# Patient Record
Sex: MALE | Race: WHITE | NOT HISPANIC OR LATINO | URBAN - METROPOLITAN AREA
[De-identification: names, ages, dates, MRNs, and addresses within clinical notes are randomized per-mention and may not be internally consistent; named-entity substitution may affect disease eponyms.]

---

## 2017-10-11 ENCOUNTER — OFFICE (OUTPATIENT)
Dept: URBAN - METROPOLITAN AREA CLINIC 75 | Facility: CLINIC | Age: 72
End: 2017-10-11

## 2017-10-11 VITALS
WEIGHT: 218 LBS | SYSTOLIC BLOOD PRESSURE: 125 MMHG | HEIGHT: 70 IN | DIASTOLIC BLOOD PRESSURE: 80 MMHG | HEART RATE: 103 BPM

## 2017-10-11 DIAGNOSIS — R94.5 ABNORMAL RESULTS OF LIVER FUNCTION STUDIES: ICD-10-CM

## 2017-10-11 DIAGNOSIS — R93.8 ABNORMAL FINDINGS ON DIAGNOSTIC IMAGING OF OTHER SPECIFIED B: ICD-10-CM

## 2017-10-11 DIAGNOSIS — K59.1 FUNCTIONAL DIARRHEA: ICD-10-CM

## 2017-10-11 DIAGNOSIS — Z86.010 PERSONAL HISTORY OF COLONIC POLYPS: ICD-10-CM

## 2017-10-11 PROCEDURE — 99215 OFFICE O/P EST HI 40 MIN: CPT | Performed by: INTERNAL MEDICINE

## 2017-10-17 ENCOUNTER — OFFICE (OUTPATIENT)
Dept: URBAN - METROPOLITAN AREA LAB 2 | Facility: LAB | Age: 72
End: 2017-10-17

## 2017-10-17 DIAGNOSIS — K59.1 FUNCTIONAL DIARRHEA: ICD-10-CM

## 2017-10-17 DIAGNOSIS — A08.11 ACUTE GASTROENTEROPATHY DUE TO NORWALK AGENT: ICD-10-CM

## 2017-10-17 PROCEDURE — 87999 UNLISTED MICROBIOLOGY PX: CPT | Performed by: INTERNAL MEDICINE

## 2017-11-10 ENCOUNTER — INPATIENT HOSPITAL (OUTPATIENT)
Dept: URBAN - METROPOLITAN AREA HOSPITAL 107 | Facility: HOSPITAL | Age: 72
End: 2017-11-10

## 2017-11-10 DIAGNOSIS — K76.0 FATTY (CHANGE OF) LIVER, NOT ELSEWHERE CLASSIFIED: ICD-10-CM

## 2017-11-10 DIAGNOSIS — R94.5 ABNORMAL RESULTS OF LIVER FUNCTION STUDIES: ICD-10-CM

## 2017-11-10 DIAGNOSIS — K83.1 OBSTRUCTION OF BILE DUCT: ICD-10-CM

## 2017-11-10 DIAGNOSIS — R93.3 ABNORMAL FINDINGS ON DIAGNOSTIC IMAGING OF OTHER PARTS OF DI: ICD-10-CM

## 2017-11-10 PROCEDURE — 43242 EGD US FINE NEEDLE BX/ASPIR: CPT

## 2017-12-28 ENCOUNTER — OFFICE (OUTPATIENT)
Dept: URBAN - METROPOLITAN AREA CLINIC 75 | Facility: CLINIC | Age: 72
End: 2017-12-28

## 2017-12-28 VITALS
HEART RATE: 96 BPM | DIASTOLIC BLOOD PRESSURE: 76 MMHG | WEIGHT: 210 LBS | SYSTOLIC BLOOD PRESSURE: 120 MMHG | HEIGHT: 70 IN

## 2017-12-28 DIAGNOSIS — R93.8 ABNORMAL FINDINGS ON DIAGNOSTIC IMAGING OF OTHER SPECIFIED B: ICD-10-CM

## 2017-12-28 DIAGNOSIS — K59.1 FUNCTIONAL DIARRHEA: ICD-10-CM

## 2017-12-28 DIAGNOSIS — R94.5 ABNORMAL RESULTS OF LIVER FUNCTION STUDIES: ICD-10-CM

## 2017-12-28 DIAGNOSIS — R68.81 EARLY SATIETY: ICD-10-CM

## 2017-12-28 DIAGNOSIS — Z86.010 PERSONAL HISTORY OF COLONIC POLYPS: ICD-10-CM

## 2017-12-28 PROCEDURE — 99214 OFFICE O/P EST MOD 30 MIN: CPT | Performed by: INTERNAL MEDICINE

## 2018-04-02 ENCOUNTER — OFFICE VISIT (OUTPATIENT)
Dept: PAIN MEDICINE | Facility: CLINIC | Age: 73
End: 2018-04-02

## 2018-04-02 VITALS
DIASTOLIC BLOOD PRESSURE: 86 MMHG | OXYGEN SATURATION: 97 % | SYSTOLIC BLOOD PRESSURE: 142 MMHG | TEMPERATURE: 97.6 F | RESPIRATION RATE: 16 BRPM | HEIGHT: 71 IN | BODY MASS INDEX: 28.56 KG/M2 | WEIGHT: 204 LBS | HEART RATE: 89 BPM

## 2018-04-02 DIAGNOSIS — G89.29 OTHER CHRONIC PAIN: Primary | ICD-10-CM

## 2018-04-02 DIAGNOSIS — M48.061 SPINAL STENOSIS OF LUMBAR REGION, UNSPECIFIED WHETHER NEUROGENIC CLAUDICATION PRESENT: ICD-10-CM

## 2018-04-02 DIAGNOSIS — Z01.818 PREOP TESTING: ICD-10-CM

## 2018-04-02 DIAGNOSIS — M54.16 LUMBAR RADICULOPATHY: ICD-10-CM

## 2018-04-02 PROCEDURE — 99203 OFFICE O/P NEW LOW 30 MIN: CPT | Performed by: ANESTHESIOLOGY

## 2018-04-02 RX ORDER — TAMSULOSIN HYDROCHLORIDE 0.4 MG/1
0.4 CAPSULE ORAL DAILY
COMMUNITY

## 2018-04-02 RX ORDER — MYCOPHENOLATE MOFETIL 250 MG/1
750 CAPSULE ORAL EVERY 12 HOURS SCHEDULED
COMMUNITY
End: 2021-06-22

## 2018-04-02 RX ORDER — VENLAFAXINE HYDROCHLORIDE 150 MG/1
CAPSULE, EXTENDED RELEASE ORAL
COMMUNITY
Start: 2018-03-05 | End: 2018-04-17

## 2018-04-02 RX ORDER — CHLORTHALIDONE 25 MG/1
25 TABLET ORAL EVERY MORNING
COMMUNITY
Start: 2017-06-21

## 2018-04-02 RX ORDER — LOSARTAN POTASSIUM 100 MG/1
100 TABLET ORAL EVERY EVENING
COMMUNITY
End: 2021-02-01

## 2018-04-02 RX ORDER — ASPIRIN 81 MG/1
81 TABLET ORAL
COMMUNITY

## 2018-04-02 RX ORDER — CYANOCOBALAMIN 1000 UG/ML
1000 INJECTION, SOLUTION INTRAMUSCULAR; SUBCUTANEOUS
COMMUNITY

## 2018-04-02 RX ORDER — BUSPIRONE HYDROCHLORIDE 10 MG/1
10 TABLET ORAL 3 TIMES DAILY
COMMUNITY
End: 2021-06-22

## 2018-04-02 RX ORDER — BACLOFEN 10 MG/1
10 TABLET ORAL 3 TIMES DAILY
COMMUNITY
Start: 2017-07-25 | End: 2023-02-20

## 2018-04-02 RX ORDER — TRAMADOL HYDROCHLORIDE 50 MG/1
50 TABLET ORAL EVERY 6 HOURS PRN
COMMUNITY
Start: 2018-02-13 | End: 2018-06-06

## 2018-04-02 RX ORDER — MELOXICAM 15 MG/1
15 TABLET ORAL
COMMUNITY
End: 2021-02-01

## 2018-04-02 RX ORDER — MEMANTINE HYDROCHLORIDE 28 MG/1
CAPSULE, EXTENDED RELEASE ORAL
COMMUNITY
Start: 2017-11-21 | End: 2018-04-17

## 2018-04-02 RX ORDER — LACTULOSE 10 G/15ML
10 SOLUTION ORAL DAILY PRN
COMMUNITY
Start: 2017-06-23 | End: 2021-02-01

## 2018-04-02 RX ORDER — OMEPRAZOLE 40 MG/1
40 CAPSULE, DELAYED RELEASE ORAL EVERY MORNING
COMMUNITY

## 2018-04-02 RX ORDER — AMLODIPINE BESYLATE 5 MG/1
10 TABLET ORAL 2 TIMES DAILY
COMMUNITY
End: 2021-06-22

## 2018-04-02 RX ORDER — TACROLIMUS 1 MG/1
1 CAPSULE ORAL 2 TIMES DAILY
COMMUNITY
End: 2022-03-23

## 2018-04-02 RX ORDER — SUB-Q INSULIN DEVICE, 40 UNIT
1 EACH MISCELLANEOUS
COMMUNITY
Start: 2013-12-30

## 2018-04-02 RX ORDER — AMOXICILLIN 250 MG
1 CAPSULE ORAL
COMMUNITY
End: 2018-04-17

## 2018-04-02 RX ORDER — MULTIVITAMIN
1 TABLET ORAL DAILY
COMMUNITY

## 2018-04-02 RX ORDER — MORPHINE SULFATE 60 MG/1
30 TABLET, FILM COATED, EXTENDED RELEASE ORAL EVERY 12 HOURS SCHEDULED
COMMUNITY
Start: 2017-08-25 | End: 2021-10-27

## 2018-04-02 RX ORDER — SPIRONOLACTONE 25 MG/1
25 TABLET ORAL EVERY MORNING
COMMUNITY
End: 2021-06-22

## 2018-04-02 RX ORDER — RIVASTIGMINE TARTRATE 6 MG/1
CAPSULE ORAL
COMMUNITY
Start: 2017-12-11 | End: 2018-04-17

## 2018-04-02 RX ORDER — OXYCODONE HYDROCHLORIDE AND ACETAMINOPHEN 5; 325 MG/1; MG/1
1 TABLET ORAL EVERY 6 HOURS PRN
COMMUNITY
End: 2018-06-06

## 2018-04-02 RX ORDER — QUETIAPINE FUMARATE 25 MG/1
25 TABLET, FILM COATED ORAL NIGHTLY
COMMUNITY
End: 2018-04-17

## 2018-04-02 NOTE — PROGRESS NOTES
"CHIEF COMPLAINT  Pt is here for a SCS consult for LBP (no back surgery,no known accident))referred here per Dr Zepeda.  Pt has had significant LBP, becoming \"almost debilitating\" over the last 2 years. Pt has had numerous Lumbar injections,2 Lumbar RFs,with minimal relief.  Hx of PT: minimal relief.  Pt also has daily bilateral posterior upper leg pain (no numbness or tingling) for about 2 years.    Tien Cotto Sr. is a 73 y.o. male.   He presents to the office for evaluation of SCS Phase TWO. He was referred here by Dr. Zepeda    He recently completed a quite successful SCS trial, stating that he had much more than 50% pain relief during the trial and he is excited to proceed to implant.      His medical history as below is complex but he states that he heals well and he does not take any blood thinners.  Continues to follow with the [facility]plant team regularly (annually).      LBP is across the lower lumbar area bilaterally as a stabbing pain that radiates down to knees posteriorly bilaterally.  He notes that he had 50-70% relief at all times during the SCS trial.  Pain is constant and severe and affects mood and sleep.      History of Present Illness         Current Outpatient Prescriptions:   •  amLODIPine (NORVASC) 5 MG tablet, Take 5 mg by mouth., Disp: , Rfl:   •  aspirin 81 MG EC tablet, Take 81 mg by mouth., Disp: , Rfl:   •  baclofen (LIORESAL) 10 MG tablet, 10 mg., Disp: , Rfl:   •  busPIRone (BUSPAR) 10 MG tablet, Take 10 mg by mouth 3 (Three) Times a Day., Disp: , Rfl:   •  chlorthalidone (HYGROTON) 25 MG tablet, Take 25 mg by mouth., Disp: , Rfl:   •  cyanocobalamin 1000 MCG/ML injection, Inject 1,000 mcg into the shoulder, thigh, or buttocks., Disp: , Rfl:   •  insulin aspart (NOVOLOG) 100 UNIT/ML injection, , Disp: , Rfl:   •  Insulin Disposable Pump (V-GO 30) kit, Inject 1 Device under the skin., Disp: , Rfl:   •  insulin lispro (HUMALOG) 100 UNIT/ML " injection, INJECT 60  UNITS SUB-Q DAILY, Disp: , Rfl:   •  lactulose (CHRONULAC) 10 GM/15ML solution, Take 10 g by mouth., Disp: , Rfl:   •  losartan (COZAAR) 100 MG tablet, Take 100 mg by mouth., Disp: , Rfl:   •  MAGNESIUM OXIDE PO, Take 250 mg by mouth., Disp: , Rfl:   •  meloxicam (MOBIC) 15 MG tablet, Take 15 mg by mouth., Disp: , Rfl:   •  memantine (NAMENDA XR) 28 MG capsule sustained-release 24 hr extended release capsule, TAKE ONE CAPSULE BY MOUTH DAILY, Disp: , Rfl:   •  Morphine (MS CONTIN) 60 MG 12 hr tablet, Take 30 mg by mouth., Disp: , Rfl:   •  Multiple Vitamin (MULTIVITAMIN) tablet, Take 1 tablet by mouth., Disp: , Rfl:   •  mycophenolate (CELLCEPT) 250 MG capsule, Take 250 mg by mouth., Disp: , Rfl:   •  Naloxegol Oxalate (MOVANTIK) 25 MG tablet, Take 25 mg by mouth., Disp: , Rfl:   •  omeprazole (priLOSEC) 40 MG capsule, Take 40 mg by mouth., Disp: , Rfl:   •  oxyCODONE-acetaminophen (PERCOCET) 5-325 MG per tablet, Take 1 tablet by mouth., Disp: , Rfl:   •  QUEtiapine (SEROquel) 25 MG tablet, Take 25 mg by mouth Every Night., Disp: , Rfl:   •  rivastigmine (EXELON) 6 MG capsule, TAKE ONE CAPSULE BY MOUTH TWICE A DAY, Disp: , Rfl:   •  senna-docusate (PERICOLACE) 8.6-50 MG per tablet, Take 1 tablet by mouth., Disp: , Rfl:   •  spironolactone (ALDACTONE) 25 MG tablet, Take 25 mg by mouth., Disp: , Rfl:   •  tacrolimus (PROGRAF) 1 MG capsule, Take 1 mg by mouth., Disp: , Rfl:   •  tamsulosin (FLOMAX) 0.4 MG capsule 24 hr capsule, Take 0.4 mg by mouth., Disp: , Rfl:   •  traMADol (ULTRAM) 50 MG tablet, , Disp: , Rfl:   •  venlafaxine XR (EFFEXOR-XR) 150 MG 24 hr capsule, TAKE ONE CAPSULE BY MOUTH EVERY MORNING, Disp: , Rfl:     The following portions of the patient's history were reviewed and updated as appropriate: allergies, current medications, past family history, past medical history, past social history, past surgical history and problem list.     -------    The following portions of the  patient's history were reviewed and updated as appropriate: allergies, current medications, past family history, past medical history, past social history, past surgical history and problem list.    Allergies   Allergen Reactions   • Coreg [Carvedilol] Hives   • Singulair [Montelukast Sodium] Other (See Comments)     weakness   • Theophyllines Other (See Comments)     weakness         Current Outpatient Prescriptions:   •  amLODIPine (NORVASC) 5 MG tablet, Take 5 mg by mouth., Disp: , Rfl:   •  aspirin 81 MG EC tablet, Take 81 mg by mouth., Disp: , Rfl:   •  baclofen (LIORESAL) 10 MG tablet, 10 mg., Disp: , Rfl:   •  busPIRone (BUSPAR) 10 MG tablet, Take 10 mg by mouth 3 (Three) Times a Day., Disp: , Rfl:   •  chlorthalidone (HYGROTON) 25 MG tablet, Take 25 mg by mouth., Disp: , Rfl:   •  cyanocobalamin 1000 MCG/ML injection, Inject 1,000 mcg into the shoulder, thigh, or buttocks., Disp: , Rfl:   •  insulin aspart (NOVOLOG) 100 UNIT/ML injection, , Disp: , Rfl:   •  Insulin Disposable Pump (V-GO 30) kit, Inject 1 Device under the skin., Disp: , Rfl:   •  insulin lispro (HUMALOG) 100 UNIT/ML injection, INJECT 60  UNITS SUB-Q DAILY, Disp: , Rfl:   •  lactulose (CHRONULAC) 10 GM/15ML solution, Take 10 g by mouth., Disp: , Rfl:   •  losartan (COZAAR) 100 MG tablet, Take 100 mg by mouth., Disp: , Rfl:   •  MAGNESIUM OXIDE PO, Take 250 mg by mouth., Disp: , Rfl:   •  meloxicam (MOBIC) 15 MG tablet, Take 15 mg by mouth., Disp: , Rfl:   •  memantine (NAMENDA XR) 28 MG capsule sustained-release 24 hr extended release capsule, TAKE ONE CAPSULE BY MOUTH DAILY, Disp: , Rfl:   •  Morphine (MS CONTIN) 60 MG 12 hr tablet, Take 30 mg by mouth., Disp: , Rfl:   •  Multiple Vitamin (MULTIVITAMIN) tablet, Take 1 tablet by mouth., Disp: , Rfl:   •  mycophenolate (CELLCEPT) 250 MG capsule, Take 250 mg by mouth., Disp: , Rfl:   •  Naloxegol Oxalate (MOVANTIK) 25 MG tablet, Take 25 mg by mouth., Disp: , Rfl:   •  omeprazole  (priLOSEC) 40 MG capsule, Take 40 mg by mouth., Disp: , Rfl:   •  oxyCODONE-acetaminophen (PERCOCET) 5-325 MG per tablet, Take 1 tablet by mouth., Disp: , Rfl:   •  QUEtiapine (SEROquel) 25 MG tablet, Take 25 mg by mouth Every Night., Disp: , Rfl:   •  rivastigmine (EXELON) 6 MG capsule, TAKE ONE CAPSULE BY MOUTH TWICE A DAY, Disp: , Rfl:   •  senna-docusate (PERICOLACE) 8.6-50 MG per tablet, Take 1 tablet by mouth., Disp: , Rfl:   •  spironolactone (ALDACTONE) 25 MG tablet, Take 25 mg by mouth., Disp: , Rfl:   •  tacrolimus (PROGRAF) 1 MG capsule, Take 1 mg by mouth., Disp: , Rfl:   •  tamsulosin (FLOMAX) 0.4 MG capsule 24 hr capsule, Take 0.4 mg by mouth., Disp: , Rfl:   •  traMADol (ULTRAM) 50 MG tablet, , Disp: , Rfl:   •  venlafaxine XR (EFFEXOR-XR) 150 MG 24 hr capsule, TAKE ONE CAPSULE BY MOUTH EVERY MORNING, Disp: , Rfl:     No current outpatient prescriptions on file prior to visit.     No current facility-administered medications on file prior to visit.        There is no problem list on file for this patient.      Past Medical History:   Diagnosis Date   • Depression    • Joint pain    • Low back pain    • Myocardial infarct    • Neuropathy in diabetes    • Osteoarthritis    • Peripheral neuropathy        Past Surgical History:   Procedure Laterality Date   • HEART TRANSPLANT     • KNEE SURGERY Bilateral        History reviewed. No pertinent family history.    Social History     Social History   • Marital status:      Spouse name: N/A   • Number of children: N/A   • Years of education: N/A     Occupational History   • Not on file.     Social History Main Topics   • Smoking status: Former Smoker   • Smokeless tobacco: Never Used   • Alcohol use No   • Drug use: No   • Sexual activity: Not on file     Other Topics Concern   • Not on file     Social History Narrative   • No narrative on file       -------        REVIEW OF PERTINENT MEDICAL DATA    Vale = 16.    Reviewed referral packet from   "Katelyn, 11 pages.  Reviewed op note.  L1-2 entries up to mid T7.  Stim focus was mid T8.      Review of Systems   Constitutional: Positive for activity change (decreased).   HENT: Positive for hearing loss and trouble swallowing.    Respiratory: Positive for apnea (cpap). Negative for chest tightness and shortness of breath.    Cardiovascular: Negative for chest pain.   Gastrointestinal: Positive for constipation. Negative for diarrhea and nausea.   Genitourinary: Positive for difficulty urinating. Negative for dysuria.   Musculoskeletal: Positive for back pain and gait problem.   Neurological: Negative for dizziness, seizures, light-headedness and numbness.   Psychiatric/Behavioral: Positive for sleep disturbance. Negative for suicidal ideas.         Vitals:    04/02/18 1510   BP: 142/86   Pulse: 89   Resp: 16   Temp: 97.6 °F (36.4 °C)   SpO2: 97%   Weight: 92.5 kg (204 lb)   Height: 180 cm (70.87\")   PainSc: 6  Comment: LBP bilaterally ranges from 3-8/10   PainLoc: Back         Objective   Physical Exam   Constitutional: He is oriented to person, place, and time. Vital signs are normal. He appears well-developed and well-nourished.  Non-toxic appearance. No distress.   HENT:   Head: Normocephalic and atraumatic.   Right Ear: Hearing and external ear normal.   Left Ear: Hearing and external ear normal.   Nose: Nose normal.   Eyes: Conjunctivae and lids are normal. Pupils are equal, round, and reactive to light.   Pulmonary/Chest: Effort normal. No respiratory distress.   Abdominal: Normal appearance.   Neurological: He is alert and oriented to person, place, and time. No cranial nerve deficit.   Psychiatric: He has a normal mood and affect. His behavior is normal.   Nursing note and vitals reviewed.      Assessment/Plan   Saurav was seen today for back pain.    Diagnoses and all orders for this visit:    Other chronic pain    Lumbar radiculopathy  -     Case Request    Spinal stenosis of lumbar region, " unspecified whether neurogenic claudication present  -     Case Request    Preop testing  -     CBC & Differential; Future  -     Comprehensive Metabolic Panel; Future  -     XR Chest 2 View; Future  -     MRSA Screen Culture - Swab, Nares; Future  -     ECG 12 Lead; Future        -- Follow-up for implant... Seeking auth for SCS Phase 2   -- plan included IMPLANTATION AT THE HOSPITAL which would be preferable from an anesthetic perspective.  Plan for General Anesthesia.    -- I feel that this patient is an appropriate candidate for SCS Phase Two implantation.  We plan to use the [employer] system.    -- This patient does not have any evidence of sepsis nor coagulopathy.  -- Patient is not a clear candidate for neurosurgical intervention, and has no other reasonable options for basic interventions, injection therapy, and physical therapy.  -- We plan to use the SCS trial findings to reproduce that same placement location as we proceed with implantation SCS Phase 2.    -- Goals are to improve functionality and activity as well as decrease and/or eliminate the need for oral medication management.    ----------  Education about Spinal Cord Stimulation Phase 2... Implant of the SCS therapy:      -  This was an extended office visit in which we entered into discussion about advanced pain relieving techniques, and discussed implantable pain therapies.  We discussed advanced neuromodulation in the form of Spinal Cord Stimulation.  This is a reasonable therapy for patients who have exhausted basic nonnarcotic options, basic modalities and physical therapies, and do not have any other reasonable surgical options.  This therapy as an alternative to long term high dose opioid therapy.      -  Risks include but are not limited to bleeding, infection, injury, paralysis, nerve injury, dural puncture, and risk for postprocedural pain.  Implanted equipment risks include but are not limited to lead migration, lead fracture,  risk of loss of pain relieving stimulation, risk of electrical shock, and risk of system failure.      - We discussed the theory and basic science behind SCS therapy including but not limited to energy delivery and relevant anatomy, in terms that are easy to understand and also with use of illustrative devices.  Spinal Cord Stimulation therapies apply an electromagnetic field to a specific area on the spinal cord (Dorsal Column) to attempt to block transmission of painful signals from the peripheral nerves to the brain.      -  We reviewed the education this patient received, and the fact that the patient had a favorable presurgical psychological evaluation.       - We discussed the successful trialing process (aka Phase 1) that this patient experienced.  We discussed the noted improvements in pain control &/or functional status during the trial.   Trial success of at least 50% relief determines whether or not we proceed to implant.  We discussed reasonable expectations, and that I feel that consistent 50% pain relief during the trial deems it to have been medically successful, and is a reasonable expectation to justify moving forward to permanent implant.      - We discussed the percutaneous surgical implant, including postsurgical restrictions, risks, and alternatives.   For spinal cord stimulation implanted device (aka SCS Phase 2) there is usually a midline vertical incision for the spinally implanted leads, and also a horizontal incision in the posterior lumbar flank for implantation of the battery & computer (aka IPG).  The leads are tunneled from the midline incision to the medial aspect of the battery pocket incision.      - We plan to place the permanent electrodes in the same spinal region where the temporary trial produced relief.      -  Postoperative restrictions include limiting the following activity as much as possible for 90 days:  Lifting >10 lbs, bending at the waist, stretching/reaching overhead,  and twisting.  During this time the patient is asked to not drive with the device turned on.    ----------    Education was 20 minutes of this 35 minute office visit.           EMR Dragon/Transcription disclaimer:   Much of this encounter note is an electronic transcription/translation of spoken language to printed text. The electronic translation of spoken language may permit erroneous, or at times, nonsensical words or phrases to be inadvertently transcribed; Although I have reviewed the note for such errors, some may still exist.

## 2018-04-02 NOTE — PATIENT INSTRUCTIONS
-- Follow-up for implant... Seeking auth for SCS Phase 2   -- I feel that this patient is an appropriate candidate for SCS Phase Two implantation.  We plan to use the [employer] system.    -- This patient does not have any evidence of sepsis nor coagulopathy.  -- Patient is not a clear candidate for neurosurgical intervention, and has no other reasonable options for basic interventions, injection therapy, and physical therapy.  -- We plan to use the SCS trial findings to reproduce that same placement location as we proceed with implantation SCS Phase 2.    -- Goals are to improve functionality and activity as well as decrease and/or eliminate the need for oral medication management.    ----------  Education about Spinal Cord Stimulation Phase 2... Implant of the SCS therapy:      -  This was an extended office visit in which we entered into discussion about advanced pain relieving techniques, and discussed implantable pain therapies.  We discussed advanced neuromodulation in the form of Spinal Cord Stimulation.  This is a reasonable therapy for patients who have exhausted basic nonnarcotic options, basic modalities and physical therapies, and do not have any other reasonable surgical options.  This therapy as an alternative to long term high dose opioid therapy.      -  Risks include but are not limited to bleeding, infection, injury, paralysis, nerve injury, dural puncture, and risk for postprocedural pain.  Implanted equipment risks include but are not limited to lead migration, lead fracture, risk of loss of pain relieving stimulation, risk of electrical shock, and risk of system failure.      - We discussed the theory and basic science behind SCS therapy including but not limited to energy delivery and relevant anatomy, in terms that are easy to understand and also with use of illustrative devices.  Spinal Cord Stimulation therapies apply an electromagnetic field to a specific area on the spinal cord  (Dorsal Column) to attempt to block transmission of painful signals from the peripheral nerves to the brain.      -  We reviewed the education this patient received, and the fact that the patient had a favorable presurgical psychological evaluation.       - We discussed the successful trialing process (aka Phase 1) that this patient experienced.  We discussed the noted improvements in pain control &/or functional status during the trial.   Trial success of at least 50% relief determines whether or not we proceed to implant.  We discussed reasonable expectations, and that I feel that consistent 50% pain relief during the trial deems it to have been medically successful, and is a reasonable expectation to justify moving forward to permanent implant.      - We discussed the percutaneous surgical implant, including postsurgical restrictions, risks, and alternatives.   For spinal cord stimulation implanted device (aka SCS Phase 2) there is usually a midline vertical incision for the spinally implanted leads, and also a horizontal incision in the posterior lumbar flank for implantation of the battery & computer (aka IPG).  The leads are tunneled from the midline incision to the medial aspect of the battery pocket incision.      - We plan to place the permanent electrodes in the same spinal region where the temporary trial produced relief.      -  Postoperative restrictions include limiting the following activity as much as possible for 90 days:  Lifting >10 lbs, bending at the waist, stretching/reaching overhead, and twisting.  During this time the patient is asked to not drive with the device turned on.    ----------

## 2018-04-07 ENCOUNTER — RESULTS ENCOUNTER (OUTPATIENT)
Dept: PAIN MEDICINE | Facility: CLINIC | Age: 73
End: 2018-04-07

## 2018-04-07 DIAGNOSIS — Z01.818 PREOP TESTING: ICD-10-CM

## 2018-04-11 DIAGNOSIS — Z01.818 PRE-OP TESTING: Primary | ICD-10-CM

## 2018-04-13 PROBLEM — M54.16 LUMBAR RADICULOPATHY: Status: ACTIVE | Noted: 2018-04-13

## 2018-04-13 PROBLEM — M48.061 SPINAL STENOSIS OF LUMBAR REGION: Status: ACTIVE | Noted: 2018-04-13

## 2018-04-17 ENCOUNTER — APPOINTMENT (OUTPATIENT)
Dept: PREADMISSION TESTING | Facility: HOSPITAL | Age: 73
End: 2018-04-17

## 2018-04-17 ENCOUNTER — HOSPITAL ENCOUNTER (OUTPATIENT)
Dept: GENERAL RADIOLOGY | Facility: HOSPITAL | Age: 73
Discharge: HOME | End: 2018-04-17
Admitting: ANESTHESIOLOGY

## 2018-04-17 VITALS
WEIGHT: 197.13 LBS | SYSTOLIC BLOOD PRESSURE: 115 MMHG | HEIGHT: 60 IN | DIASTOLIC BLOOD PRESSURE: 76 MMHG | TEMPERATURE: 98 F | RESPIRATION RATE: 16 BRPM | HEART RATE: 75 BPM | OXYGEN SATURATION: 97 % | BODY MASS INDEX: 38.7 KG/M2

## 2018-04-17 DIAGNOSIS — Z01.818 PREOP TESTING: ICD-10-CM

## 2018-04-17 LAB
ALBUMIN SERPL-MCNC: 3.8 G/DL (ref 3.5–5.2)
ALBUMIN/GLOB SERPL: 1.5 G/DL
ALP SERPL-CCNC: 128 U/L (ref 39–117)
ALT SERPL W P-5'-P-CCNC: 14 U/L (ref 1–41)
ANION GAP SERPL CALCULATED.3IONS-SCNC: 12.4 MMOL/L
AST SERPL-CCNC: 15 U/L (ref 1–40)
BASOPHILS # BLD AUTO: 0.01 10*3/MM3 (ref 0–0.2)
BASOPHILS NFR BLD AUTO: 0.2 % (ref 0–1.5)
BILIRUB SERPL-MCNC: 0.4 MG/DL (ref 0.1–1.2)
BUN BLD-MCNC: 26 MG/DL (ref 8–23)
BUN/CREAT SERPL: 22.4 (ref 7–25)
CALCIUM SPEC-SCNC: 10.1 MG/DL (ref 8.6–10.5)
CHLORIDE SERPL-SCNC: 98 MMOL/L (ref 98–107)
CO2 SERPL-SCNC: 26.6 MMOL/L (ref 22–29)
CREAT BLD-MCNC: 1.16 MG/DL (ref 0.76–1.27)
DEPRECATED RDW RBC AUTO: 47.6 FL (ref 37–54)
EOSINOPHIL # BLD AUTO: 0.09 10*3/MM3 (ref 0–0.7)
EOSINOPHIL NFR BLD AUTO: 1.6 % (ref 0.3–6.2)
ERYTHROCYTE [DISTWIDTH] IN BLOOD BY AUTOMATED COUNT: 12.7 % (ref 11.5–14.5)
GFR SERPL CREATININE-BSD FRML MDRD: 62 ML/MIN/1.73
GLOBULIN UR ELPH-MCNC: 2.6 GM/DL
GLUCOSE BLD-MCNC: 149 MG/DL (ref 65–99)
HCT VFR BLD AUTO: 41.1 % (ref 40.4–52.2)
HGB BLD-MCNC: 13 G/DL (ref 13.7–17.6)
IMM GRANULOCYTES # BLD: 0.03 10*3/MM3 (ref 0–0.03)
IMM GRANULOCYTES NFR BLD: 0.5 % (ref 0–0.5)
LYMPHOCYTES # BLD AUTO: 0.72 10*3/MM3 (ref 0.9–4.8)
LYMPHOCYTES NFR BLD AUTO: 13 % (ref 19.6–45.3)
MCH RBC QN AUTO: 32.4 PG (ref 27–32.7)
MCHC RBC AUTO-ENTMCNC: 31.6 G/DL (ref 32.6–36.4)
MCV RBC AUTO: 102.5 FL (ref 79.8–96.2)
MONOCYTES # BLD AUTO: 0.83 10*3/MM3 (ref 0.2–1.2)
MONOCYTES NFR BLD AUTO: 15 % (ref 5–12)
NEUTROPHILS # BLD AUTO: 3.84 10*3/MM3 (ref 1.9–8.1)
NEUTROPHILS NFR BLD AUTO: 69.7 % (ref 42.7–76)
PLATELET # BLD AUTO: 240 10*3/MM3 (ref 140–500)
PMV BLD AUTO: 9.7 FL (ref 6–12)
POTASSIUM BLD-SCNC: 4.7 MMOL/L (ref 3.5–5.2)
PROT SERPL-MCNC: 6.4 G/DL (ref 6–8.5)
RBC # BLD AUTO: 4.01 10*6/MM3 (ref 4.6–6)
SODIUM BLD-SCNC: 137 MMOL/L (ref 136–145)
WBC NRBC COR # BLD: 5.52 10*3/MM3 (ref 4.5–10.7)

## 2018-04-17 PROCEDURE — 36415 COLL VENOUS BLD VENIPUNCTURE: CPT

## 2018-04-17 PROCEDURE — 71046 X-RAY EXAM CHEST 2 VIEWS: CPT

## 2018-04-17 PROCEDURE — 87081 CULTURE SCREEN ONLY: CPT | Performed by: ANESTHESIOLOGY

## 2018-04-17 PROCEDURE — 85025 COMPLETE CBC W/AUTO DIFF WBC: CPT | Performed by: ANESTHESIOLOGY

## 2018-04-17 PROCEDURE — 80053 COMPREHEN METABOLIC PANEL: CPT | Performed by: ANESTHESIOLOGY

## 2018-04-17 RX ORDER — VENLAFAXINE 75 MG/1
225 TABLET ORAL DAILY
COMMUNITY
End: 2021-02-01

## 2018-04-17 RX ORDER — CALCIUM CARBONATE/VITAMIN D3 600 MG-10
1 TABLET ORAL DAILY
COMMUNITY
End: 2021-02-01

## 2018-04-17 RX ORDER — RIVASTIGMINE TARTRATE 6 MG/1
6 CAPSULE ORAL 2 TIMES DAILY
COMMUNITY
End: 2021-06-22

## 2018-04-17 RX ORDER — CHOLECALCIFEROL (VITAMIN D3) 125 MCG
1 CAPSULE ORAL EVERY EVENING
COMMUNITY

## 2018-04-17 RX ORDER — MEMANTINE HYDROCHLORIDE 28 MG/1
28 CAPSULE, EXTENDED RELEASE ORAL DAILY
COMMUNITY
End: 2021-06-22

## 2018-04-17 RX ORDER — ALBUTEROL SULFATE 90 UG/1
2 AEROSOL, METERED RESPIRATORY (INHALATION) EVERY 4 HOURS PRN
COMMUNITY

## 2018-04-17 NOTE — DISCHARGE INSTRUCTIONS
Take the following medications the morning of surgery with a small sip of water:    TACROLIMUS (PROGRAF)    MYCOPHENOLATE (CELLCEPT)  AMLODIPINE  OMEPRAZOLE  MORPHINE   ALBUTEROL INHALER    General Instructions:  • Do not eat solid food after midnight the night before surgery.  • You may drink clear liquids day of surgery but must stop at least one hour before your hospital arrival time STOP DRINKING @ 0430 AM.  • It is beneficial for you to have a clear drink that contains carbohydrates the day of surgery.  We suggest  a 12 to 20 ounce bottle of G2 or Powerade Zero for diabetic patients.    Clear liquids are liquids you can see through.  Nothing red in color.     Plain water                               Sports drinks  Sodas                                   Gelatin (Jell-O)  Fruit juices without pulp such as white grape juice and apple juice  Popsicles that contain no fruit or yogurt  Tea or coffee (no cream or milk added)  G2 / Powerade Zero    • Patients who avoid smoking, chewing tobacco and alcohol for 4 weeks prior to surgery have a reduced risk of post-operative complications.  Quit smoking as many days before surgery as you can.  • Do not smoke, use chewing tobacco or drink alcohol the day of surgery.   • Bring your C-PAP machine.  • Bring any papers given to you in the doctor’s office.  • Wear clean comfortable clothes and socks.  • Do not wear contact lenses or make-up.  Bring a case for your glasses.   • Bring crutches or walker if applicable.  • Remove all piercings.  Leave jewelry and any other valuables at home.  • Hair extensions with metal clips must be removed prior to surgery.  • The Pre-Admission Testing nurse will instruct you to bring medications if unable to obtain an accurate list in Pre-Admission Testing.          Preventing a Surgical Site Infection:  • For 2 to 3 days before surgery, avoid shaving with a razor because the razor can irritate skin and make it easier to develop an  infection.  • The night prior to surgery sleep in a clean bed with clean clothing.  Do not allow pets to sleep with you.  • Shower on the morning of surgery using a fresh bar of anti-bacterial soap (such as Dial) and clean washcloth.  Dry with a clean towel and dress in clean clothing.  • Ask your surgeon if you will be receiving antibiotics prior to surgery.  • Make sure you, your family, and all healthcare providers clean their hands with soap and water or an alcohol based hand  before caring for you or your wound.    Day of surgery: 04- ARRIVE @ 0530 AM REPORT TO THE MAIN OR   Upon arrival, a Pre-op nurse and Anesthesiologist will review your health history, obtain vital signs, and answer questions you may have.  The only belongings needed at this time will be your home medications and your C-PAP machine.  If you are staying overnight your family can leave the rest of your belongings in the car and bring them to your room later.  A Pre-op nurse will start an IV and you may receive medication in preparation for surgery, including something to help you relax.  Your family will be able to see you in the Pre-op area.  While you are in surgery your family should notify the waiting room  if they leave the waiting room area and provide a contact phone number.    Please be aware that surgery does come with discomfort.  We want to make every effort to control your discomfort so please discuss any uncontrolled symptoms with your nurse.   Your doctor will most likely have prescribed pain medications.      If you are going home after surgery you will receive individualized written care instructions before being discharged.  A responsible adult must drive you to and from the hospital on the day of your surgery and stay with you for 24 hours.    If you are staying overnight following surgery, you will be transported to your hospital room following the recovery period.  [facility] has  all private rooms.    If you have any questions please call Pre-Admission Testing at [phone].  Deductibles and co-payments are collected on the day of service. Please be prepared to pay the required co-pay, deductible or deposit on the day of service as defined by your plan.

## 2018-04-19 LAB — MRSA SPEC QL CULT: NORMAL

## 2018-04-19 RX ORDER — SODIUM CHLORIDE 0.9 % (FLUSH) 0.9 %
1-10 SYRINGE (ML) INJECTION AS NEEDED
Status: DISCONTINUED | OUTPATIENT
Start: 2018-04-19 | End: 2018-04-20 | Stop reason: HOSPADM

## 2018-04-20 ENCOUNTER — ANESTHESIA EVENT (OUTPATIENT)
Dept: PERIOP | Facility: HOSPITAL | Age: 73
End: 2018-04-20

## 2018-04-20 ENCOUNTER — APPOINTMENT (OUTPATIENT)
Dept: GENERAL RADIOLOGY | Facility: HOSPITAL | Age: 73
End: 2018-04-20

## 2018-04-20 ENCOUNTER — HOSPITAL ENCOUNTER (OUTPATIENT)
Facility: HOSPITAL | Age: 73
Setting detail: HOSPITAL OUTPATIENT SURGERY
Discharge: HOME | End: 2018-04-20
Attending: ANESTHESIOLOGY | Admitting: ANESTHESIOLOGY

## 2018-04-20 ENCOUNTER — ANESTHESIA (OUTPATIENT)
Dept: PERIOP | Facility: HOSPITAL | Age: 73
End: 2018-04-20

## 2018-04-20 VITALS
BODY MASS INDEX: 28.91 KG/M2 | HEART RATE: 85 BPM | TEMPERATURE: 97.8 F | SYSTOLIC BLOOD PRESSURE: 125 MMHG | WEIGHT: 201.94 LBS | OXYGEN SATURATION: 95 % | RESPIRATION RATE: 16 BRPM | DIASTOLIC BLOOD PRESSURE: 63 MMHG | HEIGHT: 70 IN

## 2018-04-20 LAB
GLUCOSE BLDC GLUCOMTR-MCNC: 132 MG/DL (ref 70–130)
GLUCOSE BLDC GLUCOMTR-MCNC: 91 MG/DL (ref 70–130)

## 2018-04-20 PROCEDURE — 25010000002 PROPOFOL 10 MG/ML EMULSION: Performed by: NURSE ANESTHETIST, CERTIFIED REGISTERED

## 2018-04-20 PROCEDURE — 25010000002 MIDAZOLAM PER 1 MG: Performed by: ANESTHESIOLOGY

## 2018-04-20 PROCEDURE — 25010000002 HYDROMORPHONE PER 4 MG: Performed by: NURSE ANESTHETIST, CERTIFIED REGISTERED

## 2018-04-20 PROCEDURE — 63685 INS/RPLC SPI NPG/RCVR POCKET: CPT | Performed by: ANESTHESIOLOGY

## 2018-04-20 PROCEDURE — C1787 PATIENT PROGR, NEUROSTIM: HCPCS | Performed by: ANESTHESIOLOGY

## 2018-04-20 PROCEDURE — 76000 FLUOROSCOPY <1 HR PHYS/QHP: CPT

## 2018-04-20 PROCEDURE — L8689 EXTERNAL RECHARG SYS INTERN: HCPCS | Performed by: ANESTHESIOLOGY

## 2018-04-20 PROCEDURE — 72080 X-RAY EXAM THORACOLMB 2/> VW: CPT

## 2018-04-20 PROCEDURE — 25010000002 FENTANYL CITRATE (PF) 100 MCG/2ML SOLUTION: Performed by: ANESTHESIOLOGY

## 2018-04-20 PROCEDURE — C1820 GENERATOR NEURO RECHG BAT SY: HCPCS | Performed by: ANESTHESIOLOGY

## 2018-04-20 PROCEDURE — 25010000002 VANCOMYCIN 10 G RECONSTITUTED SOLUTION: Performed by: ANESTHESIOLOGY

## 2018-04-20 PROCEDURE — C1778 LEAD, NEUROSTIMULATOR: HCPCS | Performed by: ANESTHESIOLOGY

## 2018-04-20 PROCEDURE — 25010000002 ONDANSETRON PER 1 MG: Performed by: NURSE ANESTHETIST, CERTIFIED REGISTERED

## 2018-04-20 PROCEDURE — 82962 GLUCOSE BLOOD TEST: CPT

## 2018-04-20 PROCEDURE — 63650 IMPLANT NEUROELECTRODES: CPT | Performed by: ANESTHESIOLOGY

## 2018-04-20 DEVICE — IMPLANTABLE PULSE GENERATOR KIT
Type: IMPLANTABLE DEVICE | Site: BACK | Status: FUNCTIONAL
Brand: PRECISION™ MONTAGE™ MRI

## 2018-04-20 DEVICE — 50CM 8 CONTACT LEAD KIT
Type: IMPLANTABLE DEVICE | Site: BACK | Status: FUNCTIONAL
Brand: LINEAR™ 3-4

## 2018-04-20 RX ORDER — EPHEDRINE SULFATE 50 MG/ML
5 INJECTION, SOLUTION INTRAVENOUS ONCE AS NEEDED
Status: DISCONTINUED | OUTPATIENT
Start: 2018-04-20 | End: 2018-04-20 | Stop reason: HOSPADM

## 2018-04-20 RX ORDER — ONDANSETRON 2 MG/ML
INJECTION INTRAMUSCULAR; INTRAVENOUS AS NEEDED
Status: DISCONTINUED | OUTPATIENT
Start: 2018-04-20 | End: 2018-04-20 | Stop reason: SURG

## 2018-04-20 RX ORDER — PROMETHAZINE HYDROCHLORIDE 25 MG/1
25 TABLET ORAL ONCE AS NEEDED
Status: DISCONTINUED | OUTPATIENT
Start: 2018-04-20 | End: 2018-04-20 | Stop reason: HOSPADM

## 2018-04-20 RX ORDER — DIPHENHYDRAMINE HYDROCHLORIDE 50 MG/ML
12.5 INJECTION INTRAMUSCULAR; INTRAVENOUS
Status: DISCONTINUED | OUTPATIENT
Start: 2018-04-20 | End: 2018-04-20 | Stop reason: HOSPADM

## 2018-04-20 RX ORDER — PROMETHAZINE HYDROCHLORIDE 25 MG/1
25 SUPPOSITORY RECTAL ONCE AS NEEDED
Status: DISCONTINUED | OUTPATIENT
Start: 2018-04-20 | End: 2018-04-20 | Stop reason: HOSPADM

## 2018-04-20 RX ORDER — OXYCODONE AND ACETAMINOPHEN 7.5; 325 MG/1; MG/1
1 TABLET ORAL ONCE AS NEEDED
Status: DISCONTINUED | OUTPATIENT
Start: 2018-04-20 | End: 2018-04-20 | Stop reason: HOSPADM

## 2018-04-20 RX ORDER — SODIUM CHLORIDE, SODIUM LACTATE, POTASSIUM CHLORIDE, CALCIUM CHLORIDE 600; 310; 30; 20 MG/100ML; MG/100ML; MG/100ML; MG/100ML
9 INJECTION, SOLUTION INTRAVENOUS CONTINUOUS
Status: DISCONTINUED | OUTPATIENT
Start: 2018-04-20 | End: 2018-04-20 | Stop reason: HOSPADM

## 2018-04-20 RX ORDER — LIDOCAINE HYDROCHLORIDE 20 MG/ML
INJECTION, SOLUTION INFILTRATION; PERINEURAL AS NEEDED
Status: DISCONTINUED | OUTPATIENT
Start: 2018-04-20 | End: 2018-04-20 | Stop reason: SURG

## 2018-04-20 RX ORDER — FAMOTIDINE 10 MG/ML
20 INJECTION, SOLUTION INTRAVENOUS ONCE
Status: COMPLETED | OUTPATIENT
Start: 2018-04-20 | End: 2018-04-20

## 2018-04-20 RX ORDER — PROMETHAZINE HYDROCHLORIDE 25 MG/ML
12.5 INJECTION, SOLUTION INTRAMUSCULAR; INTRAVENOUS ONCE AS NEEDED
Status: DISCONTINUED | OUTPATIENT
Start: 2018-04-20 | End: 2018-04-20 | Stop reason: HOSPADM

## 2018-04-20 RX ORDER — SODIUM CHLORIDE 0.9 % (FLUSH) 0.9 %
1-10 SYRINGE (ML) INJECTION AS NEEDED
Status: DISCONTINUED | OUTPATIENT
Start: 2018-04-20 | End: 2018-04-20 | Stop reason: HOSPADM

## 2018-04-20 RX ORDER — FLUMAZENIL 0.1 MG/ML
0.2 INJECTION INTRAVENOUS AS NEEDED
Status: DISCONTINUED | OUTPATIENT
Start: 2018-04-20 | End: 2018-04-20 | Stop reason: HOSPADM

## 2018-04-20 RX ORDER — PROMETHAZINE HYDROCHLORIDE 25 MG/1
12.5 TABLET ORAL ONCE AS NEEDED
Status: DISCONTINUED | OUTPATIENT
Start: 2018-04-20 | End: 2018-04-20 | Stop reason: HOSPADM

## 2018-04-20 RX ORDER — ROCURONIUM BROMIDE 10 MG/ML
INJECTION, SOLUTION INTRAVENOUS AS NEEDED
Status: DISCONTINUED | OUTPATIENT
Start: 2018-04-20 | End: 2018-04-20 | Stop reason: SURG

## 2018-04-20 RX ORDER — HYDRALAZINE HYDROCHLORIDE 20 MG/ML
5 INJECTION INTRAMUSCULAR; INTRAVENOUS
Status: DISCONTINUED | OUTPATIENT
Start: 2018-04-20 | End: 2018-04-20 | Stop reason: HOSPADM

## 2018-04-20 RX ORDER — FENTANYL CITRATE 50 UG/ML
50 INJECTION, SOLUTION INTRAMUSCULAR; INTRAVENOUS
Status: DISCONTINUED | OUTPATIENT
Start: 2018-04-20 | End: 2018-04-20 | Stop reason: HOSPADM

## 2018-04-20 RX ORDER — HYDROCODONE BITARTRATE AND ACETAMINOPHEN 7.5; 325 MG/1; MG/1
1 TABLET ORAL ONCE AS NEEDED
Status: DISCONTINUED | OUTPATIENT
Start: 2018-04-20 | End: 2018-04-20 | Stop reason: HOSPADM

## 2018-04-20 RX ORDER — HYDROMORPHONE HCL 110MG/55ML
PATIENT CONTROLLED ANALGESIA SYRINGE INTRAVENOUS AS NEEDED
Status: DISCONTINUED | OUTPATIENT
Start: 2018-04-20 | End: 2018-04-20 | Stop reason: SURG

## 2018-04-20 RX ORDER — LIDOCAINE HYDROCHLORIDE 10 MG/ML
0.5 INJECTION, SOLUTION EPIDURAL; INFILTRATION; INTRACAUDAL; PERINEURAL ONCE AS NEEDED
Status: DISCONTINUED | OUTPATIENT
Start: 2018-04-20 | End: 2018-04-20 | Stop reason: HOSPADM

## 2018-04-20 RX ORDER — MIDAZOLAM HYDROCHLORIDE 1 MG/ML
2 INJECTION INTRAMUSCULAR; INTRAVENOUS
Status: DISCONTINUED | OUTPATIENT
Start: 2018-04-20 | End: 2018-04-20 | Stop reason: HOSPADM

## 2018-04-20 RX ORDER — ONDANSETRON 2 MG/ML
4 INJECTION INTRAMUSCULAR; INTRAVENOUS ONCE AS NEEDED
Status: DISCONTINUED | OUTPATIENT
Start: 2018-04-20 | End: 2018-04-20 | Stop reason: HOSPADM

## 2018-04-20 RX ORDER — NALOXONE HCL 0.4 MG/ML
0.2 VIAL (ML) INJECTION AS NEEDED
Status: DISCONTINUED | OUTPATIENT
Start: 2018-04-20 | End: 2018-04-20 | Stop reason: HOSPADM

## 2018-04-20 RX ORDER — LABETALOL HYDROCHLORIDE 5 MG/ML
5 INJECTION, SOLUTION INTRAVENOUS
Status: CANCELLED | OUTPATIENT
Start: 2018-04-20

## 2018-04-20 RX ORDER — PROPOFOL 10 MG/ML
VIAL (ML) INTRAVENOUS AS NEEDED
Status: DISCONTINUED | OUTPATIENT
Start: 2018-04-20 | End: 2018-04-20 | Stop reason: SURG

## 2018-04-20 RX ORDER — MIDAZOLAM HYDROCHLORIDE 1 MG/ML
1 INJECTION INTRAMUSCULAR; INTRAVENOUS
Status: DISCONTINUED | OUTPATIENT
Start: 2018-04-20 | End: 2018-04-20 | Stop reason: HOSPADM

## 2018-04-20 RX ORDER — HYDROMORPHONE HCL 110MG/55ML
0.5 PATIENT CONTROLLED ANALGESIA SYRINGE INTRAVENOUS
Status: DISCONTINUED | OUTPATIENT
Start: 2018-04-20 | End: 2018-04-20 | Stop reason: HOSPADM

## 2018-04-20 RX ADMIN — PROPOFOL 50 MG: 10 INJECTION, EMULSION INTRAVENOUS at 09:03

## 2018-04-20 RX ADMIN — FENTANYL CITRATE 50 MCG: 50 INJECTION INTRAMUSCULAR; INTRAVENOUS at 08:06

## 2018-04-20 RX ADMIN — ROCURONIUM BROMIDE 50 MG: 10 INJECTION INTRAVENOUS at 07:39

## 2018-04-20 RX ADMIN — FENTANYL CITRATE 50 MCG: 50 INJECTION INTRAMUSCULAR; INTRAVENOUS at 09:01

## 2018-04-20 RX ADMIN — LIDOCAINE HYDROCHLORIDE 40 MG: 20 INJECTION, SOLUTION INFILTRATION; PERINEURAL at 07:37

## 2018-04-20 RX ADMIN — SODIUM CHLORIDE, POTASSIUM CHLORIDE, SODIUM LACTATE AND CALCIUM CHLORIDE: 600; 310; 30; 20 INJECTION, SOLUTION INTRAVENOUS at 08:59

## 2018-04-20 RX ADMIN — FENTANYL CITRATE 50 MCG: 50 INJECTION INTRAMUSCULAR; INTRAVENOUS at 07:35

## 2018-04-20 RX ADMIN — VANCOMYCIN HYDROCHLORIDE 1500 MG: 10 INJECTION, POWDER, LYOPHILIZED, FOR SOLUTION INTRAVENOUS at 06:40

## 2018-04-20 RX ADMIN — FENTANYL CITRATE 50 MCG: 50 INJECTION INTRAMUSCULAR; INTRAVENOUS at 09:03

## 2018-04-20 RX ADMIN — MIDAZOLAM HYDROCHLORIDE 1 MG: 1 INJECTION, SOLUTION INTRAMUSCULAR; INTRAVENOUS at 06:56

## 2018-04-20 RX ADMIN — ROCURONIUM BROMIDE 10 MG: 10 INJECTION INTRAVENOUS at 08:33

## 2018-04-20 RX ADMIN — HYDROMORPHONE HYDROCHLORIDE 2 MG: 2 INJECTION INTRAMUSCULAR; INTRAVENOUS; SUBCUTANEOUS at 09:06

## 2018-04-20 RX ADMIN — FAMOTIDINE 20 MG: 10 INJECTION INTRAVENOUS at 06:56

## 2018-04-20 RX ADMIN — ONDANSETRON 4 MG: 2 INJECTION INTRAMUSCULAR; INTRAVENOUS at 07:54

## 2018-04-20 RX ADMIN — PROPOFOL 230 MG: 10 INJECTION, EMULSION INTRAVENOUS at 07:37

## 2018-04-20 RX ADMIN — SUGAMMADEX 300 MG: 100 INJECTION, SOLUTION INTRAVENOUS at 08:59

## 2018-04-20 RX ADMIN — SODIUM CHLORIDE, POTASSIUM CHLORIDE, SODIUM LACTATE AND CALCIUM CHLORIDE 9 ML/HR: 600; 310; 30; 20 INJECTION, SOLUTION INTRAVENOUS at 06:40

## 2018-04-20 NOTE — ANESTHESIA PROCEDURE NOTES
Airway  Urgency: elective    Airway not difficult    General Information and Staff    Patient location during procedure: OR  Anesthesiologist: MOOKIE BERMUDEZ  CRNA: YAKELIN SANTANA    Indications and Patient Condition  Indications for airway management: airway protection    Preoxygenated: yes  Mask difficulty assessment: 1 - vent by mask    Final Airway Details  Final airway type: endotracheal airway      Successful airway: ETT  Cuffed: yes   Successful intubation technique: direct laryngoscopy  Endotracheal tube insertion site: oral  Blade: Hellen  Blade size: #4  ETT size: 8.0 mm  Cormack-Lehane Classification: grade I - full view of glottis  Placement verified by: chest auscultation and capnometry   Measured from: lips  ETT to lips (cm): 23  Number of attempts at approach: 1    Additional Comments  Smooth IV/mask induction/intubation. Easy bag-mask ventilation. Orally intubated, easy, atraumatic, lips/teeth/mouth left intact, as preop. Direct visual of vocal cords. +ETCO2, bilateral breath sounds and equal.

## 2018-04-20 NOTE — ANESTHESIA POSTPROCEDURE EVALUATION
"Patient: Saurav Cotto Sr.    Procedure Summary     Date:  04/20/18 Room / Location:  [facility] OR [address] [place] [address] MAIN OR    Anesthesia Start:  0730 Anesthesia Stop:  0928    Procedure:  SPINAL CORD STIMULATOR INSERTION PHASE 2 (N/A Back) Diagnosis:       Lumbar radiculopathy      Spinal stenosis of lumbar region, unspecified whether neurogenic claudication present      (Lumbar radiculopathy [M54.16])      (Spinal stenosis of lumbar region, unspecified whether neurogenic claudication present [M48.061])    Surgeon:  Stevo Brian MD Provider:  Edis Altamirano MD    Anesthesia Type:  general ASA Status:  3          Anesthesia Type: general  Last vitals  BP   127/76 (04/20/18 1030)   Temp   36.6 °C (97.8 °F) (04/20/18 0926)   Pulse   86 (04/20/18 1030)   Resp   14 (04/20/18 1030)     SpO2   93 % (04/20/18 1030)     Post Anesthesia Care and Evaluation    Patient location during evaluation: PACU  Patient participation: complete - patient participated  Level of consciousness: awake and alert  Pain management: adequate  Airway patency: patent  Anesthetic complications: No anesthetic complications    Cardiovascular status: acceptable  Respiratory status: acceptable  Hydration status: acceptable    Comments: /76   Pulse 86   Temp 36.6 °C (97.8 °F) (Oral)   Resp 14   Ht 177.8 cm (70\")   Wt 91.6 kg (201 lb 15.1 oz)   SpO2 93%   BMI 28.98 kg/m²       "

## 2018-05-01 ENCOUNTER — OFFICE VISIT (OUTPATIENT)
Dept: PAIN MEDICINE | Facility: CLINIC | Age: 73
End: 2018-05-01

## 2018-05-01 VITALS
BODY MASS INDEX: 27.92 KG/M2 | DIASTOLIC BLOOD PRESSURE: 78 MMHG | HEART RATE: 98 BPM | TEMPERATURE: 98.4 F | SYSTOLIC BLOOD PRESSURE: 121 MMHG | HEIGHT: 70 IN | OXYGEN SATURATION: 98 % | RESPIRATION RATE: 16 BRPM | WEIGHT: 195 LBS

## 2018-05-01 DIAGNOSIS — T81.89XA INCISIONAL IRRITATION, INITIAL ENCOUNTER: ICD-10-CM

## 2018-05-01 DIAGNOSIS — M48.061 SPINAL STENOSIS OF LUMBAR REGION, UNSPECIFIED WHETHER NEUROGENIC CLAUDICATION PRESENT: ICD-10-CM

## 2018-05-01 DIAGNOSIS — M54.16 LUMBAR RADICULOPATHY: ICD-10-CM

## 2018-05-01 DIAGNOSIS — G89.29 OTHER CHRONIC PAIN: Primary | ICD-10-CM

## 2018-05-01 DIAGNOSIS — Z96.89 SPINAL CORD STIMULATOR STATUS: ICD-10-CM

## 2018-05-01 PROCEDURE — 99024 POSTOP FOLLOW-UP VISIT: CPT | Performed by: NURSE PRACTITIONER

## 2018-05-01 RX ORDER — SULFAMETHOXAZOLE AND TRIMETHOPRIM 800; 160 MG/1; MG/1
2 TABLET ORAL 2 TIMES DAILY
Qty: 40 TABLET | Refills: 0 | Status: CANCELLED | OUTPATIENT
Start: 2018-05-01

## 2018-05-01 RX ORDER — SULFAMETHOXAZOLE AND TRIMETHOPRIM 800; 160 MG/1; MG/1
2 TABLET ORAL 2 TIMES DAILY
Qty: 40 TABLET | Refills: 0 | Status: SHIPPED | OUTPATIENT
Start: 2018-05-01 | End: 2021-02-01

## 2018-05-01 NOTE — PROGRESS NOTES
"CHIEF COMPLAINT  Pt reports having 85-90% reduction of pain following the SCS implant on 4/20/18.    Subjective   Saurav Cotto Sr. is a 73 y.o. male  who presents to the office for follow-up of procedure.  He completed a SCS implant   on  4/20/18 performed by Dr. Brian for management of low back and leg pain. Patient reports 85-90% relief from the procedure.     History of Present Illness     PEG Assessment   What number best describes your pain on average in the past week?3  What number best describes how, during the past week, pain has interfered with your enjoyment of life?3  What number best describes how, during the past week, pain has interfered with your general activity?  3    The following portions of the patient's history were reviewed and updated as appropriate: allergies, current medications, past family history, past medical history, past social history, past surgical history and problem list.    Review of Systems   Constitutional: Negative for activity change (decreased).   HENT: Positive for hearing loss and trouble swallowing.    Respiratory: Positive for apnea (cpap). Negative for chest tightness and shortness of breath.    Cardiovascular: Negative for chest pain (Heart Transplant  in 2008).   Gastrointestinal: Positive for constipation. Negative for diarrhea and nausea.   Genitourinary: Positive for difficulty urinating. Negative for dysuria.   Musculoskeletal: Positive for back pain and gait problem.   Neurological: Positive for weakness (legs bilaterally). Negative for dizziness, seizures, light-headedness and numbness.   Psychiatric/Behavioral: Positive for sleep disturbance (occasional). Negative for suicidal ideas.       Vitals:    05/01/18 1121   BP: 121/78   Pulse: 98   Resp: 16   Temp: 98.4 °F (36.9 °C)   SpO2: 98%   Weight: 88.5 kg (195 lb)   Height: 177.8 cm (70\")   PainSc: 0-No pain  Comment: LBP ranges from 0(sitting) to 5/10   PainLoc: Back     Objective   Physical Exam "   Constitutional: He is oriented to person, place, and time. He appears well-developed and well-nourished.   Cardiovascular: Normal rate.    Pulmonary/Chest: Effort normal. No respiratory distress.   Neurological: He is alert and oriented to person, place, and time.   Skin: Skin is warm and dry.        Psychiatric: He has a normal mood and affect. His behavior is normal.   Nursing note and vitals reviewed.      Assessment/Plan   Saurav was seen today for back pain.    Diagnoses and all orders for this visit:    Other chronic pain    Spinal stenosis of lumbar region, unspecified whether neurogenic claudication present    Lumbar radiculopathy    Incisional irritation, initial encounter    Spinal cord stimulator status    Other orders  -     sulfamethoxazole-trimethoprim (BACTRIM DS,SEPTRA DS) 800-160 MG per tablet; Take 2 tablets by mouth 2 (Two) Times a Day.      --- Bactrim DS out of an abundance of caution for incisional irritation  --- Follow-up 10 days         WILDER REPORT    WILDER report has been reviewed and scanned into the patient's chart.    Date of last WILDER : 4/30/18     EMR Dragon/Transcription disclaimer:   Much of this encounter note is an electronic transcription/translation of spoken language to printed text. The electronic translation of spoken language may permit erroneous, or at times, nonsensical words or phrases to be inadvertently transcribed; Although I have reviewed the note for such errors, some may still exist.

## 2018-05-11 ENCOUNTER — OFFICE (OUTPATIENT)
Dept: URBAN - METROPOLITAN AREA CLINIC 70 | Facility: CLINIC | Age: 73
End: 2018-05-11

## 2018-05-11 VITALS
HEART RATE: 90 BPM | DIASTOLIC BLOOD PRESSURE: 90 MMHG | SYSTOLIC BLOOD PRESSURE: 128 MMHG | WEIGHT: 184 LBS | HEIGHT: 70 IN

## 2018-05-11 DIAGNOSIS — Z86.010 PERSONAL HISTORY OF COLONIC POLYPS: ICD-10-CM

## 2018-05-11 DIAGNOSIS — R94.5 ABNORMAL RESULTS OF LIVER FUNCTION STUDIES: ICD-10-CM

## 2018-05-11 DIAGNOSIS — R13.10 DYSPHAGIA, UNSPECIFIED: ICD-10-CM

## 2018-05-11 DIAGNOSIS — R11.2 NAUSEA WITH VOMITING, UNSPECIFIED: ICD-10-CM

## 2018-05-11 DIAGNOSIS — R93.8 ABNORMAL FINDINGS ON DIAGNOSTIC IMAGING OF OTHER SPECIFIED B: ICD-10-CM

## 2018-05-11 PROCEDURE — 99214 OFFICE O/P EST MOD 30 MIN: CPT | Performed by: INTERNAL MEDICINE

## 2018-05-11 RX ORDER — METOCLOPRAMIDE HYDROCHLORIDE 5 MG/1
TABLET ORAL
Qty: 180 | Refills: 7 | Status: COMPLETED
End: 2022-12-05

## 2018-05-11 RX ORDER — ONDANSETRON HYDROCHLORIDE 4 MG/1
TABLET, ORALLY DISINTEGRATING ORAL
Qty: 45 | Refills: 1 | Status: COMPLETED
Start: 2018-05-11 | End: 2019-12-20

## 2018-05-15 ENCOUNTER — OFFICE VISIT (OUTPATIENT)
Dept: PAIN MEDICINE | Facility: CLINIC | Age: 73
End: 2018-05-15

## 2018-05-15 VITALS
DIASTOLIC BLOOD PRESSURE: 78 MMHG | TEMPERATURE: 97.8 F | OXYGEN SATURATION: 98 % | BODY MASS INDEX: 27.77 KG/M2 | RESPIRATION RATE: 18 BRPM | SYSTOLIC BLOOD PRESSURE: 118 MMHG | HEIGHT: 70 IN | WEIGHT: 194 LBS | HEART RATE: 96 BPM

## 2018-05-15 DIAGNOSIS — Z96.89 SPINAL CORD STIMULATOR STATUS: ICD-10-CM

## 2018-05-15 DIAGNOSIS — L76.34 POSTOPERATIVE SEROMA OF SUBCUTANEOUS TISSUE AFTER NON-DERMATOLOGIC PROCEDURE: ICD-10-CM

## 2018-05-15 DIAGNOSIS — G89.29 OTHER CHRONIC PAIN: Primary | ICD-10-CM

## 2018-05-15 DIAGNOSIS — M54.16 LUMBAR RADICULOPATHY: ICD-10-CM

## 2018-05-15 DIAGNOSIS — M48.061 SPINAL STENOSIS OF LUMBAR REGION, UNSPECIFIED WHETHER NEUROGENIC CLAUDICATION PRESENT: ICD-10-CM

## 2018-05-15 PROCEDURE — 99214 OFFICE O/P EST MOD 30 MIN: CPT | Performed by: NURSE PRACTITIONER

## 2018-05-15 NOTE — PROGRESS NOTES
CHIEF COMPLAINT  Patient states his back pain has decreased since last visit and that he is receiving about 95% of pain relief since his [employer] SCS Implant. He states the incision seems to swell and inflame at the incision site if he sleeps on either of his sides. NO pain associated and it is not warm to the touch. His implant was on 4/20/2018  Initial Evaluation by Penelope PANCHAL.  Tien   Saurav Cotto . is a 73 y.o. male  who presents to the office for follow-up.He has a history of chronic back pain and radiculopathy.  Was last evaluated by Shruthi PANCHAL 5-1-18. Was prescribed Bactrim DS 2 po BID.  He did not take this due to GI issues recently.    Has been having increasing swelling of his midline incision area. THe area is not red or warm. It is not exquisitely tender to touch. Patient and wife notice that it keeps getting bigger over the last few weeks. Denies any fever or chills. Requests draining of area, since is keeps getting bigger.  AS soon as he lays down, the area increases in size. It goes down substantially when he stands and walks. Denies any headache.     Complains of pain in his low back and leg. Today his pain is 2/10VAS. Continues with SCS. Is happy with his programming.   Presents with wife who helps with history.   Back Pain   This is a chronic problem. The current episode started more than 1 year ago. Episode frequency: improved with BS SCS. The pain is at a severity of 2/10. The pain is mild. Associated symptoms include weakness. Pertinent negatives include no bladder incontinence, bowel incontinence, chest pain, dysuria, fever, headaches or numbness.      PEG Assessment   What number best describes your pain on average in the past week?2  What number best describes how, during the past week, pain has interfered with your enjoyment of life?2  What number best describes how, during the past week, pain has interfered with your general activity?  2    The  "following portions of the patient's history were reviewed and updated as appropriate: allergies, current medications, past family history, past medical history, past social history, past surgical history and problem list.    Review of Systems   Constitutional: Negative for chills and fever.   Respiratory: Negative for shortness of breath.    Cardiovascular: Negative for chest pain.   Gastrointestinal: Positive for constipation, diarrhea, nausea and vomiting. Negative for bowel incontinence.   Genitourinary: Negative for bladder incontinence, difficulty urinating, dysuria and enuresis.   Musculoskeletal: Positive for back pain.   Neurological: Positive for weakness. Negative for dizziness, light-headedness, numbness and headaches.   Psychiatric/Behavioral: Positive for sleep disturbance. Negative for confusion, hallucinations, self-injury and suicidal ideas. The patient is not nervous/anxious.        Vitals:    05/15/18 1500   BP: 118/78   Pulse: 96   Resp: 18   Temp: 97.8 °F (36.6 °C)   SpO2: 98%   Weight: 88 kg (194 lb)   Height: 177.8 cm (70\")   PainSc:   2   PainLoc: Back     Objective   Physical Exam   Constitutional: He is oriented to person, place, and time. He appears well-developed and well-nourished.   Cardiovascular: Normal rate.    Pulmonary/Chest: Effort normal.   Neurological: He is alert and oriented to person, place, and time.   Skin: Skin is warm and dry.        Psychiatric: He has a normal mood and affect. His behavior is normal.   Nursing note and vitals reviewed.      Informed consent was obtained from patient. He wants to proceed with drainage of post-operative seroma. Reviewed risks/beenfits/expectations. Patient verbalized understanding and wishes to proceed. Area was identified. Cleaned X2 with Hibiclens. 2 ml of Lidocaine was injected into area in 4 different positions. With insertion of needle for Lidocaine, moderate amounts of clear fluid was released from seroma prior to injection of " numbing agent. The area was localized with Lidocaine with a 25 gauge needle. This needle was removed and discarded. The seroma continued to drain clear fluid. A 18 inch needle was inserted to aspirate for seroma fluid. Approximately 3 ml was aspirated of clear fluid. This will be sent for testing for beta transferrin. After the procedure, the area was cleaned and 4 small band-aids were applied.  Patient noted no pain following procedure. The area had decreased significantly, both noted by myself and his wife.    Assessment/Plan   Saurav was seen today for back pain.    Diagnoses and all orders for this visit:    Other chronic pain    Spinal cord stimulator status    Spinal stenosis of lumbar region, unspecified whether neurogenic claudication present    Lumbar radiculopathy    Postoperative seroma of subcutaneous tissue after non-dermatologic procedure  -     Beta 2 Transferrin - Body Fluid,; Future      --- drainage of seroma. Will test for spinal fluid. Beta Transferrin ordered.   --- ABD binder. Patient has one at home. Will start to wear until next office visit.   --- Hold antibiotic at this time.  --- Reprogramming for SCS in future PRN.  --- Extensive discussion regarding potential causes and reasons for seroma. Also reviewed expectations of if there were to be a CSF leak, including but no limited to fever, chills, stiff neck, headache, flu-like symptoms, increased tenderness and redness to incisions, as well as drainage or swelling. If patient notices this, he is to call office immediately. Patient and wife verbalized understanding.  --- Follow-up 7-10 days or sooner if needed.       WILDER REPORT  WILDER report has been reviewed and scanned into the patient's chart.    As the clinician, I personally reviewed the WILDER from 5-14-18 while the patient was in the office today.          EMR Dragon/Transcription disclaimer:   Much of this encounter note is an electronic transcription/translation of spoken  language to printed text. The electronic translation of spoken language may permit erroneous, or at times, nonsensical words or phrases to be inadvertently transcribed; Although I have reviewed the note for such errors, some may still exist.

## 2018-05-16 ENCOUNTER — TELEPHONE (OUTPATIENT)
Dept: PAIN MEDICINE | Facility: CLINIC | Age: 73
End: 2018-05-16

## 2018-05-16 NOTE — TELEPHONE ENCOUNTER
Fabiana from [facility] called to inform us there was not enough/insufficient fluid in pt's specimen from yesterday to test it. Penelope PUENTES is aware.

## 2018-05-21 ENCOUNTER — TELEPHONE (OUTPATIENT)
Dept: PAIN MEDICINE | Facility: CLINIC | Age: 73
End: 2018-05-21

## 2018-05-21 NOTE — TELEPHONE ENCOUNTER
I spoke with Mr Yadiel who is pleased with the SCS. He will be here on 5/23/18 for a follow up and says the puffiness over the SCS is somewhat smaller

## 2018-05-21 NOTE — TELEPHONE ENCOUNTER
Please call and let patient know that the lab did not think there was enough fluid for testing for CSF. He has an appointment on 5-23-18. We will follow-up then. Thanks. mine

## 2018-06-06 ENCOUNTER — OFFICE VISIT (OUTPATIENT)
Dept: PAIN MEDICINE | Facility: CLINIC | Age: 73
End: 2018-06-06

## 2018-06-06 VITALS
TEMPERATURE: 97.9 F | HEIGHT: 70 IN | SYSTOLIC BLOOD PRESSURE: 115 MMHG | DIASTOLIC BLOOD PRESSURE: 75 MMHG | RESPIRATION RATE: 16 BRPM | OXYGEN SATURATION: 97 % | WEIGHT: 193 LBS | BODY MASS INDEX: 27.63 KG/M2 | HEART RATE: 93 BPM

## 2018-06-06 DIAGNOSIS — Z96.89 SPINAL CORD STIMULATOR STATUS: ICD-10-CM

## 2018-06-06 DIAGNOSIS — M54.16 LUMBAR RADICULOPATHY: ICD-10-CM

## 2018-06-06 DIAGNOSIS — M48.061 SPINAL STENOSIS OF LUMBAR REGION, UNSPECIFIED WHETHER NEUROGENIC CLAUDICATION PRESENT: ICD-10-CM

## 2018-06-06 DIAGNOSIS — G89.29 OTHER CHRONIC PAIN: Primary | ICD-10-CM

## 2018-06-06 PROCEDURE — 99212 OFFICE O/P EST SF 10 MIN: CPT | Performed by: NURSE PRACTITIONER

## 2018-06-06 RX ORDER — METOCLOPRAMIDE HYDROCHLORIDE 5 MG/5ML
SOLUTION ORAL
COMMUNITY
End: 2021-06-22

## 2018-06-06 NOTE — PROGRESS NOTES
"CHIEF COMPLAINT  Since SCS implant, Pt states low back pain is decreased \"by 95%\".  Pt states he is no longer taking oxycodone or tramadol.    Subjective   Saurav Cotto Sr. is a 73 y.o. male  who presents to the office for follow-up.He has a history of chronic back and leg pain. Reports this is improved since last office visit.    He completed a BS SCS implant   on  4/20/18 performed by Dr. Brian for management of low back and leg pain.    Complains of pain in his low back and leg. Today his pain is 1-2/10VAS. Pain ranges from 1-4/10VAS. Describes the pain as intermittent and improved. Pain does not interfere with his sleep. Pain is worse during the day-time, with activity. Pain increases with walking and standing; pain decreases with laying, sitting and SCS. He is reporting 95% relief with SCS. ADL's by self.    Wife is awaiting SCS implant. Has already had trial.    No longer having any issues with seroma. This has resolved.       Back Pain   This is a chronic problem. The current episode started more than 1 year ago. Episode frequency: improved with BS SCS. The pain is at a severity of 1/10. The pain is mild. The pain is worse during the day. Associated symptoms include weakness (legs bilat.). Pertinent negatives include no bladder incontinence, bowel incontinence, chest pain, dysuria, fever, headaches or numbness. He has tried muscle relaxant for the symptoms.      PEG Assessment   What number best describes your pain on average in the past week?2  What number best describes how, during the past week, pain has interfered with your enjoyment of life?2  What number best describes how, during the past week, pain has interfered with your general activity?  2    The following portions of the patient's history were reviewed and updated as appropriate: allergies, current medications, past family history, past medical history, past social history, past surgical history and problem list.    Review of Systems " "  Constitutional: Negative for activity change, chills and fever.   Respiratory: Negative for shortness of breath.    Cardiovascular: Negative for chest pain.   Gastrointestinal: Positive for constipation, diarrhea, nausea and vomiting. Negative for bowel incontinence.   Genitourinary: Negative for bladder incontinence, difficulty urinating, dysuria and enuresis.   Musculoskeletal: Positive for arthralgias and back pain.   Neurological: Positive for weakness (legs bilat.). Negative for dizziness, light-headedness, numbness and headaches.   Psychiatric/Behavioral: Negative for confusion, hallucinations, self-injury, sleep disturbance and suicidal ideas. The patient is not nervous/anxious.        Vitals:    06/06/18 1400   BP: 115/75   Pulse: 93   Resp: 16   Temp: 97.9 °F (36.6 °C)   SpO2: 97%   Weight: 87.5 kg (193 lb)   Height: 177.8 cm (70\")   PainSc: 1  Comment: LBP bilaterally ranges from 1-4/10   PainLoc: Back     Objective   Physical Exam   Constitutional: He is oriented to person, place, and time. He appears well-developed and well-nourished.   Cardiovascular: Normal rate.    Pulmonary/Chest: Effort normal.   Neurological: He is alert and oriented to person, place, and time.   Reflex Scores:       Patellar reflexes are 1+ on the right side and 1+ on the left side.  Skin: Skin is warm and dry.        Psychiatric: He has a normal mood and affect. His behavior is normal.   Nursing note and vitals reviewed.        Assessment/Plan   Saurav was seen today for back pain.    Diagnoses and all orders for this visit:    Other chronic pain    Spinal stenosis of lumbar region, unspecified whether neurogenic claudication present    Lumbar radiculopathy    Spinal cord stimulator status      --- Reprogramming with BS SCS as needed. He is currently happy with his coverage. Declines reprogramming.   --- Follow-up PRN       WILDER REPORT    WILDER report has been reviewed and scanned into the patient's chart.    As the " clinician, I personally reviewed the WILDER from 6-5-18 while the patient was in the office today.          EMR Dragon/Transcription disclaimer:   Much of this encounter note is an electronic transcription/translation of spoken language to printed text. The electronic translation of spoken language may permit erroneous, or at times, nonsensical words or phrases to be inadvertently transcribed; Although I have reviewed the note for such errors, some may still exist.

## 2019-12-20 ENCOUNTER — OFFICE (OUTPATIENT)
Dept: URBAN - METROPOLITAN AREA CLINIC 75 | Facility: CLINIC | Age: 74
End: 2019-12-20

## 2019-12-20 VITALS
SYSTOLIC BLOOD PRESSURE: 142 MMHG | WEIGHT: 219 LBS | HEART RATE: 77 BPM | HEIGHT: 70 IN | DIASTOLIC BLOOD PRESSURE: 82 MMHG

## 2019-12-20 DIAGNOSIS — K31.84 GASTROPARESIS: ICD-10-CM

## 2019-12-20 DIAGNOSIS — R11.2 NAUSEA WITH VOMITING, UNSPECIFIED: ICD-10-CM

## 2019-12-20 DIAGNOSIS — Z86.010 PERSONAL HISTORY OF COLONIC POLYPS: ICD-10-CM

## 2019-12-20 DIAGNOSIS — K21.9 GASTRO-ESOPHAGEAL REFLUX DISEASE WITHOUT ESOPHAGITIS: ICD-10-CM

## 2019-12-20 DIAGNOSIS — E11.9 TYPE 2 DIABETES MELLITUS WITHOUT COMPLICATIONS: ICD-10-CM

## 2019-12-20 DIAGNOSIS — I10 ESSENTIAL (PRIMARY) HYPERTENSION: ICD-10-CM

## 2019-12-20 PROCEDURE — 99214 OFFICE O/P EST MOD 30 MIN: CPT | Performed by: INTERNAL MEDICINE

## 2019-12-20 RX ORDER — METOCLOPRAMIDE HYDROCHLORIDE 5 MG/1
TABLET ORAL
Qty: 180 | Refills: 7 | Status: COMPLETED
End: 2022-12-05

## 2021-01-21 ENCOUNTER — TELEPHONE (OUTPATIENT)
Dept: ORTHOPEDICS | Facility: OTHER | Age: 76
End: 2021-01-21

## 2021-01-21 NOTE — TELEPHONE ENCOUNTER
Called and spoke to pt, told him he needs an ov. Pt verbalized understanding. Told chapincito to call pt tomorrow morning for scheduling appt.

## 2021-01-21 NOTE — TELEPHONE ENCOUNTER
Provider: CHARLES REYNOLDS   Caller: MRS MAYO   Relationship to Patient: WIFE   Phone Number: [phone]   Reason for Call: SPOUSE WAS WANTING TO SPEAK WITH SOMEONE ABOUT PATIENT GETTING A PAIN PUMP, AND SPOUSE WAS WANTING TO KNOW IF DR BRANDT OR CHARLES DID THESE. PATIENT CAN BE REACHED @ THE ABOVE NUMBER.

## 2021-01-25 NOTE — TELEPHONE ENCOUNTER
We can evaluate in office and see where we need to go. That's the best I can offer right now. OF note, I do think it is GREATLY important to have reps present for evaluation. If he has damaged a lead, or a lead migrated, or battery is dead, that greatly affects our plan. But let's just get him in office and see. Thanks. Bb

## 2021-01-25 NOTE — TELEPHONE ENCOUNTER
Spoke with pt he said he was reprogrammed 6 months ago, and doesn't want to be reprogrammed, he insists he needs a pain pump, I told him your recommendation and he is refusing to contact rep for reprogramming, I will let Darlene or Sonia know to contact pt for appt. Please advise. Thank you.

## 2021-01-25 NOTE — TELEPHONE ENCOUNTER
Needs office visit. It's been 2.5 years since we've seen him. Also recommend reprogramming SCS prior to clinic evaluation to see if this helps his issue(assuming same issue as before)

## 2021-01-29 ENCOUNTER — OFFICE (OUTPATIENT)
Dept: URBAN - METROPOLITAN AREA CLINIC 75 | Facility: CLINIC | Age: 76
End: 2021-01-29

## 2021-01-29 VITALS
HEIGHT: 70 IN | RESPIRATION RATE: 12 BRPM | WEIGHT: 198 LBS | TEMPERATURE: 98.2 F | HEART RATE: 74 BPM | OXYGEN SATURATION: 97 %

## 2021-01-29 DIAGNOSIS — R11.2 NAUSEA WITH VOMITING, UNSPECIFIED: ICD-10-CM

## 2021-01-29 DIAGNOSIS — K31.84 GASTROPARESIS: ICD-10-CM

## 2021-01-29 DIAGNOSIS — K59.00 CONSTIPATION, UNSPECIFIED: ICD-10-CM

## 2021-01-29 DIAGNOSIS — R93.8 ABNORMAL FINDINGS ON DIAGNOSTIC IMAGING OF OTHER SPECIFIED B: ICD-10-CM

## 2021-01-29 DIAGNOSIS — K21.9 GASTRO-ESOPHAGEAL REFLUX DISEASE WITHOUT ESOPHAGITIS: ICD-10-CM

## 2021-01-29 DIAGNOSIS — Z86.010 PERSONAL HISTORY OF COLONIC POLYPS: ICD-10-CM

## 2021-01-29 PROCEDURE — 99214 OFFICE O/P EST MOD 30 MIN: CPT | Performed by: INTERNAL MEDICINE

## 2021-02-01 ENCOUNTER — OFFICE VISIT (OUTPATIENT)
Dept: PAIN MEDICINE | Facility: CLINIC | Age: 76
End: 2021-02-01

## 2021-02-01 ENCOUNTER — HOSPITAL ENCOUNTER (OUTPATIENT)
Dept: GENERAL RADIOLOGY | Facility: HOSPITAL | Age: 76
Discharge: HOME | End: 2021-02-01

## 2021-02-01 VITALS
WEIGHT: 200 LBS | OXYGEN SATURATION: 95 % | TEMPERATURE: 97.7 F | RESPIRATION RATE: 20 BRPM | HEIGHT: 71 IN | HEART RATE: 104 BPM | SYSTOLIC BLOOD PRESSURE: 133 MMHG | BODY MASS INDEX: 28 KG/M2 | DIASTOLIC BLOOD PRESSURE: 80 MMHG

## 2021-02-01 DIAGNOSIS — M48.061 SPINAL STENOSIS OF LUMBAR REGION, UNSPECIFIED WHETHER NEUROGENIC CLAUDICATION PRESENT: ICD-10-CM

## 2021-02-01 DIAGNOSIS — G89.29 OTHER CHRONIC PAIN: Primary | ICD-10-CM

## 2021-02-01 DIAGNOSIS — M54.16 LUMBAR RADICULOPATHY: ICD-10-CM

## 2021-02-01 DIAGNOSIS — Z96.89 SPINAL CORD STIMULATOR STATUS: ICD-10-CM

## 2021-02-01 DIAGNOSIS — M54.9 MID BACK PAIN: ICD-10-CM

## 2021-02-01 PROCEDURE — 72072 X-RAY EXAM THORAC SPINE 3VWS: CPT

## 2021-02-01 PROCEDURE — 72100 X-RAY EXAM L-S SPINE 2/3 VWS: CPT

## 2021-02-01 PROCEDURE — 99214 OFFICE O/P EST MOD 30 MIN: CPT | Performed by: NURSE PRACTITIONER

## 2021-02-01 RX ORDER — CHOLECALCIFEROL (VITAMIN D3) 125 MCG
CAPSULE ORAL DAILY
Status: ON HOLD | COMMUNITY
End: 2021-06-18

## 2021-02-01 RX ORDER — TESTOSTERONE CYPIONATE 200 MG/ML
INJECTION, SOLUTION INTRAMUSCULAR
COMMUNITY
Start: 2021-01-22 | End: 2023-02-20

## 2021-02-01 RX ORDER — MEMANTINE HYDROCHLORIDE 28 MG/1
CAPSULE, EXTENDED RELEASE ORAL
COMMUNITY
Start: 2020-12-11 | End: 2022-03-23

## 2021-02-01 RX ORDER — METOCLOPRAMIDE 5 MG/1
TABLET ORAL
COMMUNITY
Start: 2021-01-20 | End: 2023-02-20

## 2021-02-01 RX ORDER — AMOXICILLIN 250 MG
2 CAPSULE ORAL DAILY
COMMUNITY

## 2021-02-01 RX ORDER — DULOXETIN HYDROCHLORIDE 60 MG/1
CAPSULE, DELAYED RELEASE ORAL
COMMUNITY
End: 2022-03-23

## 2021-02-01 RX ORDER — OXYCODONE HYDROCHLORIDE AND ACETAMINOPHEN 5; 325 MG/1; MG/1
TABLET ORAL
COMMUNITY
Start: 2020-11-11 | End: 2021-08-10 | Stop reason: ALTCHOICE

## 2021-02-01 RX ORDER — RIVASTIGMINE TARTRATE 6 MG/1
CAPSULE ORAL
COMMUNITY
Start: 2020-09-16 | End: 2022-03-23

## 2021-02-01 RX ORDER — MYCOPHENOLATE MOFETIL 250 MG/1
750 CAPSULE ORAL
COMMUNITY
Start: 2020-11-24

## 2021-02-01 RX ORDER — TACROLIMUS 1 MG/1
1 CAPSULE ORAL
COMMUNITY
Start: 2020-10-02 | End: 2021-06-22

## 2021-02-01 RX ORDER — DULOXETIN HYDROCHLORIDE 60 MG/1
30 CAPSULE, DELAYED RELEASE ORAL 2 TIMES DAILY
COMMUNITY
Start: 2021-01-20 | End: 2021-06-22

## 2021-02-01 RX ORDER — ALBUTEROL SULFATE 90 UG/1
2 AEROSOL, METERED RESPIRATORY (INHALATION)
COMMUNITY
End: 2021-06-22

## 2021-02-01 RX ORDER — LOSARTAN POTASSIUM 50 MG/1
TABLET ORAL
COMMUNITY
Start: 2021-01-07 | End: 2022-03-23

## 2021-02-01 RX ORDER — TACROLIMUS 0.5 MG/1
CAPSULE ORAL
Status: ON HOLD | COMMUNITY
Start: 2020-12-02 | End: 2021-06-18

## 2021-02-01 RX ORDER — SPIRONOLACTONE 25 MG/1
TABLET ORAL
COMMUNITY
Start: 2020-11-27 | End: 2022-03-23

## 2021-02-01 RX ORDER — ATORVASTATIN CALCIUM 10 MG/1
TABLET, FILM COATED ORAL
COMMUNITY
Start: 2020-08-01

## 2021-02-02 NOTE — PROGRESS NOTES
Reviewed xrays. SCS appears to be in appropriate place BUT I sent xray to Dr rodriguez to review as well. THere are no acute compression fractures. There is severe disc space narrowing. There is also some worsening arthritis within the small joints of his back. I think we still try to perform SCS reprogramming and get CT scan at this time to evaluate the issue further. Thanks. RENATE

## 2021-02-02 NOTE — PROGRESS NOTES
Informed pt of results today. Pt verbalized understanding. Gave pt Keaton's cell number to have pt call and schedule reprogramming. Pt would also like you to order CT. Can you please order CT? Thank you.

## 2021-02-03 NOTE — PROGRESS NOTES
"Leads look midline and also right of midline.  I talked to Keaton/Allison about reprogram.  They will actually try their new \"FAST\" algorithm.  I think they should be able to bring something into focus on that left (midline) lead.  "

## 2021-03-22 ENCOUNTER — TELEPHONE (OUTPATIENT)
Dept: PAIN MEDICINE | Facility: CLINIC | Age: 76
End: 2021-03-22

## 2021-03-22 NOTE — TELEPHONE ENCOUNTER
Obtained lumbar MRI. Shows nothing acute. Shows degenerative changes which have worsened over time. Shows scoliosis as well.     Is he feeling better since having SCS reprogrammed?     I reviewed MRI with DR Brian. He does recommend a neurosurgical consultation due to worsening of arthritis and narrowing of spine. We can set him up for this if he wishes us to refer him.    Let me know. Thanks. RENATE

## 2021-03-23 DIAGNOSIS — M48.061 SPINAL STENOSIS OF LUMBAR REGION, UNSPECIFIED WHETHER NEUROGENIC CLAUDICATION PRESENT: Primary | ICD-10-CM

## 2021-03-23 NOTE — TELEPHONE ENCOUNTER
Try to reach out to SCS people and consider reprogramming again. IN the meantime, I can refer to neurosurgeon or he can see Dr Bojorquez. I realize he wasn't a surgical candidate several years ago. However, if there is something that requires surgical intervention, he needs to be fully evaluated for this. If nothing to offer from neurosurgery, and SCS is not improving, we can consider IT pump. THanks. BB

## 2021-03-23 NOTE — TELEPHONE ENCOUNTER
He doesn't have to reprogram SCS but I would try to reach out again and do this again since the device is already implanted.  As for the neurosurgical evaluation, I reviewed MRI with Dr Brian and he agreed patient needed to neurosurgery to rule out surgery. If surgery is indicated, just putting a pain pump in won't fix this. When I last saw him, I explained this is a process and does not happen over night unfortunately. We have to make sure he truly is a suitable pump candidate.  I see he has completed his psychological evaluation. Has he completed pain pump education session as well?  I will place referral to Dr Bojorquez. I will include Dr Brian in on this thread. If he is agreeable, we can try to send off for authorization for pump trial, but again, this would be contingent on him seeing neurosurgery and found not to be a suitable neurosurgery candidate, which is part of the authorization process for insurance, much like education and psychological evaluation. I am sorry he is frustrated but unfortunately he did not return for quite some time and now we have to play a little catch up to make sure he is treated appropriately and safely. Thanks. RENATE

## 2021-03-23 NOTE — TELEPHONE ENCOUNTER
Informed pt of MRI results today. Pt verbalized understanding. Pt sts he was reprogrammed 3 weeks ago and in the beginning his pain was improved but his pain has worsened over the last 2 weeks. Pt sts he saw Dr. Bojorquez 2-3 years ago and was told he was not a surgical candidate, but will go to neurosurgery if Dr. Brian thinks he should. I told him Dr. Brian's recommendation was neurosurgical consult. Please advise. Thank you.

## 2021-03-23 NOTE — TELEPHONE ENCOUNTER
Called and spoke with pt. Pt sts he doesn't want to contact SCS rep for reprogramming, sts he already did that 3 weeks ago, feels like he is going thru the run around and wants to know what he has to do to get a pain pump. I explained to him Dr. Brian recommends seeing neurosurgery and if they have nothing to offer and if SCS is not improving then we can consider pain pump. Pt verbalized understanding.  Pt sts he would like to be referred back to Dr. Bojorquez. Please advise. Thank you.

## 2021-03-24 ENCOUNTER — PREP FOR SURGERY (OUTPATIENT)
Dept: SURGERY | Facility: SURGERY CENTER | Age: 76
End: 2021-03-24

## 2021-03-24 DIAGNOSIS — M48.061 SPINAL STENOSIS OF LUMBAR REGION, UNSPECIFIED WHETHER NEUROGENIC CLAUDICATION PRESENT: Primary | ICD-10-CM

## 2021-03-24 DIAGNOSIS — M54.16 LUMBAR RADICULOPATHY: ICD-10-CM

## 2021-03-24 RX ORDER — SODIUM CHLORIDE 0.9 % (FLUSH) 0.9 %
10 SYRINGE (ML) INJECTION EVERY 12 HOURS SCHEDULED
Status: CANCELLED | OUTPATIENT
Start: 2021-03-24

## 2021-03-24 RX ORDER — SODIUM CHLORIDE 0.9 % (FLUSH) 0.9 %
10 SYRINGE (ML) INJECTION AS NEEDED
Status: CANCELLED | OUTPATIENT
Start: 2021-03-24

## 2021-03-24 NOTE — TELEPHONE ENCOUNTER
Have placed order for neurosurgical evaluation and pump trial(pending neurosurgical evaluation). Just wanted everyone in the loop. Thanks.BB

## 2021-03-24 NOTE — TELEPHONE ENCOUNTER
Do you want me to placed order for pump trial so we can work on authorization and see if it goes through? Thanks. BB

## 2021-04-15 ENCOUNTER — TELEPHONE (OUTPATIENT)
Dept: PAIN MEDICINE | Facility: CLINIC | Age: 76
End: 2021-04-15

## 2021-04-15 NOTE — TELEPHONE ENCOUNTER
The patient called wanting to know the status of his pain pump trial. He states that he has been educated and his psych eval has been scanned in the chart since February and he stated that Dr. Bojorquez does not recommend surgery. He wants to know what his psych eval states and when can he have the pain pump trial. Please advise.

## 2021-06-07 ENCOUNTER — TELEPHONE (OUTPATIENT)
Dept: PAIN MEDICINE | Facility: CLINIC | Age: 76
End: 2021-06-07

## 2021-06-07 NOTE — TELEPHONE ENCOUNTER
Provider:     Caller: PATIENT    Relationship to Patient: SELF      Phone Number:  [phone]    Hub staff attempted to follow warm transfer process and was unsuccessful         PT. CALLING  ABOUT PAIN PUMP- STATES THAT HE HAS BEEN WAITING FOR A COUPLE OF MONTHS TO GET THIS SET UP.   STATES THAT HE HAS HAD EVERY THING DONE THAT WAS REQUESTED. STATES THAT HE DID HAVE THE PSYCHOLOGICAL EXAM WITH DR. SHEN, AND STATES THAT HE HAS BEEN CLEARED AS A GOOD CANDIDATE FOR PAIN PUMP.   HE IS ASKING IF SOMEONE CAN PLEASE CALL HIM TO GET HIM SCHEDULED.   PLEASE CALL TO ADVISE.

## 2021-06-16 NOTE — SIGNIFICANT NOTE
Patient educated on the following :    - If you are receiving Sedation for your procedure Nothing to Eat 6 hours and only clear liquids for 2 hours prior to your procedure.    -Your required COVID Test is Scheduled on          Between the Hours of 6824-7220  -You will only be notified if your COVID test Result is POSITIVE  -The importance of reducing your number of contacts by self quarantining after you COVID test until the date of your PROCEDURE  -You will need to have someone drive you home after your PROCEDURE and remain with you for 24 hours after the PROCEDURE  - The date of your PROCEDURE, your ride is welcome to either remain in our parking lot or within 10-15 minutes of [employer]  -You will need to arrive at    7:30       on   6/18        for your PROCEDURE  -Please contact [employer] [facility] at: [phone] with any questions and/or concerns

## 2021-06-17 PROCEDURE — S0260 H&P FOR SURGERY: HCPCS | Performed by: ANESTHESIOLOGY

## 2021-06-17 NOTE — H&P
Brief Pre-procedural / Pre-operative H&P        -----    Pertinent Diagnosis:   Low back pain, spinal stenosis, lumbar radiculopathy.    Proposed Procedure: Intrathecal infusion trial, as a spinal injection of ziconotide      Tien Cotto Sr. is a 76 y.o. male  who presents for intervention.  He has a history of significant back pain.      History of Present Illness     This gentleman had a spinal cord stimulator trial that was successful performed by colleague.  I implanted a spinal cord stimulator device for management of his back pain is radicular symptoms.      He initially had very significant relief reporting 95% relief.  He has had worsening of his back pain and states that the stimulator while helping some is not helping as much as it used to.  He expressed interest in intrathecal infusion therapy because of the worsening pain.      -------    The following portions of the patient's history were reviewed and updated as appropriate: allergies, current medications, past family history, past medical history, past social history, past surgical history and problem list.    Allergies   Allergen Reactions   • Coreg [Carvedilol] Hives   • Singulair [Montelukast Sodium] Other (See Comments)     weakness   • Theophyllines Other (See Comments)     weakness         Current Facility-Administered Medications:   •  lactated ringers infusion 1,000 mL, 1,000 mL, Intravenous, Continuous, Stevo Brian MD, Last Rate: 25 mL/hr at 06/18/21 0824, 1,000 mL at 06/18/21 0824  •  sodium chloride 0.9 % flush 10 mL, 10 mL, Intravenous, Q12H, Stevo Brian MD  •  sodium chloride 0.9 % flush 10 mL, 10 mL, Intravenous, PRN, Stevo Brian MD    No current facility-administered medications on file prior to encounter.     Current Outpatient Medications on File Prior to Encounter   Medication Sig Dispense Refill   • amLODIPine (NORVASC) 5 MG tablet Take 10 mg by mouth 2 (Two) Times a Day.     • aspirin 81 MG EC  tablet Take 81 mg by mouth. HOLD PRIOR TO SURGERY     • atorvastatin (LIPITOR) 10 MG tablet      • baclofen (LIORESAL) 10 MG tablet Take 10 mg by mouth 3 (Three) Times a Day.     • busPIRone (BUSPAR) 10 MG tablet Take 10 mg by mouth 3 (Three) Times a Day.     • chlorthalidone (HYGROTON) 25 MG tablet Take 25 mg by mouth Every Morning.     • Cholecalciferol (VITAMIN D3) 2000 units tablet Take 1 tablet by mouth Every Evening. HOLD PRIOR TO SURGERY     • cyanocobalamin 1000 MCG/ML injection Inject 1,000 mcg into the shoulder, thigh, or buttocks Every 28 (Twenty-Eight) Days.     • DULoxetine (CYMBALTA) 60 MG capsule 30 mg 2 (Two) Times a Day.     • DULoxetine (CYMBALTA) 60 MG capsule      • losartan (COZAAR) 50 MG tablet      • Magnesium Oxide (MAG-OXIDE PO) Take 1,200 mg by mouth.     • memantine (NAMENDA XR) 28 MG capsule sustained-release 24 hr extended release capsule Take 28 mg by mouth Daily. TAKES @@ NOON     • memantine (NAMENDA XR) 28 MG capsule sustained-release 24 hr extended release capsule TAKE ONE CAPSULE BY MOUTH DAILY     • metoclopramide (REGLAN) 5 MG tablet      • metoclopramide (REGLAN) 5 MG/5ML solution Take  by mouth 4 (Four) Times a Day Before Meals & at Bedtime.     • Morphine (MS CONTIN) 60 MG 12 hr tablet Take 30 mg by mouth Every 12 (Twelve) Hours.     • Multiple Vitamin (MULTIVITAMIN) tablet Take 1 tablet by mouth Daily. HOLD PRIOR TO SURGERY     • mycophenolate (CELLCEPT) 250 MG capsule Take 750 mg by mouth Every 12 (Twelve) Hours. TAKES @ 0900 AM  TAKES @ 1000AM     • mycophenolate (CELLCEPT) 250 MG capsule Take 750 mg by mouth.     • Naloxegol Oxalate (MOVANTIK) 25 MG tablet Take 25 mg by mouth Daily As Needed.     • omeprazole (priLOSEC) 40 MG capsule Take 40 mg by mouth Every Morning.     • oxyCODONE-acetaminophen (PERCOCET) 5-325 MG per tablet      • rivastigmine (EXELON) 6 MG capsule Take 6 mg by mouth 2 (Two) Times a Day.     • rivastigmine (EXELON) 6 MG capsule TAKE ONE CAPSULE BY  MOUTH TWICE A DAY     • sennosides-docusate (PERICOLACE) 8.6-50 MG per tablet Take 2 tablets by mouth Daily.     • spironolactone (ALDACTONE) 25 MG tablet Take 25 mg by mouth Every Morning.     • spironolactone (ALDACTONE) 25 MG tablet TAKE ONE TABLET BY MOUTH DAILY     • tacrolimus (PROGRAF) 1 MG capsule Take 1 mg by mouth 2 (Two) Times a Day. TAKES 8 AM AND 8 PM     • tacrolimus (PROGRAF) 1 MG capsule Take 1 mg by mouth.     • tamsulosin (FLOMAX) 0.4 MG capsule 24 hr capsule Take 0.4 mg by mouth Daily. TAKES @ 1200 NOON     • Testosterone Cypionate (DEPOTESTOTERONE CYPIONATE) 200 MG/ML injection      • albuterol (PROVENTIL HFA;VENTOLIN HFA) 108 (90 Base) MCG/ACT inhaler Inhale 2 puffs Every 4 (Four) Hours As Needed for Wheezing.     • albuterol sulfate  (90 Base) MCG/ACT inhaler Inhale 2 puffs.     • insulin aspart (NOVOLOG) 100 UNIT/ML injection SLIDING SCALE     • Insulin Disposable Pump (V-GO 30) kit Inject 1 Device under the skin.     • [DISCONTINUED] Cholecalciferol (Vitamin D3) 50 MCG (2000 UT) tablet Take  by mouth Daily.     • [DISCONTINUED] Potassium 99 MG tablet Take 2 tablets by mouth Every Evening. TAKES @@ 05:00 PM     • [DISCONTINUED] Potassium 99 MG tablet Take 99 mg by mouth.     • [DISCONTINUED] tacrolimus (PROGRAF) 0.5 MG capsule          Patient Active Problem List   Diagnosis   • Lumbar radiculopathy   • Spinal stenosis of lumbar region   • Spinal cord stimulator status   • Other chronic pain       Past Medical History:   Diagnosis Date   • Acid reflux    • Anxiety    • Asthma    • Chronic back pain    • Depression    • Hypertension    • Joint pain    • Low back pain    • MCI (mild cognitive impairment) with memory loss    • Myocardial infarct (CMS/HCC) 1991, 1994, 1995, 2002, 2005,   • Neuropathy in diabetes (CMS/[facility])    • Osteoarthritis    • Peripheral neuropathy    • Sleep apnea     CPAP   • Vitamin B 12 deficiency        Past Surgical History:   Procedure Laterality Date   •  "APPENDECTOMY     • CARDIAC CATHETERIZATION      MULTIPLE   • CARDIAC SURGERY  1995    CLOT REMOVED   • CHEST SURGERY  11/1995    REPAIR CHEST BONE SURGERY   • CORONARY ARTERY BYPASS GRAFT      5 VESSELS   • GALLBLADDER SURGERY     • HEART TRANSPLANT     • HEART TRANSPLANT  06/17/2008   • HEART TRANSPLANT  2008   • KNEE ARTHROPLASTY Left 11/2006   • KNEE ARTHROSCOPY Left 1994   • KNEE CARTILAGE SURGERY Right 10/17/2017   • KNEE SURGERY Bilateral    • PROSTATE SURGERY      REPAIR   • SHOULDER SURGERY Right    • SPINAL CORD STIMULATOR IMPLANT N/A 4/20/2018    Procedure: SPINAL CORD STIMULATOR INSERTION PHASE 2;  Surgeon: Stevo Brian MD;  Location: [facility];  Service: Pain Management   • TOTAL KNEE ARTHROPLASTY REVISION Left 10/30/2009       History reviewed. No pertinent family history.    Social History     Socioeconomic History   • Marital status:      Spouse name: Not on file   • Number of children: Not on file   • Years of education: Not on file   • Highest education level: Not on file   Tobacco Use   • Smoking status: Former Smoker   • Smokeless tobacco: Never Used   Vaping Use   • Vaping Use: Never used   Substance and Sexual Activity   • Alcohol use: No   • Drug use: No   • Sexual activity: Defer       -------       Review of Systems  No Fever, No Chills, No ear pain, No sinus pressure or drainage, No eye pain or drainage, No cough, No SOB, No chest tightness nor chest pain, no palpitations.          Vitals:    06/18/21 0812   BP: 142/98   BP Location: Left arm   Patient Position: Sitting   Pulse: 100   Resp: 16   Temp: 98.2 °F (36.8 °C)   TempSrc: Temporal   SpO2: 96%   Weight: 87.1 kg (192 lb)   Height: 177.8 cm (70\")         Objective   Physical Exam  VSS, NNR, NCAT, NMNA, NRD, AAOx3.  He has abnormal gait.  He has active motion in his back.  Tenderness of the back.  -------    Assessment & Plan:  - as noted above, the stated intervention is indicated  - Follow-up plan will be noted in the " operative report         having follow-up very soon to go over the results of the trial.      EMR Dragon/Transcription disclaimer:   Typed items in this encounter note may have been created by electronic transcription/translation software which converts spoken language to printed text. The electronic translation of spoken language may permit erroneous, or at times, nonsensical words or phrases to be inadvertently transcribed; Although I have reviewed the note for such errors, some may still exist.

## 2021-06-18 ENCOUNTER — HOSPITAL ENCOUNTER (OUTPATIENT)
Dept: GENERAL RADIOLOGY | Facility: SURGERY CENTER | Age: 76
Setting detail: HOSPITAL OUTPATIENT SURGERY
End: 2021-06-18

## 2021-06-18 ENCOUNTER — HOSPITAL ENCOUNTER (OUTPATIENT)
Facility: SURGERY CENTER | Age: 76
Setting detail: HOSPITAL OUTPATIENT SURGERY
Discharge: HOME | End: 2021-06-18
Attending: ANESTHESIOLOGY | Admitting: ANESTHESIOLOGY

## 2021-06-18 VITALS
TEMPERATURE: 98 F | SYSTOLIC BLOOD PRESSURE: 137 MMHG | BODY MASS INDEX: 27.49 KG/M2 | HEART RATE: 98 BPM | RESPIRATION RATE: 16 BRPM | OXYGEN SATURATION: 95 % | WEIGHT: 192 LBS | HEIGHT: 70 IN | DIASTOLIC BLOOD PRESSURE: 85 MMHG

## 2021-06-18 DIAGNOSIS — M54.16 LUMBAR RADICULOPATHY: ICD-10-CM

## 2021-06-18 DIAGNOSIS — Z41.9 SURGERY, ELECTIVE: ICD-10-CM

## 2021-06-18 DIAGNOSIS — M48.061 SPINAL STENOSIS OF LUMBAR REGION, UNSPECIFIED WHETHER NEUROGENIC CLAUDICATION PRESENT: ICD-10-CM

## 2021-06-18 LAB — GLUCOSE BLDC GLUCOMTR-MCNC: 141 MG/DL (ref 70–130)

## 2021-06-18 PROCEDURE — 62323 NJX INTERLAMINAR LMBR/SAC: CPT | Performed by: ANESTHESIOLOGY

## 2021-06-18 PROCEDURE — 25010000002 MIDAZOLAM PER 1 MG: Performed by: ANESTHESIOLOGY

## 2021-06-18 PROCEDURE — 3E0R3BZ INTRODUCTION OF ANESTHETIC AGENT INTO SPINAL CANAL, PERCUTANEOUS APPROACH: ICD-10-PCS | Performed by: ANESTHESIOLOGY

## 2021-06-18 PROCEDURE — 3E0R33Z INTRODUCTION OF ANTI-INFLAMMATORY INTO SPINAL CANAL, PERCUTANEOUS APPROACH: ICD-10-PCS | Performed by: ANESTHESIOLOGY

## 2021-06-18 PROCEDURE — 25010000003 LIDOCAINE 1 % SOLUTION: Performed by: ANESTHESIOLOGY

## 2021-06-18 PROCEDURE — 0 IOHEXOL 300 MG/ML SOLUTION: Performed by: ANESTHESIOLOGY

## 2021-06-18 RX ORDER — SODIUM CHLORIDE 0.9 % (FLUSH) 0.9 %
10 SYRINGE (ML) INJECTION EVERY 12 HOURS SCHEDULED
Status: DISCONTINUED | OUTPATIENT
Start: 2021-06-18 | End: 2021-06-18 | Stop reason: HOSPADM

## 2021-06-18 RX ORDER — SODIUM CHLORIDE 0.9 % (FLUSH) 0.9 %
10 SYRINGE (ML) INJECTION AS NEEDED
Status: DISCONTINUED | OUTPATIENT
Start: 2021-06-18 | End: 2021-06-18 | Stop reason: HOSPADM

## 2021-06-18 RX ORDER — SODIUM CHLORIDE 9 MG/ML
INJECTION, SOLUTION INTRAVENOUS CONTINUOUS PRN
Status: COMPLETED | OUTPATIENT
Start: 2021-06-18 | End: 2021-06-18

## 2021-06-18 RX ORDER — SODIUM CHLORIDE, SODIUM LACTATE, POTASSIUM CHLORIDE, CALCIUM CHLORIDE 600; 310; 30; 20 MG/100ML; MG/100ML; MG/100ML; MG/100ML
1000 INJECTION, SOLUTION INTRAVENOUS CONTINUOUS
Status: DISCONTINUED | OUTPATIENT
Start: 2021-06-18 | End: 2021-06-18 | Stop reason: HOSPADM

## 2021-06-18 RX ORDER — LIDOCAINE HYDROCHLORIDE 10 MG/ML
INJECTION, SOLUTION INFILTRATION; PERINEURAL AS NEEDED
Status: DISCONTINUED | OUTPATIENT
Start: 2021-06-18 | End: 2021-06-18 | Stop reason: HOSPADM

## 2021-06-18 RX ORDER — MIDAZOLAM HYDROCHLORIDE 1 MG/ML
INJECTION INTRAMUSCULAR; INTRAVENOUS AS NEEDED
Status: DISCONTINUED | OUTPATIENT
Start: 2021-06-18 | End: 2021-06-18 | Stop reason: HOSPADM

## 2021-06-18 RX ADMIN — SODIUM CHLORIDE, POTASSIUM CHLORIDE, SODIUM LACTATE AND CALCIUM CHLORIDE 1000 ML: 600; 310; 30; 20 INJECTION, SOLUTION INTRAVENOUS at 08:24

## 2021-06-18 NOTE — DISCHARGE INSTRUCTIONS
[facility] - Post-procedure Instructions          --  While there are no absolute restrictions, it is recommended that you do not perform strenuous activity today. In the morning, you may resume your level of activity as before your block.    --  If you have a band-aid at your injection site, please remove it later today. Observe the area for any redness, swelling, pus-like drainage, or a temperature over 101°. If any of these symptoms occur, please call your doctor at [phone]. If after office hours, leave a message and the on-call provider will return your call.    --  Ice may be applied to your injection site. It is recommended you avoid direct heat (heating pad; hot tub) for 1-2 days.    --  Call [facility] at [phone] if you experience persistent headache, persistent bleeding from the injection site, or severe pain not relieved by heat or oral medication.    --  Do not make important decisions today.    --  Due to the effects of the block and/or the I.V. Sedation, DO NOT drive or operate hazardous machinery for 12 hours.    --  Do not drink alcohol for 12 hours.    -- You may return to work tomorrow, or as directed by your referring doctor.    --  Occasionally you may notice a slight increase in your pain after the procedure. This should start to improve within the next 24-48 hours. Radiofrequency ablation procedure pain may last 3-4 weeks.    --  It may take as long as 3-4 days before you notice a gradual improvement in your pain and/or other symptoms.    -- You may continue to take your prescribed pain medication as needed.    --  Some normal possible side effects of steroid use could include fluid retention, increased blood sugar, dull headache, increased sweating, increased appetite, mood swings and flushing.    --  Diabetics are recommended to watch their blood glucose level closely for 24-48 hours after the injection.    --  Must stay in PACU for 20 min upon arrival and  prove no leg weakness before being discharged.    --  IN THE EVENT OF A LIFE THREATENING EMERGENCY, (CHEST PAIN, BREATHING DIFFICULTIES, PARALYSIS…) YOU SHOULD GO TO YOUR NEAREST EMERGENCY ROOM.    --  You should be contacted by our office within 2-3 days to schedule follow up or next appointment date.  If not contacted within 7 days, please call the office at [phone]      Ziconotide injection  What is this medicine?  ZICONOTIDE (zye LYRIC oh tide)is a non-narcotic pain reliever. It is used to treat severe chronic pain.  This medicine may be used for other purposes; ask your health care provider or pharmacist if you have questions.  COMMON BRAND NAME(S): Siennaalherminio  What should I tell my health care provider before I take this medicine?  They need to know if you have any of these conditions:  · blood disease, bleeding disorders or problems, hemophilia or aneurysm  · depression, psychosis, thoughts of suicide or past suicide attempts, or other mental health problems  · infection  · spinal canal blockage  · an unusual or allergic reaction to ziconotide, other medicines, foods, dyes, or preservatives  · pregnant or trying to get pregnant  · breast-feeding  How should I use this medicine?  This medicine is for injection into the space around the spinal cord. It is given by a special implanted pump under the supervision of a doctor or health care professional. You will be taught how to use your pump.  Talk to your pediatrician regarding the use of this medicine in children. Special care may be needed.  Overdosage: If you think you have taken too much of this medicine contact a poison control center or emergency room at once.  NOTE: This medicine is only for you. Do not share this medicine with others.  What if I miss a dose?  This does not apply. Your healthcare professional will schedule refills of your medication pump as needed. It is important not to miss your appointments. Call your doctor or health care  professional if you are unable to keep an appointment.  What may interact with this medicine?  · alcohol  · other medicines that cause drowsiness  This list may not describe all possible interactions. Give your health care provider a list of all the medicines, herbs, non-prescription drugs, or dietary supplements you use. Also tell them if you smoke, drink alcohol, or use illegal drugs. Some items may interact with your medicine.  What should I watch for while using this medicine?  Tell your doctor or health care professional if your pain does not go away, if it gets worse, or if you have new or a different type of pain. You may need blood work done while you are taking this medicine.  You may get drowsy or dizzy. Do not drive, use machinery, or do anything that needs mental alertness until you know how this medicine affects you. Do not stand or sit up quickly, especially if you are an older patient. This reduces the risk of dizzy or fainting spells. Alcohol may interfere with the effect of this medicine. Avoid alcoholic drinks.  What side effects may I notice from receiving this medicine?  Side effects that you should report to your doctor or health care professional as soon as possible:  · allergic reactions like skin rash, itching or hives, swelling of the face, lips, or tongue  · changes in vision  · confusion, agitation, changes in mental ability  · dark urine  · feeling faint or lightheaded, falls  · fever, headache and/or stiff neck  · hallucinations  · memory problems  · muscle pain or weakness  · nausea, vomiting  · problems with balance, talking, walking  · seizures  · suicidal thoughts or other mood changes  · trouble passing urine or change in the amount of urine  · unusually weak or tired  Side effects that usually do not require medical attention (report to your doctor or health care professional if they continue or are bothersome):  · clumsiness, unsteadiness  · diarrhea  · dizziness,  drowsiness  · headache  · loss of appetite  · nervous or restless  · trouble sleeping  This list may not describe all possible side effects. Call your doctor for medical advice about side effects. You may report side effects to FDA at 6-083-HIZ-2014.  Where should I keep my medicine?  This drug is given in a hospital or clinic and will not be stored at home.  NOTE: This sheet is a summary. It may not cover all possible information. If you have questions about this medicine, talk to your doctor, pharmacist, or health care provider.  © 2021 [employer] (2009-09-15 13:58:14)

## 2021-06-18 NOTE — OP NOTE
Intrathecal Injection of Prialt (ziconotide) - Single Shot Spinal as a Trial for IDDS  [facility]    PREOPERATIVE DIAGNOSIS:  Lumbar radiculopathy, spinal stenosis    POSTOPERATIVE DIAGNOSIS:  Same as preop diagnosis    PROCEDURE:   Intrathecal Ziconotide Trial, as a single shot spinal, with fluoroscopic guidance, in the L1-2 interspace.       PRE-PROCEDURE DISCUSSION WITH PATIENT:    Risks and complications were discussed with the patient prior to starting the procedure and informed consent was obtained.  We discussed various topics including but not limited to bleeding, infection, injury, nausea, vomiting, hypotension, hallucination, mental status changes, nerve injury, paralysis, coma, death, postprocedural painful flare-up, postprocedural site soreness, and a lack of pain relief.        SURGEON:  Stevo Brian MD    REASON FOR PROCEDURE:      The trial is necessary to determine if this patient may be a responder to chronic intrathecal administration of ziconotide for chronic, intractable pain, and also to evaluate whether this patient may or may not be predisposed to intolerable side effects of the therapy    SEDATION:  Versed 2mg IV    DOSE OF ZICONOTIDE:   5 mcg    DESCRIPTON OF PROCEDURE:  After obtaining informed consent, an I.V. was started in the preoperative area. The patient taken to the operating room and was placed in the prone position.  All pressure points were well padded.  EKG, blood pressure, and pulse oximeter were monitored.  The Thoracolumbar, upper lumbar and mid-lumbar was identified and marked.  The appropriate area was prepped with Chloraprep and draped in a sterile fashion.   Strict sterile technique was observed throughout.    The area over the aforementioned interspace was anesthetized with 1-2ml of 1% lidocaine, and then a spinal needle was advanced under sequential PA fluoroscopic image guidance into the interspace, and advanced through the epidural space, and  the dura was punctured.  Positive CSF flow was noted in the needle.  Contrast dye was injected to confirm intrathecal administration.  Needle depth was confirmed appropriate on lateral fluoroscopic view.      Then, the aforementioned dose of ziconotide was injected into the intrathecal space.  The needle was removed intact.  Vital signs were stable throughout.        ESTIMATED BLOOD LOSS:  <5 mL  SPECIMENS:  none    COMPLICATIONS:   No complications were noted., There was no indication of vascular uptake on live injection of contrast dye., Aspiration was positive for CSF flow.   and The patient did not have any signs of postprocedure numbness nor weakness.    TOLERANCE & DISCHARGE CONDITION:    The patient tolerated the procedure well.  The patient was transported to the recovery area without difficulties.  The patient was observed for an extended period of time in the recovery room, was examined multiple times, and was encouraged to walk and engage in activity frequently to assess the immediate pain relief.  The patient was discharged to home under the care of family, after this extended PACU stay, and after ensuring a lack of intolerable side effects from the injection, in stable and satisfactory condition.    PLAN OF CARE:  1. The patient was given our standard instruction sheet.  2. The patient will Return to clinic 1 wk.  3. The patient will resume all medications as per the medication reconciliation sheet.

## 2021-06-21 ENCOUNTER — DOCUMENTATION (OUTPATIENT)
Dept: PAIN MEDICINE | Facility: CLINIC | Age: 76
End: 2021-06-21

## 2021-06-21 ENCOUNTER — TELEPHONE (OUTPATIENT)
Dept: PAIN MEDICINE | Facility: CLINIC | Age: 76
End: 2021-06-21

## 2021-06-21 NOTE — TELEPHONE ENCOUNTER
Called and spoke with pt. Pt sts he ended up in the hospital due to severe SE from prialt. Pt sts he had double vision, dizziness, N&V, swollen prostate. Pt sts prialt improved his pain, but SE were unbearable. Pt sts he still has catheter and is waiting for a call back from urologist Dr. Schafer office for an appt today to hopefully remove catheter. I asked him to call us back with appt time. He said he will call back.

## 2021-06-21 NOTE — TELEPHONE ENCOUNTER
Pt returned my call. He std his catheter is out. He std someone from Dr. Schafer office gave him instructions over the phone to remove, he didn't have an appt in office. He std all SE have resolved and his pain has improved. He will be here tomorrow.

## 2021-06-21 NOTE — TELEPHONE ENCOUNTER
Please call this patient and check to see how he is doing. I received a call from him over the weekend due to side effects from his prialt trial. Please also ask if he has been able to schedule an appt with his urologist as I did check with our  and our office will not be able to remove his catheter for him. Thanks

## 2021-06-21 NOTE — PROGRESS NOTES
Patient contacted the service on Saturday, 6/19/21 at approximately 1030. Patient notified me that he was treated in the ED at [facility] following his Prialt trial on 6/18/2021.  He states he went to the ED due to inability to void, blurred vision, and being unsteady while walking.  He states he was sent home with a F/C in place. He does report that his blurred vision and unsteadiness have continued to improve. Patient reassured that these are known side effects of Prialt and they should wear off shortly.  Patient asks if our office can remove his F/C at his appointment on Tuesday.  Patient states he was told to see his urologist by the ED.  Discussed with patient that [facility] Catheters are not something that our office manages. He asks if we have nurses on staff. Explained that we do have nurses, but that he needs to follow up with his urologist to have the F/C removed per the recommendation of the ED.  Patient instructed to notify the service and to proceed to the ED if he develops any worsening leg pain, weakness, or numbness.  Patient states understanding.

## 2021-06-22 ENCOUNTER — OFFICE VISIT (OUTPATIENT)
Dept: PAIN MEDICINE | Facility: CLINIC | Age: 76
End: 2021-06-22

## 2021-06-22 ENCOUNTER — PREP FOR SURGERY (OUTPATIENT)
Dept: SURGERY | Facility: SURGERY CENTER | Age: 76
End: 2021-06-22

## 2021-06-22 VITALS
HEART RATE: 96 BPM | RESPIRATION RATE: 18 BRPM | TEMPERATURE: 96.8 F | WEIGHT: 198.4 LBS | HEIGHT: 70 IN | SYSTOLIC BLOOD PRESSURE: 125 MMHG | DIASTOLIC BLOOD PRESSURE: 82 MMHG | OXYGEN SATURATION: 96 % | BODY MASS INDEX: 28.4 KG/M2

## 2021-06-22 DIAGNOSIS — M48.061 SPINAL STENOSIS OF LUMBAR REGION, UNSPECIFIED WHETHER NEUROGENIC CLAUDICATION PRESENT: ICD-10-CM

## 2021-06-22 DIAGNOSIS — M54.16 LUMBAR RADICULOPATHY: ICD-10-CM

## 2021-06-22 DIAGNOSIS — M48.061 SPINAL STENOSIS OF LUMBAR REGION, UNSPECIFIED WHETHER NEUROGENIC CLAUDICATION PRESENT: Primary | ICD-10-CM

## 2021-06-22 DIAGNOSIS — G89.29 OTHER CHRONIC PAIN: Primary | ICD-10-CM

## 2021-06-22 DIAGNOSIS — G89.29 OTHER CHRONIC PAIN: ICD-10-CM

## 2021-06-22 PROCEDURE — 99215 OFFICE O/P EST HI 40 MIN: CPT | Performed by: NURSE PRACTITIONER

## 2021-06-22 RX ORDER — BUSPIRONE HYDROCHLORIDE 15 MG/1
10 TABLET ORAL
COMMUNITY
Start: 2021-06-17

## 2021-06-22 RX ORDER — SODIUM CHLORIDE 0.9 % (FLUSH) 0.9 %
10 SYRINGE (ML) INJECTION EVERY 12 HOURS SCHEDULED
Status: CANCELLED | OUTPATIENT
Start: 2021-06-22

## 2021-06-22 RX ORDER — SODIUM CHLORIDE 0.9 % (FLUSH) 0.9 %
10 SYRINGE (ML) INJECTION AS NEEDED
Status: CANCELLED | OUTPATIENT
Start: 2021-06-22

## 2021-06-22 RX ORDER — AMLODIPINE BESYLATE 10 MG/1
TABLET ORAL
COMMUNITY
Start: 2021-03-25

## 2021-06-22 NOTE — PROGRESS NOTES
"CHIEF COMPLAINT  Post-op follow-up.     Subjective   Saurav Cotto . is a 76 y.o. male  who presents to the office for follow-up of procedure.  He completed a PAIN PUMP TRIAL    on  6/18/2021 performed by Dr. Brian for management of back pain. Patient reports 100% relief from the procedure.  HOwever, he had some untoward side effects.  Later that evening after the Prialt trial, the patient presented to ER (closer to home, do not have records), with urinary retention and vision changes. He states the medication wore off approximately 36 hours after the procedure and all of the symptoms disappeared, as well as the pain relief.     Complains of pain in his back. Today his pain is 7/10VAS. Describes the pain as continuous. Pain worsens with walking, prolonged standing. 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 He continues with pain medication from his PCP. Adl's by self. Denies any bowel or bladder changes.    status post [employer] SCS implant on 4/20/2018 with Dr. Brian. \"My pain in my back has gotten so much worse that my stimulator is not helping as much as it used to.\" He asks about removing this as well.      Previously had epidurals with no lasting relief.    Completed lumbar Ct, xray-T&L, Reprogramming with BS, Psychological evaluation and [employer] Prial pump trial.  Also had Neurosurgical evaluation and nothing to offer.    He reports previous issues with enlarged prostate. Has a urologist, who walked patient through how to remove catheter. He is due for follow-up. Has PSA checked every 2 months.     Patient remained masked during entire encounter. No cough present. I donned a mask and eye protection throughout entire visit. Prior to donning mask and eye protection, hand hygiene was performed, as well as when it was doffed.  I was closer than 6 feet, but not for an extended period of time. No " obvious exposure to any bodily fluids.  Presents with wife who helps with history. Remained masked as well.     Back Pain  This is a chronic problem. The current episode started more than 1 year ago. The problem has been gradually worsening since onset. The pain is at a severity of 7/10. The pain is moderate. The pain is worse during the day. Pertinent negatives include no bladder incontinence, bowel incontinence, chest pain, dysuria, fever, headaches, numbness or weakness. Risk factors include lack of exercise and sedentary lifestyle. He has tried muscle relaxant for the symptoms.            PEG Assessment   What number best describes your pain on average in the past week?7  What number best describes how, during the past week, pain has interfered with your enjoyment of life?10  What number best describes how, during the past week, pain has interfered with your general activity?  7      The following portions of the patient's history were reviewed and updated as appropriate: allergies, current medications, past family history, past medical history, past social history, past surgical history and problem list.    Review of Systems   Constitutional: Negative for fatigue and fever.   HENT: Negative for congestion.    Eyes: Negative for visual disturbance.   Respiratory: Negative for cough, shortness of breath and wheezing.    Cardiovascular: Negative.  Negative for chest pain.   Gastrointestinal: Negative for bowel incontinence, constipation and diarrhea.   Genitourinary: Negative for bladder incontinence, difficulty urinating and dysuria.   Musculoskeletal: Positive for back pain.   Neurological: Negative for weakness, numbness and headaches.   Psychiatric/Behavioral: Negative for sleep disturbance and suicidal ideas. The patient is not nervous/anxious.      I have reviewed and confirmed the accuracy of the ROS as documented by the MA/RADHA/RN Penelope Fernandes, APRN       Vitals:    06/22/21 1253   BP: 125/82   Pulse:  "96   Resp: 18   Temp: 96.8 °F (36 °C)   SpO2: 96%   Weight: 90 kg (198 lb 6.4 oz)   Height: 177.8 cm (70\")   PainSc:   7   PainLoc: Back         Objective   Physical Exam  Vitals and nursing note reviewed.   Constitutional:       Appearance: Normal appearance. He is well-developed.   HENT:      Head: Normocephalic and atraumatic.   Musculoskeletal:      Lumbar back: Tenderness and bony tenderness present. Decreased range of motion.   Skin:     General: Skin is warm and dry.   Neurological:      Mental Status: He is alert.      Gait: Gait abnormal.   Psychiatric:         Speech: Speech normal.         Behavior: Behavior normal. Behavior is cooperative.         Thought Content: Thought content normal.         Judgment: Judgment normal.         Assessment/Plan   Diagnoses and all orders for this visit:    1. Other chronic pain (Primary)    2. Spinal stenosis of lumbar region, unspecified whether neurogenic claudication present    3. Lumbar radiculopathy      --- Discussion with Dr Rodriguez. Plan to proceed with intrathecal pump implant. Plan will be to use Prialt for the pump, but to start at an extremely low dose. Reviewed this at length with patient and wife. We did discuss that if he has side effects from Prialt, could consider transition to another medication(morphine, Dilaudid) at Dr rodriguez's prescription. Reviewed that Trial had 5 mcg injection given, reviewed normal daily dosing of our patient population is much lower than that over a 24 hour period.  Reviewed that per Dr Rodriguez, the plan would be to start low and increase slowly as tolerated. Reviewed monitoring for side effects. Also, they did ask about stopping oral opioids. Reviewed as we increase Prialt(with no side effects) and pain becomes better controlled, goal is to wean down  oral opioids. Patient and wife verbalized understanding and wish to proceed with pump implant.   --- Reviewed the procedure at length with the patient.  Included in the review " was expectations, complications, risk and benefits.The procedure was described in detail and the risks, benefits and alternatives were discussed with the patient (including but not limited to: bleeding, infection, nerve damage, worsening of pain, inability to perform injection, paralysis, seizures, and death) who agreed to proceed.  Discussed the potential for sedation if warranted/wanted.  The procedure will plan to be performed at [facility] with fluoroscopic guidance(unless ultrasound is indicated) and could potentially have steroids and contrast dye used. Questions were answered and in a way the patient could understand.  Patient verbalized understanding and wishes to proceed.  This intervention will be ordered.  Discussed with patient that all procedures are part of a multimodal plan of care and include either formal PT or a home exercise program.  Patient has no evidence of coagulopathy or current infection.  --- Follow-up after procedure or sooner if needed.    --------------  Education about Intrathecal Pump Trialing and Therapy:    IT pump Therapy involves the placing a catheter into the intrathecal space, which holds the spinal fluid, and in which the spinal cord resides, and using the catheter to infuse medication directly into the spinal fluid bathing the spinal cord.   This therapy is often a reasonable option for patients with chronic painful conditions that are not amenable to more basic interventional strategies, and also often for patients who wish to have better pain control and ability to meet functional goals while not having to rely on oral pain medications.  This therapy is also often used for patients with cancer or other terminal illnesses.      Prior to starting this advanced neuromodulation technique, it is oftentimes quite reasonable to engage in a trial to determine whether or not a chosen medication could be effective in providing pain relief.  A trial could consist  "of a single shot of medication as a spinal injection and observation for several hours in the recovery room; a trial could also consist of placement of a temporary catheter and inpatient admission to the hospital for 2 to 3 days for a more comprehensive trial.      Implant of the device is frequently an outpatient procedure; sometimes a 23-hour inpatient observation postoperatively is needed.  The surgery to implant usually takes 60-90 minutes, and two incisions are made:  One to implant and secure the catheter that enters the spine, and another to create the subcutaneous pocket in which the pump is implanted.      General surgical risks of both include but are not limited to bleeding & infection & injury & risk of neural injury or paralysis or coma or death, and risk of failure of the electronic device, and risk of worsening of clinical picture.  Intrathecal pump therapy also carries risks of increasing pain as well as withdrawal symptoms depending on drug choice if the system fails.  Risks of granuloma in the intrathecal space may make dosing ineffective and may require a cessation of drug infusion.  Risk of catheter fracture is not common, but if this was to happen then drug could spill into the tissues and could cause irritation as well as risk of overdose.  Risk of drug side effects specific to each drug and risk of overdose and death.    After implant, multiple and frequent office visits may be required as we adjust the medication dose to a proper titration.  Intrathecal pump management over the long term includes routine office visits every 4 to 12 weeks, depending on drug and dose requirements, for interrogation of the system as well as refill of the reservoir.  Risks include but are not limited to bleeding, infection, and risk of \"pocket fill\" in which the drug is not properly instilled into the pump, which can cause overdose and death, depending on the drug being used.      Different medications can be " utilized in the intrathecal pump.  In general, I feel that the most reasonable medication is usually the nonnarcotic neural blocker Prialt (ziconotide).  We tend to have the most success with this medication.  Sometimes opioids are chosen instead, and at other times depending on the situation the drug choice may be a local anesthetic agent, or a combination of local anesthetic and opioid.  On some occasions we also offer the option for use of a remote control so that the patient can administer their own doses of medication at times of their choosing.    --------------    --------------  Education about Prialt (ziconotide) therapy:    Ziconotide is a prescription pain medication, with the brand name of Prialt.  This medication is administered through an intrathecal pain pump, which infuses the medication into the spinal fluid that surrounds the spinal cord.  This medication is made in a lab today, but was discovered as an ingredient in the venom of the [employer] Snail.      Ziconotide is a non-narcotic nerve blocking agent which acts to interrupt pain signals that are traveling through the spinal cord to the brain.  (It blocks pain by blocking the release of neural transmitters that transmit painful signals.)   Intrathecal pain pump implants and ziconotide are frequently used in patients with chronic pain who are in need of advanced options to manage their chronic pain which has been difficult to treat with the more basic modalities.      Besides its significant pain relieving effects which work for many if not most patients, ziconotide also has other advantages.  It is not a narcotic, and therefore there patients are not prone to the development of tolerance or addiction.  Patients are not at risk for death from overdose.    No prescription medication is without risks.  Ziconotide is contraindicated in patients with a history of psychosis, bipolar disorder, severe depression, or other uncontrolled psychiatric disorders.    More common side effects could include dizziness, sleepiness, headache, nausea, and itching.  Less common side effects could include depression, hallucinations, meningitis, and seizures.  Also, there are risks to implantation of an intrathecal pump.      To avoid side effects, the medication is adjusted slowly to find doses which provide significant relief.  If side effects arise, the dose is decreased.  Changes in dose require reprogramming and interrogation of the pain pump.  Therefore, as a proper dose is being found, frequent office visits may be necessary.  Most patients find that when their proper dose is achieved, they continue at that dose and do not develop tolerance.     All drugs have side effects, and the safety profile and incidence of dangerous side effects from Prialt therapy is usually superior to the other options.  In our opinion, there must be a compelling reason to choose other options for intrathecal pain medication therapy over Prialt.    --------------       WILDER REPORT  As the clinician, I personally reviewed the WILDER from 6-22-21 while the patient was in the office today.    I spent 51 minutes caring for patient on this date of service. This time includes time spent by me in the following activities:reviewing tests, obtaining and/or reviewing a separately obtained history, performing a medically appropriate examination and/or evaluation , counseling and educating the patient/family/caregiver, ordering medications, tests, or procedures and documenting information in the medical record          EMR Dragon/Transcription disclaimer:   Much of this encounter note is an electronic transcription/translation of spoken language to printed text. The electronic translation of spoken language may permit erroneous, or at times, nonsensical words or phrases to be inadvertently transcribed; Although I have reviewed the note for such errors, some may still exist.

## 2021-06-23 ENCOUNTER — APPOINTMENT (OUTPATIENT)
Dept: LAB | Facility: SURGERY CENTER | Age: 76
End: 2021-06-23

## 2021-06-25 ENCOUNTER — TRANSCRIBE ORDERS (OUTPATIENT)
Dept: SURGERY | Facility: SURGERY CENTER | Age: 76
End: 2021-06-25

## 2021-06-25 ENCOUNTER — APPOINTMENT (OUTPATIENT)
Dept: GENERAL RADIOLOGY | Facility: SURGERY CENTER | Age: 76
End: 2021-06-25

## 2021-06-25 DIAGNOSIS — Z41.9 SURGERY, ELECTIVE: Primary | ICD-10-CM

## 2021-07-01 DIAGNOSIS — Z01.818 PRE-OP TESTING: Primary | ICD-10-CM

## 2021-07-06 ENCOUNTER — TRANSCRIBE ORDERS (OUTPATIENT)
Dept: LAB | Facility: SURGERY CENTER | Age: 76
End: 2021-07-06

## 2021-07-06 DIAGNOSIS — Z01.818 OTHER SPECIFIED PRE-OPERATIVE EXAMINATION: Primary | ICD-10-CM

## 2021-07-13 ENCOUNTER — TELEPHONE (OUTPATIENT)
Dept: PAIN MEDICINE | Facility: CLINIC | Age: 76
End: 2021-07-13

## 2021-07-13 NOTE — TELEPHONE ENCOUNTER
Patient is coming out here tomorrow for covid testing so he is going to just have the labs performed here. Can you make sure they are in our system so that he can have what he needs done here at [facility] for his surgery on Friday.

## 2021-07-13 NOTE — TELEPHONE ENCOUNTER
Hub staff attempted to follow warm transfer process and was unsuccessful     Caller: PATIENT    Relationship to patient:     Best call back number:[phone]    Patient is needing: PATIENT HAS A PROCEDURE SCHD FOR 7/16/21 AND HAS SOME QUES ON WHEN AND WHERE TO HAVE LABS AND COVID TEST   PLEASE CALL ASAP

## 2021-07-14 ENCOUNTER — LAB (OUTPATIENT)
Dept: LAB | Facility: HOSPITAL | Age: 76
End: 2021-07-14

## 2021-07-14 ENCOUNTER — HOSPITAL ENCOUNTER (OUTPATIENT)
Dept: CARDIOLOGY | Facility: HOSPITAL | Age: 76
Discharge: HOME | End: 2021-07-14

## 2021-07-14 ENCOUNTER — HOSPITAL ENCOUNTER (OUTPATIENT)
Dept: GENERAL RADIOLOGY | Facility: HOSPITAL | Age: 76
Discharge: HOME | End: 2021-07-14

## 2021-07-14 ENCOUNTER — LAB (OUTPATIENT)
Dept: LAB | Facility: SURGERY CENTER | Age: 76
End: 2021-07-14

## 2021-07-14 DIAGNOSIS — E08.40 DIABETES MELLITUS DUE TO UNDERLYING CONDITION WITH DIABETIC NEUROPATHY, UNSPECIFIED WHETHER LONG TERM INSULIN USE (HCC): ICD-10-CM

## 2021-07-14 DIAGNOSIS — Z01.818 PRE-OP TESTING: Primary | ICD-10-CM

## 2021-07-14 DIAGNOSIS — Z01.818 PRE-OP TESTING: ICD-10-CM

## 2021-07-14 DIAGNOSIS — Z01.818 OTHER SPECIFIED PRE-OPERATIVE EXAMINATION: ICD-10-CM

## 2021-07-14 LAB
ANION GAP SERPL CALCULATED.3IONS-SCNC: 6.2 MMOL/L (ref 5–15)
BASOPHILS # BLD AUTO: 0.04 10*3/MM3 (ref 0–0.2)
BASOPHILS NFR BLD AUTO: 0.5 % (ref 0–1.5)
BUN SERPL-MCNC: 27 MG/DL (ref 8–23)
BUN/CREAT SERPL: 26 (ref 7–25)
CALCIUM SPEC-SCNC: 9.9 MG/DL (ref 8.6–10.5)
CHLORIDE SERPL-SCNC: 98 MMOL/L (ref 98–107)
CO2 SERPL-SCNC: 28.8 MMOL/L (ref 22–29)
CREAT SERPL-MCNC: 1.04 MG/DL (ref 0.76–1.27)
DEPRECATED RDW RBC AUTO: 51.7 FL (ref 37–54)
EOSINOPHIL # BLD AUTO: 0.12 10*3/MM3 (ref 0–0.4)
EOSINOPHIL NFR BLD AUTO: 1.5 % (ref 0.3–6.2)
ERYTHROCYTE [DISTWIDTH] IN BLOOD BY AUTOMATED COUNT: 14.9 % (ref 12.3–15.4)
GFR SERPL CREATININE-BSD FRML MDRD: 70 ML/MIN/1.73
GLUCOSE SERPL-MCNC: 115 MG/DL (ref 65–99)
HBA1C MFR BLD: 6.7 % (ref 4.8–5.6)
HCT VFR BLD AUTO: 40.8 % (ref 37.5–51)
HGB BLD-MCNC: 13.2 G/DL (ref 13–17.7)
IMM GRANULOCYTES # BLD AUTO: 0.06 10*3/MM3 (ref 0–0.05)
IMM GRANULOCYTES NFR BLD AUTO: 0.7 % (ref 0–0.5)
LYMPHOCYTES # BLD AUTO: 0.85 10*3/MM3 (ref 0.7–3.1)
LYMPHOCYTES NFR BLD AUTO: 10.5 % (ref 19.6–45.3)
MCH RBC QN AUTO: 30.8 PG (ref 26.6–33)
MCHC RBC AUTO-ENTMCNC: 32.4 G/DL (ref 31.5–35.7)
MCV RBC AUTO: 95.1 FL (ref 79–97)
MONOCYTES # BLD AUTO: 1.3 10*3/MM3 (ref 0.1–0.9)
MONOCYTES NFR BLD AUTO: 16.1 % (ref 5–12)
MRSA SPEC QL CULT: NORMAL
NEUTROPHILS NFR BLD AUTO: 5.7 10*3/MM3 (ref 1.7–7)
NEUTROPHILS NFR BLD AUTO: 70.7 % (ref 42.7–76)
NRBC BLD AUTO-RTO: 0 /100 WBC (ref 0–0.2)
PLATELET # BLD AUTO: 321 10*3/MM3 (ref 140–450)
PMV BLD AUTO: 8.4 FL (ref 6–12)
POTASSIUM SERPL-SCNC: 5 MMOL/L (ref 3.5–5.2)
QT INTERVAL: 333 MS
RBC # BLD AUTO: 4.29 10*6/MM3 (ref 4.14–5.8)
SARS-COV-2 ORF1AB RESP QL NAA+PROBE: NOT DETECTED
SODIUM SERPL-SCNC: 133 MMOL/L (ref 136–145)
WBC # BLD AUTO: 8.07 10*3/MM3 (ref 3.4–10.8)

## 2021-07-14 PROCEDURE — U0005 INFEC AGEN DETEC AMPLI PROBE: HCPCS | Performed by: SURGERY

## 2021-07-14 PROCEDURE — 83036 HEMOGLOBIN GLYCOSYLATED A1C: CPT

## 2021-07-14 PROCEDURE — 36415 COLL VENOUS BLD VENIPUNCTURE: CPT

## 2021-07-14 PROCEDURE — 71046 X-RAY EXAM CHEST 2 VIEWS: CPT

## 2021-07-14 PROCEDURE — C9803 HOPD COVID-19 SPEC COLLECT: HCPCS

## 2021-07-14 PROCEDURE — 85025 COMPLETE CBC W/AUTO DIFF WBC: CPT

## 2021-07-14 PROCEDURE — 80048 BASIC METABOLIC PNL TOTAL CA: CPT

## 2021-07-14 PROCEDURE — 87081 CULTURE SCREEN ONLY: CPT

## 2021-07-14 PROCEDURE — 93005 ELECTROCARDIOGRAM TRACING: CPT | Performed by: ANESTHESIOLOGY

## 2021-07-14 PROCEDURE — U0004 COV-19 TEST NON-CDC HGH THRU: HCPCS | Performed by: SURGERY

## 2021-07-14 PROCEDURE — 93010 ELECTROCARDIOGRAM REPORT: CPT | Performed by: INTERNAL MEDICINE

## 2021-07-14 NOTE — H&P (VIEW-ONLY)
-------    The following portions of the patient's history were reviewed and updated as appropriate: allergies, current medications, past family history, past medical history, past social history, past surgical history and problem list.    Allergies   Allergen Reactions   • Coreg [Carvedilol] Hives   • Singulair [Montelukast Sodium] Other (See Comments)     weakness   • Theophyllines Other (See Comments)     weakness         Current Outpatient Medications:   •  albuterol (PROVENTIL HFA;VENTOLIN HFA) 108 (90 Base) MCG/ACT inhaler, Inhale 2 puffs Every 4 (Four) Hours As Needed for Wheezing., Disp: , Rfl:   •  amLODIPine (NORVASC) 10 MG tablet, , Disp: , Rfl:   •  aspirin 81 MG EC tablet, Take 81 mg by mouth. HOLD PRIOR TO SURGERY, Disp: , Rfl:   •  atorvastatin (LIPITOR) 10 MG tablet, , Disp: , Rfl:   •  baclofen (LIORESAL) 10 MG tablet, Take 10 mg by mouth 3 (Three) Times a Day., Disp: , Rfl:   •  busPIRone (BUSPAR) 15 MG tablet, , Disp: , Rfl:   •  chlorthalidone (HYGROTON) 25 MG tablet, Take 25 mg by mouth Every Morning., Disp: , Rfl:   •  Cholecalciferol (VITAMIN D3) 2000 units tablet, Take 1 tablet by mouth Every Evening. HOLD PRIOR TO SURGERY, Disp: , Rfl:   •  cyanocobalamin 1000 MCG/ML injection, Inject 1,000 mcg into the shoulder, thigh, or buttocks Every 28 (Twenty-Eight) Days., Disp: , Rfl:   •  DULoxetine (CYMBALTA) 60 MG capsule, , Disp: , Rfl:   •  insulin aspart (NOVOLOG) 100 UNIT/ML injection, SLIDING SCALE, Disp: , Rfl:   •  Insulin Disposable Pump (V-GO 30) kit, Inject 1 Device under the skin., Disp: , Rfl:   •  losartan (COZAAR) 50 MG tablet, , Disp: , Rfl:   •  Magnesium Oxide (MAG-OXIDE PO), Take 1,200 mg by mouth., Disp: , Rfl:   •  memantine (NAMENDA XR) 28 MG capsule sustained-release 24 hr extended release capsule, TAKE ONE CAPSULE BY MOUTH DAILY, Disp: , Rfl:   •  metoclopramide (REGLAN) 5 MG tablet, , Disp: , Rfl:   •  Morphine (MS CONTIN) 60 MG 12 hr tablet, Take 30 mg by mouth Every 12  (Twelve) Hours., Disp: , Rfl:   •  Multiple Vitamin (MULTIVITAMIN) tablet, Take 1 tablet by mouth Daily. HOLD PRIOR TO SURGERY, Disp: , Rfl:   •  mycophenolate (CELLCEPT) 250 MG capsule, Take 750 mg by mouth., Disp: , Rfl:   •  Naloxegol Oxalate (MOVANTIK) 25 MG tablet, Take 25 mg by mouth Daily As Needed., Disp: , Rfl:   •  omeprazole (priLOSEC) 40 MG capsule, Take 40 mg by mouth Every Morning., Disp: , Rfl:   •  oxyCODONE-acetaminophen (PERCOCET) 5-325 MG per tablet, , Disp: , Rfl:   •  rivastigmine (EXELON) 6 MG capsule, TAKE ONE CAPSULE BY MOUTH TWICE A DAY, Disp: , Rfl:   •  sennosides-docusate (PERICOLACE) 8.6-50 MG per tablet, Take 2 tablets by mouth Daily., Disp: , Rfl:   •  spironolactone (ALDACTONE) 25 MG tablet, TAKE ONE TABLET BY MOUTH DAILY, Disp: , Rfl:   •  tacrolimus (PROGRAF) 1 MG capsule, Take 1 mg by mouth 2 (Two) Times a Day. TAKES 8 AM AND 8 PM, Disp: , Rfl:   •  tamsulosin (FLOMAX) 0.4 MG capsule 24 hr capsule, Take 0.4 mg by mouth Daily. TAKES @ 1200 NOON, Disp: , Rfl:   •  Testosterone Cypionate (DEPOTESTOTERONE CYPIONATE) 200 MG/ML injection, , Disp: , Rfl:   •  ziconotide (PF) 25 mcg/mL, by Intrathecal route Continuous., Disp: 20 mL, Rfl: 0    Current Outpatient Medications on File Prior to Visit   Medication Sig Dispense Refill   • albuterol (PROVENTIL HFA;VENTOLIN HFA) 108 (90 Base) MCG/ACT inhaler Inhale 2 puffs Every 4 (Four) Hours As Needed for Wheezing.     • amLODIPine (NORVASC) 10 MG tablet      • aspirin 81 MG EC tablet Take 81 mg by mouth. HOLD PRIOR TO SURGERY     • atorvastatin (LIPITOR) 10 MG tablet      • baclofen (LIORESAL) 10 MG tablet Take 10 mg by mouth 3 (Three) Times a Day.     • busPIRone (BUSPAR) 15 MG tablet      • chlorthalidone (HYGROTON) 25 MG tablet Take 25 mg by mouth Every Morning.     • Cholecalciferol (VITAMIN D3) 2000 units tablet Take 1 tablet by mouth Every Evening. HOLD PRIOR TO SURGERY     • cyanocobalamin 1000 MCG/ML injection Inject 1,000 mcg into the  shoulder, thigh, or buttocks Every 28 (Twenty-Eight) Days.     • DULoxetine (CYMBALTA) 60 MG capsule      • insulin aspart (NOVOLOG) 100 UNIT/ML injection SLIDING SCALE     • Insulin Disposable Pump (V-GO 30) kit Inject 1 Device under the skin.     • losartan (COZAAR) 50 MG tablet      • Magnesium Oxide (MAG-OXIDE PO) Take 1,200 mg by mouth.     • memantine (NAMENDA XR) 28 MG capsule sustained-release 24 hr extended release capsule TAKE ONE CAPSULE BY MOUTH DAILY     • metoclopramide (REGLAN) 5 MG tablet      • Morphine (MS CONTIN) 60 MG 12 hr tablet Take 30 mg by mouth Every 12 (Twelve) Hours.     • Multiple Vitamin (MULTIVITAMIN) tablet Take 1 tablet by mouth Daily. HOLD PRIOR TO SURGERY     • mycophenolate (CELLCEPT) 250 MG capsule Take 750 mg by mouth.     • Naloxegol Oxalate (MOVANTIK) 25 MG tablet Take 25 mg by mouth Daily As Needed.     • omeprazole (priLOSEC) 40 MG capsule Take 40 mg by mouth Every Morning.     • oxyCODONE-acetaminophen (PERCOCET) 5-325 MG per tablet      • rivastigmine (EXELON) 6 MG capsule TAKE ONE CAPSULE BY MOUTH TWICE A DAY     • sennosides-docusate (PERICOLACE) 8.6-50 MG per tablet Take 2 tablets by mouth Daily.     • spironolactone (ALDACTONE) 25 MG tablet TAKE ONE TABLET BY MOUTH DAILY     • tacrolimus (PROGRAF) 1 MG capsule Take 1 mg by mouth 2 (Two) Times a Day. TAKES 8 AM AND 8 PM     • tamsulosin (FLOMAX) 0.4 MG capsule 24 hr capsule Take 0.4 mg by mouth Daily. TAKES @ 1200 NOON     • Testosterone Cypionate (DEPOTESTOTERONE CYPIONATE) 200 MG/ML injection      • ziconotide (PF) 25 mcg/mL by Intrathecal route Continuous. 20 mL 0     No current facility-administered medications on file prior to visit.       Patient Active Problem List   Diagnosis   • Lumbar radiculopathy   • Spinal stenosis of lumbar region   • Spinal cord stimulator status   • Other chronic pain       Past Medical History:   Diagnosis Date   • Acid reflux    • Anxiety    • Asthma    • Chronic back pain    •  Depression    • Hypertension    • Joint pain    • Low back pain    • MCI (mild cognitive impairment) with memory loss    • Myocardial infarct (CMS/[facility]) 1991, 1994, 1995, 2002, 2005,   • Neuropathy in diabetes (CMS/[facility])    • Osteoarthritis    • Peripheral neuropathy    • Sleep apnea     CPAP   • Vitamin B 12 deficiency        Past Surgical History:   Procedure Laterality Date   • APPENDECTOMY     • CARDIAC CATHETERIZATION      MULTIPLE   • CARDIAC SURGERY  1995    CLOT REMOVED   • CHEST SURGERY  11/1995    REPAIR CHEST BONE SURGERY   • CORONARY ARTERY BYPASS GRAFT      5 VESSELS   • GALLBLADDER SURGERY     • HEART TRANSPLANT     • HEART TRANSPLANT  06/17/2008   • HEART TRANSPLANT  2008   • KNEE ARTHROPLASTY Left 11/2006   • KNEE ARTHROSCOPY Left 1994   • KNEE CARTILAGE SURGERY Right 10/17/2017   • KNEE SURGERY Bilateral    • PAIN PUMP TRIAL INJECTION ONLY N/A 6/18/2021    Procedure: Injection Single for Pain Pump Trial;  Surgeon: Stevo Brian MD;  Location: [facility] MAIN OR;  Service: Pain Management;  Laterality: N/A;   • PROSTATE SURGERY      REPAIR   • SHOULDER SURGERY Right    • SPINAL CORD STIMULATOR IMPLANT N/A 4/20/2018    Procedure: SPINAL CORD STIMULATOR INSERTION PHASE 2;  Surgeon: Stevo Brian MD;  Location: [facility] MAIN OR;  Service: Pain Management   • TOTAL KNEE ARTHROPLASTY REVISION Left 10/30/2009       No family history on file.    Social History     Socioeconomic History   • Marital status:      Spouse name: Not on file   • Number of children: Not on file   • Years of education: Not on file   • Highest education level: Not on file   Tobacco Use   • Smoking status: Former Smoker   • Smokeless tobacco: Never Used   Vaping Use   • Vaping Use: Never used   Substance and Sexual Activity   • Alcohol use: No   • Drug use: No   • Sexual activity: Defer       -------

## 2021-07-14 NOTE — PROGRESS NOTES
-------    The following portions of the patient's history were reviewed and updated as appropriate: allergies, current medications, past family history, past medical history, past social history, past surgical history and problem list.    Allergies   Allergen Reactions   • Coreg [Carvedilol] Hives   • Singulair [Montelukast Sodium] Other (See Comments)     weakness   • Theophyllines Other (See Comments)     weakness         Current Outpatient Medications:   •  albuterol (PROVENTIL HFA;VENTOLIN HFA) 108 (90 Base) MCG/ACT inhaler, Inhale 2 puffs Every 4 (Four) Hours As Needed for Wheezing., Disp: , Rfl:   •  amLODIPine (NORVASC) 10 MG tablet, , Disp: , Rfl:   •  aspirin 81 MG EC tablet, Take 81 mg by mouth. HOLD PRIOR TO SURGERY, Disp: , Rfl:   •  atorvastatin (LIPITOR) 10 MG tablet, , Disp: , Rfl:   •  baclofen (LIORESAL) 10 MG tablet, Take 10 mg by mouth 3 (Three) Times a Day., Disp: , Rfl:   •  busPIRone (BUSPAR) 15 MG tablet, , Disp: , Rfl:   •  chlorthalidone (HYGROTON) 25 MG tablet, Take 25 mg by mouth Every Morning., Disp: , Rfl:   •  Cholecalciferol (VITAMIN D3) 2000 units tablet, Take 1 tablet by mouth Every Evening. HOLD PRIOR TO SURGERY, Disp: , Rfl:   •  cyanocobalamin 1000 MCG/ML injection, Inject 1,000 mcg into the shoulder, thigh, or buttocks Every 28 (Twenty-Eight) Days., Disp: , Rfl:   •  DULoxetine (CYMBALTA) 60 MG capsule, , Disp: , Rfl:   •  insulin aspart (NOVOLOG) 100 UNIT/ML injection, SLIDING SCALE, Disp: , Rfl:   •  Insulin Disposable Pump (V-GO 30) kit, Inject 1 Device under the skin., Disp: , Rfl:   •  losartan (COZAAR) 50 MG tablet, , Disp: , Rfl:   •  Magnesium Oxide (MAG-OXIDE PO), Take 1,200 mg by mouth., Disp: , Rfl:   •  memantine (NAMENDA XR) 28 MG capsule sustained-release 24 hr extended release capsule, TAKE ONE CAPSULE BY MOUTH DAILY, Disp: , Rfl:   •  metoclopramide (REGLAN) 5 MG tablet, , Disp: , Rfl:   •  Morphine (MS CONTIN) 60 MG 12 hr tablet, Take 30 mg by mouth Every 12  (Twelve) Hours., Disp: , Rfl:   •  Multiple Vitamin (MULTIVITAMIN) tablet, Take 1 tablet by mouth Daily. HOLD PRIOR TO SURGERY, Disp: , Rfl:   •  mycophenolate (CELLCEPT) 250 MG capsule, Take 750 mg by mouth., Disp: , Rfl:   •  Naloxegol Oxalate (MOVANTIK) 25 MG tablet, Take 25 mg by mouth Daily As Needed., Disp: , Rfl:   •  omeprazole (priLOSEC) 40 MG capsule, Take 40 mg by mouth Every Morning., Disp: , Rfl:   •  oxyCODONE-acetaminophen (PERCOCET) 5-325 MG per tablet, , Disp: , Rfl:   •  rivastigmine (EXELON) 6 MG capsule, TAKE ONE CAPSULE BY MOUTH TWICE A DAY, Disp: , Rfl:   •  sennosides-docusate (PERICOLACE) 8.6-50 MG per tablet, Take 2 tablets by mouth Daily., Disp: , Rfl:   •  spironolactone (ALDACTONE) 25 MG tablet, TAKE ONE TABLET BY MOUTH DAILY, Disp: , Rfl:   •  tacrolimus (PROGRAF) 1 MG capsule, Take 1 mg by mouth 2 (Two) Times a Day. TAKES 8 AM AND 8 PM, Disp: , Rfl:   •  tamsulosin (FLOMAX) 0.4 MG capsule 24 hr capsule, Take 0.4 mg by mouth Daily. TAKES @ 1200 NOON, Disp: , Rfl:   •  Testosterone Cypionate (DEPOTESTOTERONE CYPIONATE) 200 MG/ML injection, , Disp: , Rfl:   •  ziconotide (PF) 25 mcg/mL, by Intrathecal route Continuous., Disp: 20 mL, Rfl: 0    Current Outpatient Medications on File Prior to Visit   Medication Sig Dispense Refill   • albuterol (PROVENTIL HFA;VENTOLIN HFA) 108 (90 Base) MCG/ACT inhaler Inhale 2 puffs Every 4 (Four) Hours As Needed for Wheezing.     • amLODIPine (NORVASC) 10 MG tablet      • aspirin 81 MG EC tablet Take 81 mg by mouth. HOLD PRIOR TO SURGERY     • atorvastatin (LIPITOR) 10 MG tablet      • baclofen (LIORESAL) 10 MG tablet Take 10 mg by mouth 3 (Three) Times a Day.     • busPIRone (BUSPAR) 15 MG tablet      • chlorthalidone (HYGROTON) 25 MG tablet Take 25 mg by mouth Every Morning.     • Cholecalciferol (VITAMIN D3) 2000 units tablet Take 1 tablet by mouth Every Evening. HOLD PRIOR TO SURGERY     • cyanocobalamin 1000 MCG/ML injection Inject 1,000 mcg into the  shoulder, thigh, or buttocks Every 28 (Twenty-Eight) Days.     • DULoxetine (CYMBALTA) 60 MG capsule      • insulin aspart (NOVOLOG) 100 UNIT/ML injection SLIDING SCALE     • Insulin Disposable Pump (V-GO 30) kit Inject 1 Device under the skin.     • losartan (COZAAR) 50 MG tablet      • Magnesium Oxide (MAG-OXIDE PO) Take 1,200 mg by mouth.     • memantine (NAMENDA XR) 28 MG capsule sustained-release 24 hr extended release capsule TAKE ONE CAPSULE BY MOUTH DAILY     • metoclopramide (REGLAN) 5 MG tablet      • Morphine (MS CONTIN) 60 MG 12 hr tablet Take 30 mg by mouth Every 12 (Twelve) Hours.     • Multiple Vitamin (MULTIVITAMIN) tablet Take 1 tablet by mouth Daily. HOLD PRIOR TO SURGERY     • mycophenolate (CELLCEPT) 250 MG capsule Take 750 mg by mouth.     • Naloxegol Oxalate (MOVANTIK) 25 MG tablet Take 25 mg by mouth Daily As Needed.     • omeprazole (priLOSEC) 40 MG capsule Take 40 mg by mouth Every Morning.     • oxyCODONE-acetaminophen (PERCOCET) 5-325 MG per tablet      • rivastigmine (EXELON) 6 MG capsule TAKE ONE CAPSULE BY MOUTH TWICE A DAY     • sennosides-docusate (PERICOLACE) 8.6-50 MG per tablet Take 2 tablets by mouth Daily.     • spironolactone (ALDACTONE) 25 MG tablet TAKE ONE TABLET BY MOUTH DAILY     • tacrolimus (PROGRAF) 1 MG capsule Take 1 mg by mouth 2 (Two) Times a Day. TAKES 8 AM AND 8 PM     • tamsulosin (FLOMAX) 0.4 MG capsule 24 hr capsule Take 0.4 mg by mouth Daily. TAKES @ 1200 NOON     • Testosterone Cypionate (DEPOTESTOTERONE CYPIONATE) 200 MG/ML injection      • ziconotide (PF) 25 mcg/mL by Intrathecal route Continuous. 20 mL 0     No current facility-administered medications on file prior to visit.       Patient Active Problem List   Diagnosis   • Lumbar radiculopathy   • Spinal stenosis of lumbar region   • Spinal cord stimulator status   • Other chronic pain       Past Medical History:   Diagnosis Date   • Acid reflux    • Anxiety    • Asthma    • Chronic back pain    •  Depression    • Hypertension    • Joint pain    • Low back pain    • MCI (mild cognitive impairment) with memory loss    • Myocardial infarct (CMS/[facility]) 1991, 1994, 1995, 2002, 2005,   • Neuropathy in diabetes (CMS/[facility])    • Osteoarthritis    • Peripheral neuropathy    • Sleep apnea     CPAP   • Vitamin B 12 deficiency        Past Surgical History:   Procedure Laterality Date   • APPENDECTOMY     • CARDIAC CATHETERIZATION      MULTIPLE   • CARDIAC SURGERY  1995    CLOT REMOVED   • CHEST SURGERY  11/1995    REPAIR CHEST BONE SURGERY   • CORONARY ARTERY BYPASS GRAFT      5 VESSELS   • GALLBLADDER SURGERY     • HEART TRANSPLANT     • HEART TRANSPLANT  06/17/2008   • HEART TRANSPLANT  2008   • KNEE ARTHROPLASTY Left 11/2006   • KNEE ARTHROSCOPY Left 1994   • KNEE CARTILAGE SURGERY Right 10/17/2017   • KNEE SURGERY Bilateral    • PAIN PUMP TRIAL INJECTION ONLY N/A 6/18/2021    Procedure: Injection Single for Pain Pump Trial;  Surgeon: Stevo Brian MD;  [place] [facility] [place];  Service: Pain Management;  Laterality: N/A;   • PROSTATE SURGERY      REPAIR   • SHOULDER SURGERY Right    • SPINAL CORD STIMULATOR IMPLANT N/A 4/20/2018    Procedure: SPINAL CORD STIMULATOR INSERTION PHASE 2;  Surgeon: Stevo Brian MD;  Location[facility] MAIN OR;  Service: Pain Management   • TOTAL KNEE ARTHROPLASTY REVISION Left 10/30/2009       No family history on file.    Social History     Socioeconomic History   • Marital status:      Spouse name: Not on file   • Number of children: Not on file   • Years of education: Not on file   • Highest education level: Not on file   Tobacco Use   • Smoking status: Former Smoker   • Smokeless tobacco: Never Used   Vaping Use   • Vaping Use: Never used   Substance and Sexual Activity   • Alcohol use: No   • Drug use: No   • Sexual activity: Defer       -------       Statement Selected

## 2021-07-14 NOTE — SIGNIFICANT NOTE
Education provided the Patient on the following:    - Nothing to Eat or Drink after MN the night before the procedure  -Your required COVID Test is Scheduled on          Between the Hours of 2889-2957  -You will only be notified if your COVID test Result is POSITIVE  -The importance of reducing your number of contacts by self quarantining after you COVID test until the date of your Surgery  -You will need to have someone drive you home after your Surgery and remain with you for 24 hours after the Procedure  - The date of your procedure, your are welcome to have one visitor at bedside or remain within 10-15 minutes of [facility]  -Please wear warm socks when you arrive for your Surgery  -Remove all jewelry and leave any valuables before arriving on the date of your procedure (all will have to be removed before leaving preop)  -You will need to arrive at    8       on   7/16        for your Surgery  -Feel free to contact us at: [phone] with any additional questions/concerns

## 2021-07-15 RX ORDER — CEFAZOLIN SODIUM 2 G/50ML
2 SOLUTION INTRAVENOUS ONCE
Status: COMPLETED | OUTPATIENT
Start: 2021-07-16 | End: 2021-07-16

## 2021-07-16 ENCOUNTER — ANESTHESIA (OUTPATIENT)
Dept: SURGERY | Facility: SURGERY CENTER | Age: 76
End: 2021-07-16

## 2021-07-16 ENCOUNTER — HOSPITAL ENCOUNTER (OUTPATIENT)
Facility: SURGERY CENTER | Age: 76
Setting detail: HOSPITAL OUTPATIENT SURGERY
Discharge: HOME | End: 2021-07-16
Attending: ANESTHESIOLOGY | Admitting: ANESTHESIOLOGY

## 2021-07-16 ENCOUNTER — ANESTHESIA EVENT (OUTPATIENT)
Dept: SURGERY | Facility: SURGERY CENTER | Age: 76
End: 2021-07-16

## 2021-07-16 ENCOUNTER — TRANSCRIBE ORDERS (OUTPATIENT)
Dept: SURGERY | Facility: SURGERY CENTER | Age: 76
End: 2021-07-16

## 2021-07-16 ENCOUNTER — HOSPITAL ENCOUNTER (OUTPATIENT)
Dept: GENERAL RADIOLOGY | Facility: SURGERY CENTER | Age: 76
End: 2021-07-16

## 2021-07-16 VITALS
WEIGHT: 189 LBS | BODY MASS INDEX: 26.46 KG/M2 | OXYGEN SATURATION: 97 % | HEART RATE: 86 BPM | TEMPERATURE: 97.5 F | DIASTOLIC BLOOD PRESSURE: 95 MMHG | SYSTOLIC BLOOD PRESSURE: 151 MMHG | HEIGHT: 71 IN | RESPIRATION RATE: 16 BRPM

## 2021-07-16 DIAGNOSIS — Z41.9 SURGERY, ELECTIVE: Primary | ICD-10-CM

## 2021-07-16 DIAGNOSIS — M48.061 SPINAL STENOSIS OF LUMBAR REGION, UNSPECIFIED WHETHER NEUROGENIC CLAUDICATION PRESENT: ICD-10-CM

## 2021-07-16 DIAGNOSIS — Z41.9 SURGERY, ELECTIVE: ICD-10-CM

## 2021-07-16 DIAGNOSIS — G89.29 OTHER CHRONIC PAIN: ICD-10-CM

## 2021-07-16 DIAGNOSIS — M54.16 LUMBAR RADICULOPATHY: ICD-10-CM

## 2021-07-16 LAB — GLUCOSE BLDC GLUCOMTR-MCNC: 125 MG/DL (ref 70–130)

## 2021-07-16 PROCEDURE — 25010000002 GENTAMICIN PER 80 MG: Performed by: ANESTHESIOLOGY

## 2021-07-16 PROCEDURE — 63661 REMOVE SPINE ELTRD PERQ ARAY: CPT | Performed by: ANESTHESIOLOGY

## 2021-07-16 PROCEDURE — 25010000003 LIDOCAINE 1 % SOLUTION 20 ML VIAL: Performed by: ANESTHESIOLOGY

## 2021-07-16 PROCEDURE — 62350 IMPLANT SPINAL CANAL CATH: CPT | Performed by: ANESTHESIOLOGY

## 2021-07-16 PROCEDURE — 00HU0MZ INSERTION OF NEUROSTIMULATOR LEAD INTO SPINAL CANAL, OPEN APPROACH: ICD-10-PCS | Performed by: ANESTHESIOLOGY

## 2021-07-16 PROCEDURE — 62362 IMPLANT SPINE INFUSION PUMP: CPT | Performed by: ANESTHESIOLOGY

## 2021-07-16 PROCEDURE — 25010000003 CEFAZOLIN SODIUM-DEXTROSE 2-3 GM-%(50ML) RECONSTITUTED SOLUTION: Performed by: ANESTHESIOLOGY

## 2021-07-16 PROCEDURE — C1787 PATIENT PROGR, NEUROSTIM: HCPCS | Performed by: ANESTHESIOLOGY

## 2021-07-16 PROCEDURE — C1889 IMPLANT/INSERT DEVICE, NOC: HCPCS | Performed by: ANESTHESIOLOGY

## 2021-07-16 PROCEDURE — 25010000002 PROPOFOL 10 MG/ML EMULSION: Performed by: ANESTHESIOLOGY

## 2021-07-16 PROCEDURE — C1755 CATHETER, INTRASPINAL: HCPCS | Performed by: ANESTHESIOLOGY

## 2021-07-16 PROCEDURE — 63688 REV/RMV IMP SP NPG/R DTCH CN: CPT | Performed by: ANESTHESIOLOGY

## 2021-07-16 PROCEDURE — C1772 INFUSION PUMP, PROGRAMMABLE: HCPCS | Performed by: ANESTHESIOLOGY

## 2021-07-16 PROCEDURE — 00PU0MZ REMOVAL OF NEUROSTIMULATOR LEAD FROM SPINAL CANAL, OPEN APPROACH: ICD-10-PCS | Performed by: ANESTHESIOLOGY

## 2021-07-16 PROCEDURE — 25010000002 VANCOMYCIN 1 G RECONSTITUTED SOLUTION 1 EACH VIAL: Performed by: ANESTHESIOLOGY

## 2021-07-16 PROCEDURE — 0JPT0MZ REMOVAL OF STIMULATOR GENERATOR FROM TRUNK SUBCUTANEOUS TISSUE AND FASCIA, OPEN APPROACH: ICD-10-PCS | Performed by: ANESTHESIOLOGY

## 2021-07-16 PROCEDURE — 0JH70MZ INSERTION OF STIMULATOR GENERATOR INTO BACK SUBCUTANEOUS TISSUE AND FASCIA, OPEN APPROACH: ICD-10-PCS | Performed by: ANESTHESIOLOGY

## 2021-07-16 PROCEDURE — 25010000003 CEFAZOLIN PER 500 MG: Performed by: ANESTHESIOLOGY

## 2021-07-16 PROCEDURE — 25010000002 FENTANYL CITRATE (PF) 250 MCG/5ML SOLUTION: Performed by: ANESTHESIOLOGY

## 2021-07-16 DEVICE — ENV ANTIBAC TYRX NEURO ABS LG: Type: IMPLANTABLE DEVICE | Site: BACK | Status: FUNCTIONAL

## 2021-07-16 DEVICE — CATH INTRATHCL ASCENDA SHRT 1PC16G114.3: Type: IMPLANTABLE DEVICE | Site: BACK | Status: FUNCTIONAL

## 2021-07-16 DEVICE — PUMP NEURO PROGRAM SYNCHROMED2 FLTR 20ML: Type: IMPLANTABLE DEVICE | Site: BACK | Status: FUNCTIONAL

## 2021-07-16 RX ORDER — LIDOCAINE HYDROCHLORIDE 10 MG/ML
0.5 INJECTION, SOLUTION INFILTRATION; PERINEURAL ONCE AS NEEDED
Status: DISCONTINUED | OUTPATIENT
Start: 2021-07-16 | End: 2021-07-16 | Stop reason: HOSPADM

## 2021-07-16 RX ORDER — KETAMINE HYDROCHLORIDE 50 MG/ML
INJECTION, SOLUTION, CONCENTRATE INTRAMUSCULAR; INTRAVENOUS AS NEEDED
Status: DISCONTINUED | OUTPATIENT
Start: 2021-07-16 | End: 2021-07-16 | Stop reason: SURG

## 2021-07-16 RX ORDER — IBUPROFEN 200 MG
600 TABLET ORAL ONCE AS NEEDED
Status: DISCONTINUED | OUTPATIENT
Start: 2021-07-16 | End: 2021-07-16 | Stop reason: HOSPADM

## 2021-07-16 RX ORDER — SODIUM CHLORIDE, SODIUM LACTATE, POTASSIUM CHLORIDE, CALCIUM CHLORIDE 600; 310; 30; 20 MG/100ML; MG/100ML; MG/100ML; MG/100ML
1000 INJECTION, SOLUTION INTRAVENOUS CONTINUOUS
Status: DISCONTINUED | OUTPATIENT
Start: 2021-07-16 | End: 2021-07-16 | Stop reason: HOSPADM

## 2021-07-16 RX ORDER — LIDOCAINE HYDROCHLORIDE 10 MG/ML
0.5 INJECTION, SOLUTION INFILTRATION; PERINEURAL ONCE AS NEEDED
Status: CANCELLED | OUTPATIENT
Start: 2021-07-16

## 2021-07-16 RX ORDER — DIPHENHYDRAMINE HCL 25 MG
25 TABLET ORAL
Status: DISCONTINUED | OUTPATIENT
Start: 2021-07-16 | End: 2021-07-16 | Stop reason: HOSPADM

## 2021-07-16 RX ORDER — FENTANYL CITRATE 50 UG/ML
50 INJECTION, SOLUTION INTRAMUSCULAR; INTRAVENOUS
Status: DISCONTINUED | OUTPATIENT
Start: 2021-07-16 | End: 2021-07-16 | Stop reason: HOSPADM

## 2021-07-16 RX ORDER — FENTANYL CITRATE 50 UG/ML
INJECTION, SOLUTION INTRAMUSCULAR; INTRAVENOUS AS NEEDED
Status: DISCONTINUED | OUTPATIENT
Start: 2021-07-16 | End: 2021-07-16 | Stop reason: SURG

## 2021-07-16 RX ORDER — NALOXONE HCL 0.4 MG/ML
0.2 VIAL (ML) INJECTION AS NEEDED
Status: DISCONTINUED | OUTPATIENT
Start: 2021-07-16 | End: 2021-07-16 | Stop reason: HOSPADM

## 2021-07-16 RX ORDER — SODIUM CHLORIDE 0.9 % (FLUSH) 0.9 %
10 SYRINGE (ML) INJECTION AS NEEDED
Status: DISCONTINUED | OUTPATIENT
Start: 2021-07-16 | End: 2021-07-16 | Stop reason: HOSPADM

## 2021-07-16 RX ORDER — SODIUM CHLORIDE 0.9 % (FLUSH) 0.9 %
10 SYRINGE (ML) INJECTION EVERY 12 HOURS SCHEDULED
Status: DISCONTINUED | OUTPATIENT
Start: 2021-07-16 | End: 2021-07-16 | Stop reason: HOSPADM

## 2021-07-16 RX ORDER — PROPOFOL 10 MG/ML
VIAL (ML) INTRAVENOUS AS NEEDED
Status: DISCONTINUED | OUTPATIENT
Start: 2021-07-16 | End: 2021-07-16 | Stop reason: SURG

## 2021-07-16 RX ORDER — SULFAMETHOXAZOLE AND TRIMETHOPRIM 800; 160 MG/1; MG/1
1 TABLET ORAL 2 TIMES DAILY
Qty: 10 TABLET | Refills: 0 | Status: SHIPPED | OUTPATIENT
Start: 2021-07-16 | End: 2023-02-20

## 2021-07-16 RX ORDER — LIDOCAINE HYDROCHLORIDE 20 MG/ML
INJECTION, SOLUTION INFILTRATION; PERINEURAL AS NEEDED
Status: DISCONTINUED | OUTPATIENT
Start: 2021-07-16 | End: 2021-07-16 | Stop reason: SURG

## 2021-07-16 RX ORDER — MIDAZOLAM HYDROCHLORIDE 1 MG/ML
0.5 INJECTION INTRAMUSCULAR; INTRAVENOUS
Status: DISCONTINUED | OUTPATIENT
Start: 2021-07-16 | End: 2021-07-16 | Stop reason: HOSPADM

## 2021-07-16 RX ORDER — SODIUM CHLORIDE, SODIUM LACTATE, POTASSIUM CHLORIDE, CALCIUM CHLORIDE 600; 310; 30; 20 MG/100ML; MG/100ML; MG/100ML; MG/100ML
9 INJECTION, SOLUTION INTRAVENOUS CONTINUOUS
Status: DISCONTINUED | OUTPATIENT
Start: 2021-07-16 | End: 2021-07-16 | Stop reason: HOSPADM

## 2021-07-16 RX ORDER — DIPHENHYDRAMINE HYDROCHLORIDE 50 MG/ML
12.5 INJECTION INTRAMUSCULAR; INTRAVENOUS
Status: DISCONTINUED | OUTPATIENT
Start: 2021-07-16 | End: 2021-07-16 | Stop reason: HOSPADM

## 2021-07-16 RX ORDER — SODIUM CHLORIDE, SODIUM LACTATE, POTASSIUM CHLORIDE, CALCIUM CHLORIDE 600; 310; 30; 20 MG/100ML; MG/100ML; MG/100ML; MG/100ML
1000 INJECTION, SOLUTION INTRAVENOUS CONTINUOUS
Status: CANCELLED | OUTPATIENT
Start: 2021-07-16

## 2021-07-16 RX ORDER — FLUMAZENIL 0.1 MG/ML
0.2 INJECTION INTRAVENOUS AS NEEDED
Status: DISCONTINUED | OUTPATIENT
Start: 2021-07-16 | End: 2021-07-16 | Stop reason: HOSPADM

## 2021-07-16 RX ADMIN — PROPOFOL 50 MG: 10 INJECTION, EMULSION INTRAVENOUS at 10:35

## 2021-07-16 RX ADMIN — KETAMINE HYDROCHLORIDE 15 MG: 50 INJECTION, SOLUTION INTRAMUSCULAR; INTRAVENOUS at 10:54

## 2021-07-16 RX ADMIN — CEFAZOLIN SODIUM 2 G: 2 SOLUTION INTRAVENOUS at 10:36

## 2021-07-16 RX ADMIN — SODIUM CHLORIDE, POTASSIUM CHLORIDE, SODIUM LACTATE AND CALCIUM CHLORIDE 1000 ML: 600; 310; 30; 20 INJECTION, SOLUTION INTRAVENOUS at 08:54

## 2021-07-16 RX ADMIN — FENTANYL CITRATE 25 MCG: 50 INJECTION, SOLUTION INTRAMUSCULAR; INTRAVENOUS at 10:35

## 2021-07-16 RX ADMIN — KETAMINE HYDROCHLORIDE 10 MG: 50 INJECTION, SOLUTION INTRAMUSCULAR; INTRAVENOUS at 10:35

## 2021-07-16 RX ADMIN — SODIUM CHLORIDE, SODIUM LACTATE, POTASSIUM CHLORIDE, AND CALCIUM CHLORIDE: .6; .31; .03; .02 INJECTION, SOLUTION INTRAVENOUS at 10:35

## 2021-07-16 RX ADMIN — FENTANYL CITRATE 25 MCG: 50 INJECTION, SOLUTION INTRAMUSCULAR; INTRAVENOUS at 10:58

## 2021-07-16 RX ADMIN — PROPOFOL 50 MCG/KG/MIN: 10 INJECTION, EMULSION INTRAVENOUS at 10:35

## 2021-07-16 RX ADMIN — LIDOCAINE HYDROCHLORIDE 40 MG: 20 INJECTION, SOLUTION INFILTRATION; PERINEURAL at 10:35

## 2021-07-16 NOTE — DISCHARGE INSTRUCTIONS
[facility] [place] Post-op Instructions after Spinal Cord Stimulation Trials / Implants      - A responsible adult should accompany you on discharge and for 24 hours following your surgery.    - You should rest for the remainder of the day. You may have occasional dizziness. Have assistance available.    - Due to the potential effects of the intravenous sedation or anesthetics, DO NOT drive or operate hazardous machinery today.  Do not make important decisions today.  Do not drink alcohol for 12 hours.    - Resume intake slowly. Start with liquids, progress to soft foods then advance to your regular diet.    - For 24 hours, or while taking pain or nausea medicines, do not drive, operate machinery, drink alcohol or make any important decisions.    - If you should have any of the following symptoms, call your doctor:  Increase in swelling, redness or discharge @ incision; Severe leg weakness/numbness; Severe Pain; Persistent bleeding; Persistent nausea and vomiting; Fever greater than 101 degrees F    - Keep dressing clean, dry and intact. For Permanent Implant, remove dressing on day 3. If given abdominal binder or if instructed to obtain one, wear as much as possible until office followup.    - Dr. Austin or Dr. Brian or one of our [facility] clinicians will remove outer dressing in the office after SCS trial.    - Steri-strips should remain in place for up to 2 weeks. They will gradually come off on their own or your doctor will remove them at your follow-up visit.    - You MAY NOT bathe or shower during the SCS trial.    - Keep any staples clean & dry until removal. DO NOT bathe, shower or go into a pool until staples are removed.    - Apply ice pack for 2-3 days as tolerated.    - Information given on new medications.    - You may resume normal activity slowly as tolerated    - No strenuous activity! - no lifting, no bending, no twisting during an SCS trial and for at least 8 weeks after an implant.  NO  LIFTING more than 10 lbs.    - No driving automobile for today. Turn off stimulator when driving.    - You may return to work/school 24 hours after SCS trial and 72 hrs after SCS Implant.  Again, follow the above instructions to avoid strenuous activity and/or lifting.    - IF YOU TAKE BLOOD THINNERS, CONFIRM WITH US WHEN TO RESTART BLOOD THINNERS!    - TAKE TYLENOL AS NEEDED AS LONG AS YOU HAVE NO CONTRAINDICATIONS.  You may continue to take your prescribed pain medication as needed unless instructed otherwise.    - Call [facility] at [phone] if you experience persistent headache, persistent bleeding for drainage from the surgical site, temperature over 101°, or severe pain not relieved by basic treatment or prescribed oral medication.    - Refer to Doctor’s instruction sheet.    - Call our office at phone number [phone] for an appointment if not scheduled.    - IN THE EVENT OF A LIFE THREATENING EMERGENCY, (CHEST PAIN, BREATHING DIFFICULTIES, PARALYSIS…) YOU SHOULD GO TO YOUR NEAREST EMERGENCY ROOM.

## 2021-07-16 NOTE — ANESTHESIA PREPROCEDURE EVALUATION
Anesthesia Evaluation     Patient summary reviewed and Nursing notes reviewed   NPO Solid Status: > 8 hours  NPO Liquid Status: > 2 hours           Airway   Mallampati: I  TM distance: >3 FB  Neck ROM: full  No difficulty expected  Dental - normal exam     Pulmonary - normal exam   (+) sleep apnea on CPAP,   (-) asthma  Cardiovascular - normal exam  Exercise tolerance: good (4-7 METS)    (+) hypertension well controlled 2 medications or greater, past MI  >12 months, CABG,       Neuro/Psych  (+) numbness, psychiatric history,     GI/Hepatic/Renal/Endo    (+)  GERD,  diabetes mellitus type 2 well controlled using insulin,     Musculoskeletal     Abdominal  - normal exam    Bowel sounds: normal.   Substance History      OB/GYN          Other   arthritis,      ROS/Med Hx Other: S/p heart transplant                  Anesthesia Plan    ASA 3     MAC       Anesthetic plan, all risks, benefits, and alternatives have been provided, discussed and informed consent has been obtained with: patient.

## 2021-07-16 NOTE — OP NOTE
Intrathecal Drug Delivery System Implant WITH necessary Spinal Cord Stimulation System Explant  [facility]    CPT:  50617, 11895, [id number], [id number]    Preop Dx: Lumbar radiculopathy.  Other diagnoses include Mechanical Complication of SCS system.  Lmbar degenerative disc disease, lumbar spinal stenosis, scoliosis.  Postop Dx: Same as preop dx.      SCS Neuromodulation System: Nordic [employer]    IPG Battery Type Removed:  Montage MRI  IPG Battery Location:  Left Lumbar Flank     # of Percutaneous SCS Leads:   Two  Type(s) of SCS Leads:  Linear 3 4  Anchoring Site/Sites of Leads: mid-lumbar    Intrathecal Pump System: Owl[employer]rot  Product Implanted:   SynchroMed 2, 20 mL    Catheter...   entering into the L1-2 interspace, advanced to a point with the distal tip at the T9 vertebral level, in the intrathecal space    Pump pocket site: Left Lumbar Flank and This was the same pocket from which the IPG battery was removed      Preprocedure Discussion with Patient regarding SCS explant...  Risks and complications were discussed with the patient prior to starting the procedure and informed consent was obtained.  We discussed various topics including but not limited to bleeding, infection, injury, allergic reactions, spinal cord and/or neural injury, implant site pain, post procedural site soreness, equipment fracture or inability to be removed, and risk of worsening clinical picture.  Regarding IT Pump Implant & use...   We discussed various topics including but not limited to bleeding, infection, injury, allergic reactions, spinal cord and/or neural injury, implant site pain, post procedural site soreness, equipment malfunction including but not limited to catheter fracture or migration or risk of high or low levels of medication delivery, risk of overdose & coma & death, risk of the lack of pain relief, and risk of worsening clinical picture.        Reason for procedure: He has had difficulty with use of  his spinal cord stimulator system, that is his relief being relies on the spinal stimulator has greatly diminished.  He was though getting some modest improvement with some sciatica symptoms but much less relief from the back pain.  He has some very significant degenerative disease in his low back.  The position of the spinal stimulator leads precluded the ability to appropriately place the intrathecal catheter when looking for an opportunity to enter the spine, that is the mid lower lumbar interlaminar spaces were difficult to consider because of scoliosis and degenerative changes, and the stimulator lead anchors in the mid lumbar area with the lead approaches in the upper lumbar area would have been in the way of the needle path to pass the catheter, so we elected to remove the stimulator device.  We had discussed that this could be at risk and the need and the patient consented to this prior to surgery.      Anesthesia/Sedation:  Monitored Anesthesia Care with Local Anesthetic  Antibiotics:     Cefazolin 2g IV immediately prior to incision    Description of Procedure:      After obtaining informed consent, IV access had been obtained by nursing staff in the preoperative area.  The patient was transported to the operative suite and was placed in a prone position.  Appropriate padding was placed around and under the patient.  Anesthesia care and cardiopulmonary monitoring maintained by member of the anesthesiology team throughout the procedure.  Perioperative antibodies were given prior to the incision per routine as indicated in the anesthesia record and indicated above.  The patient's surgical site was cleansed appropriately with routine cleansing, and then prepped in a sterile routine fashion.  The area was then draped in sterile routine fashion.     The area overlying the IPG was identified and the midline/equator of the device was marked on the skin.  The subcutaneous tissue and skin along this line was  anesthetized with approximately 10 ml of local anesthetic.  Also another approximate 10 mL was placed in the subcutaneous tissue cephalad and caudad to this marked incision line.  A #15 scalpel was used to dissect down to the level of the battery.  Careful dissection was utilized to release the IPG and the distal portion of the leads from the pocket capsule.  The leads were released from the IPG & the IPG battery was then placed on the back table and prepared for cleansing in order to return the device to the neuromodulation  for testing.  After removal of the IPG, the electrocautery was connected and then used judiciously to control any bleeding, and to release the battery pocket appropriately to free all the lead lengths within the pocket from capsule/scarring, in order to allow the leads to be pulled back to the midline.      The area overlying the anchoring devices was identified and the midline/equator of the devices was marked on the skin.  The subcutaneous tissue and skin along this vertical line was anesthetized with approximately 15 ml of local anesthetic.  Also another approximate 5-10 mL was placed in the subcutaneous tissue cephalad and caudad to this marked incision line. A #15 scalpel was used to dissect down to the level of the anchors.  Careful dissection was utilized to release the anchors and the distal portion of the leads inside this midline pocket from any encapsulating scar tissue.  After irrigating the battery pocket and the midline incisions with copious amounts of irrigant solution x3, the leads were pulled from the distal (IPG) end into and out of the midline incision, and the proximal ends (contacts portion) was slowly and easily pulled from the epidural space and into the midline incision, thereby removing all the leads and their anchors from the patient.  The leads were then placed on the back table and prepared for cleansing in order to return the device to the  neuromodulation  for testing.  The contacts on each lead were counted and all were present.      Once more, both incision sites were irrigated with copious amounts of irrigant solution and suctioned.      The midline of the patient's spine for IT catheter insertion was identified and marked.  The skin and subcutaneous tissue were anesthetized with appropriate amounts of local anesthetic solution.  A midline incision was made & confirmed to be appropriate in depth, down to the lumbodorsal fascia, with judicious use of electrocautery.  The entire area was irrigated with copious amounts of solution.      Needle entry site was about 1 1/2-2 vertebral levels below the intended interlaminar space for epidural access.  The entry point was medial to the pedicles, and inserted at acute angles of less than 45° and in to the interlaminar space noted above.  Loss-of-resistance technique was utilized to identify entry into the epidural space.  Aspiration was negative.  The catheter was readied, and then the needle was advanced 1-2 more millimeters to puncture the dura.  The catheter was inserted in the needle as noted above to the aforementioned target.  Lateral fluoroscopic view was utilized to confirm proper placement in the intrathecal space.    The needle was removed slowly from the insertion site with care not to advance to retract catheter tip position.  AP fluoroscopic imaging was checked sequentially to confirm no gross changes in position had occurred.  The catheter was secured to the underlying tissue using a proprietary locking anchor and nonabsorbable sutures.  Once more in AP fluoroscopic view was checked to confirm final catheter placement after anchoring was completed.  The incision site was irrigated with copious amounts of irrigant solution x2.    As noted above, the pump pocket site was identified.  This line was marked in a perpendicular fashion on the skin and then the area along the line was  anesthetized with copious amounts of local anesthetic, and also anesthetized caudad and cephalad to that line.  Also was anesthetized a line connecting the medial aspect of the pocket to the inferior aspect of the midline incision.  The incision was extended to about 1 cm in depth and then undermined caudad and cephalad to create a subcutaneous pocket at a depth of about 1 cm.  Electrocautery was used judiciously to control bleeding, and the pocket was then irrigated with copious amounts of irrigant solution ×2.    The pump was primed per routine and prepared on the back table, observing strict sterile technique upon filling the pump.    The catheter was tunneled from the midline incision to the nearest aspect of the pump pocket using the proprietary tunneling device.   The catheter was trimmed as appropriate and the catheter length was noted in the pump programming.   The catheter was connected to the pump in routine fashion using the locking pump catheter extension, and locked in place.  Catheter function was tested with positive aspiration from the side port confirming free CSF flow, and was noted to be functioning appropriately.  Excess catheter lead length was coiled beneath the pump battery and the pump battery was placed into the pocket.  The skin and subcutaneous tissues were easily opposable without any stress or duress on the overlying tissues.    Tyrx pouch was placed around the pump and the eyelets on the pump for sewed to underlying tissue.  Free flow the catheter was confirmed again from the side-port.    The pump pocket incision was closed with 2-0 Vicryl interrupted sutures for the underlying layer and Stapling Device for Skin Closure.  The midline incision was closed with 2-0 Vicryl interrupted sutures for the deep layer and Stapling Device for Skin Closure.  The incisions were dressed with Primacell antimicrobial dressing..        Estimated Blood Loss: Minimal  Specimens:   As  above  Complications:  No complications were noted.      Tolerance and discharge condition:    The patient tolerated the procedure well and was awakened from anesthesia without incident.  The patient was transported to the recovery area without difficulties. The patient was discharged home under the care of family members in stable and satisfactory condition.      Plan of care:    The patient was given our standard instruction sheet and will resume all medications as per the medication reconciliation sheet.  The patient will return to my office at the appropriate time scheduled in about one business day with the  device representative for further device programming and education about device function if necessary.  The patient will also present to my office in 10-14 days for a postoperative wound check.    The patient understands that there is any signs of complication, bleeding, infection, fever, chills, increasing back pain or spine pain, any neurologic deficit, or any other concerning finding, that the patient of a family member should call my office at [phone] at any time to access the answering service.      INITIAL DEVICE SETTINGS:  This 20 mL pump was implanted with a catheter length of 65.3 cm and the tip is at T9 on the AP fluoroscopic view.  The total catheter volume including the intrathecal catheter and the pump tubing comes out to be 0.284 mL.  The pump after being washed 3 times with 2 mL of Prialt was filled with 14 mL of Prialt, which is at stop concentration of 25 mcg/mL.  Priming bolus of 7.1 mcg was administered.  Daily dose was set at 1.25 mcg/day.      Tyrx pouch exp 5-8-22  Pump serial number [id number]  Catheter lot number [id number]

## 2021-07-16 NOTE — INTERVAL H&P NOTE
H&P updated. The patient was examined and in addendum, the patient is requesting consideration of spinal cord stimulator removal, so we will take a look under fluroscopy and see if we could remove without making extra incisions.

## 2021-07-16 NOTE — ANESTHESIA POSTPROCEDURE EVALUATION
Patient: Saurav Cotto Sr.    Procedure Summary     Date: 07/16/21 Room / Location: [facility] OR [facility] / [facility]    Anesthesia Start: 1031 Anesthesia Stop: 1255    Procedure: PAIN PUMP INSERTION, SPINAL CORD STIMULATOR REMOVAL PRIALT +++ LOW DOSE++ (N/A ) Diagnosis:       Spinal stenosis of lumbar region, unspecified whether neurogenic claudication present      Lumbar radiculopathy      Other chronic pain      (Spinal stenosis of lumbar region, unspecified whether neurogenic claudication present [M48.061])      (Lumbar radiculopathy [M54.16])      (Other chronic pain [G89.29])    Surgeons: Stevo Brian MD Provider: Cecil Ojeda MD    Anesthesia Type: MAC ASA Status: 3          Anesthesia Type: MAC    Vitals  Vitals Value Taken Time   /95 07/16/21 1310   Temp 37.8 °C (100.04 °F) 07/16/21 1329   Pulse 87 07/16/21 1312   Resp 16 07/16/21 1310   SpO2 98 % 07/16/21 1312   Vitals shown include unvalidated device data.        Post Anesthesia Care and Evaluation    Patient location during evaluation: bedside  Patient participation: complete - patient participated  Level of consciousness: awake  Pain management: adequate  Airway patency: patent  Anesthetic complications: No anesthetic complications  PONV Status: none  Cardiovascular status: acceptable  Respiratory status: acceptable  Hydration status: acceptable  Post Neuraxial Block status: Motor and sensory function returned to baseline

## 2021-07-19 ENCOUNTER — TELEPHONE (OUTPATIENT)
Dept: PAIN MEDICINE | Facility: CLINIC | Age: 76
End: 2021-07-19

## 2021-07-19 NOTE — TELEPHONE ENCOUNTER
Provider: DR BRANDT    Caller: ESTEE MAYO    Relationship to Patient: SELF    Phone Number: [phone]    Reason for Call: PATIENT SAID THAT TODAY HE COULD TAKE HIS SURGICAL BANDAID OFF AND STILL HAS STAPLES. PATIENT WAS WONDERING HOW TO CARE FOR HIS INCISION AFTER REMOVING THE SURGICAL BANDAID. PLEASE CALL BACK AND ADVISE.     When was the patient last seen: 7/16/21

## 2021-07-27 ENCOUNTER — OFFICE VISIT (OUTPATIENT)
Dept: PAIN MEDICINE | Facility: CLINIC | Age: 76
End: 2021-07-27

## 2021-07-27 VITALS
HEART RATE: 108 BPM | HEIGHT: 71 IN | TEMPERATURE: 97.3 F | SYSTOLIC BLOOD PRESSURE: 126 MMHG | OXYGEN SATURATION: 95 % | DIASTOLIC BLOOD PRESSURE: 82 MMHG | BODY MASS INDEX: 26.36 KG/M2 | RESPIRATION RATE: 16 BRPM

## 2021-07-27 DIAGNOSIS — M54.16 LUMBAR RADICULOPATHY: Primary | ICD-10-CM

## 2021-07-27 DIAGNOSIS — Z98.890 S/P INSERTION OF INTRATHECAL PUMP: ICD-10-CM

## 2021-07-27 DIAGNOSIS — G89.29 OTHER CHRONIC PAIN: ICD-10-CM

## 2021-07-27 DIAGNOSIS — M48.061 SPINAL STENOSIS OF LUMBAR REGION, UNSPECIFIED WHETHER NEUROGENIC CLAUDICATION PRESENT: ICD-10-CM

## 2021-07-27 PROCEDURE — 99024 POSTOP FOLLOW-UP VISIT: CPT | Performed by: NURSE PRACTITIONER

## 2021-07-27 NOTE — PROGRESS NOTES
CHIEF COMPLAINT  F/U back pain-PAIN PUMP INSERTION, SPINAL CORD STIMULATOR REMOVAL- patient called due to the increase of size of the area by his incision in his mid back.      Subjective   Saurav Cotto Sr. is a 76 y.o. male  who presents to the office for follow-up of procedure.  He completed a Intrathecal drug delivery system implant with necessary spinal cord stimulation system explant   on  7/16/2021 performed by Dr. Brian for management of lumbar radiculopathy. Patient reports 100% relief from the procedure.     He presents today with concerns of swelling over his midline incision.  Currently his pain is 0/10VAS in severity and states that his pain is well controlled.  He denies any drainage, erythema, streaking, fever, chills, general malaise, headache, back or leg pain or weakness.     Patient remained masked during entire encounter. No cough present. I donned a mask and eye protection throughout entire visit. Prior to donning mask and eye protection, hand hygiene was performed, as well as when it was doffed.  I was closer than 6 feet, but not for an extended period of time. No obvious exposure to any bodily fluids.    Back Pain  This is a chronic problem. The current episode started more than 1 year ago. Progression since onset: Improved since last office visit. The pain is at a severity of 0/10. The pain is moderate. Associated symptoms include weakness. Pertinent negatives include no abdominal pain, bladder incontinence, bowel incontinence, chest pain, dysuria, fever, headaches or numbness. Risk factors include lack of exercise and sedentary lifestyle. He has tried muscle relaxant for the symptoms. The treatment provided significant (pain pump with prialt therapy) relief.      PEG Assessment   What number best describes your pain on average in the past week?2  What number best describes how, during the past week, pain has interfered with your enjoyment of life?3  What number best describes how, during  "the past week, pain has interfered with your general activity?  5    The following portions of the patient's history were reviewed and updated as appropriate: allergies, current medications, past family history, past medical history, past social history, past surgical history and problem list.    Review of Systems   Constitutional: Positive for fatigue. Negative for activity change, chills and fever.   HENT: Negative for congestion.    Eyes: Negative for visual disturbance.   Respiratory: Negative for chest tightness and shortness of breath.    Cardiovascular: Negative for chest pain.   Gastrointestinal: Negative for abdominal pain, bowel incontinence, constipation and diarrhea.   Genitourinary: Negative for bladder incontinence, difficulty urinating and dysuria.   Musculoskeletal: Positive for back pain.   Neurological: Positive for weakness. Negative for dizziness, light-headedness, numbness and headaches.   Psychiatric/Behavioral: Negative for agitation and sleep disturbance. The patient is not nervous/anxious.      --  The aforementioned information the Chief Complaint section and above subjective data including any HPI data, and also the Review of Systems data, has been personally reviewed and affirmed.  --     Vitals:    07/27/21 1429   BP: 126/82   Pulse: 108   Resp: 16   Temp: 97.3 °F (36.3 °C)   SpO2: 95%   Height: 180.3 cm (71\")   PainSc: 0-No pain         Objective   Physical Exam  Vitals and nursing note reviewed.   Constitutional:       Appearance: Normal appearance. He is well-developed.   Eyes:      General: Lids are normal.   Cardiovascular:      Rate and Rhythm: Normal rate.   Pulmonary:      Effort: Pulmonary effort is normal.   Musculoskeletal:      Cervical back: Normal range of motion.   Skin:         Neurological:      Mental Status: He is alert and oriented to person, place, and time.   Psychiatric:         Attention and Perception: Attention normal.         Mood and Affect: Mood normal.       "   Speech: Speech normal.         Behavior: Behavior normal.         Judgment: Judgment normal.       Assessment/Plan   Diagnoses and all orders for this visit:    1. Lumbar radiculopathy (Primary)    2. Spinal stenosis of lumbar region, unspecified whether neurogenic claudication present    3. Other chronic pain    4. S/P insertion of intrathecal pump    Other orders  -     Cancel: ziconotide (PF) 25 mcg/mL; by Intrathecal route Continuous.  Dispense: 20 mL; Refill: 0    --- Incisions assessed by both myself and ABDULKADIR Rowe  --- Discussed with both patient and wife that while he does have post-operative edema there is current no concerning signs of infection.  Advised patient to wear an abdominal binder and to utilize indirect ice 15-20 minutes at a time 3-4 times a day to help with the edema.   --- Counseled on signs of infection which include but are not limited to incisional redness, streaking, drainage, warmth over incision sites, fever, chills, and general malaise. Advised patient and wife to notify our office for any of the above symptoms.    --- Couseled to keep incisions clean and dry, do NOT use any ointments or lotions over incision sites.   --- Intrathecal pump was interrogated in office today. Results are in chart.    The patient is currently at a dose of 1.25 of Prialt per day.     CADY is <81 months.    --- Follow-up at previously scheduled post-operative appointment.      WILDER REPORT    As the clinician, I personally reviewed the WILDER from 7/27/2021 while the patient was in the office today.     Dictated utilizing Dragon dictation.

## 2021-07-30 ENCOUNTER — OFFICE VISIT (OUTPATIENT)
Dept: PAIN MEDICINE | Facility: CLINIC | Age: 76
End: 2021-07-30

## 2021-07-30 VITALS
HEART RATE: 88 BPM | SYSTOLIC BLOOD PRESSURE: 128 MMHG | OXYGEN SATURATION: 95 % | RESPIRATION RATE: 20 BRPM | WEIGHT: 196 LBS | BODY MASS INDEX: 27.44 KG/M2 | HEIGHT: 71 IN | DIASTOLIC BLOOD PRESSURE: 80 MMHG | TEMPERATURE: 97.5 F

## 2021-07-30 DIAGNOSIS — Z98.890 S/P INSERTION OF INTRATHECAL PUMP: Primary | ICD-10-CM

## 2021-07-30 PROCEDURE — 99024 POSTOP FOLLOW-UP VISIT: CPT | Performed by: NURSE PRACTITIONER

## 2021-07-30 RX ORDER — KETOCONAZOLE 20 MG/ML
SHAMPOO TOPICAL
COMMUNITY
Start: 2021-07-06

## 2021-07-30 RX ORDER — DEXAMETHASONE 0.5 MG/1
TABLET ORAL
COMMUNITY
Start: 2021-07-19 | End: 2021-07-30 | Stop reason: ALTCHOICE

## 2021-07-30 RX ORDER — KETOCONAZOLE 20 MG/G
CREAM TOPICAL
COMMUNITY
Start: 2021-07-06 | End: 2022-03-23

## 2021-07-30 NOTE — PROGRESS NOTES
CHIEF COMPLAINT  F/u back pain. Pt here for mid back wound check.     Subjective   Saurav Cotto Sr. is a 76 y.o. male  who presents for follow-up.  He has a history of back pain. He completed a Intrathecal drug delivery system implant with necessary spinal cord stimulation system explant   on  7/16/2021 performed by Dr. Brian for management of lumbar radiculopathy.  He continues to report 100% pain relief.     He is here for a wound check today. His midline incision remains edematous, but is slightly improved from 7/27/2021. This incision is well approximated and is healing well with steri strips in place.  There is no warmth, streaking, or drainage.  His left low back incision over the pump is also edematous. It remains well approximated and is healing well with steri strips in place with no warmth, streaking, or drainage. There is redness at the previous sites of his staples, this is improved from 7/27/2021.     Patient has not been wearing the abdominal binder as recommended.     Patient remained masked during entire encounter. No cough present. I donned a mask and eye protection throughout entire visit. Prior to donning mask and eye protection, hand hygiene was performed, as well as when it was doffed.  I was closer than 6 feet, but not for an extended period of time. No obvious exposure to any bodily fluids.    Back Pain  This is a chronic problem. The current episode started more than 1 year ago. Progression since onset: Improved since last office visit. The pain is at a severity of 1/10. The pain is moderate. Pertinent negatives include no abdominal pain, bladder incontinence, bowel incontinence, chest pain, dysuria, fever, headaches, numbness or weakness. Risk factors include lack of exercise and sedentary lifestyle. He has tried muscle relaxant for the symptoms. The treatment provided significant (pain pump with prialt therapy) relief.      PEG Assessment   What number best describes your pain on  "average in the past week?1  What number best describes how, during the past week, pain has interfered with your enjoyment of life?1  What number best describes how, during the past week, pain has interfered with your general activity?  1    The following portions of the patient's history were reviewed and updated as appropriate: allergies, current medications, past family history, past medical history, past social history, past surgical history and problem list.    Review of Systems   Constitutional: Negative for activity change, fatigue and fever.   HENT: Negative for congestion.    Eyes: Negative for visual disturbance.   Respiratory: Negative for cough and shortness of breath.    Cardiovascular: Negative for chest pain.   Gastrointestinal: Negative for abdominal pain, bowel incontinence, constipation and diarrhea.   Genitourinary: Negative for bladder incontinence, difficulty urinating and dysuria.   Musculoskeletal: Positive for back pain.   Neurological: Negative for dizziness, weakness, light-headedness, numbness and headaches.   Psychiatric/Behavioral: Negative for agitation, sleep disturbance and suicidal ideas. The patient is not nervous/anxious.      --  The aforementioned information the Chief Complaint section and above subjective data including any HPI data, and also the Review of Systems data, has been personally reviewed and affirmed.  --    Vitals:    07/30/21 1305   BP: 128/80   Pulse: 88   Resp: 20   Temp: 97.5 °F (36.4 °C)   SpO2: 95%   Weight: 88.9 kg (196 lb)   Height: 180.3 cm (71\")   PainSc:   1   PainLoc: Back     Objective   Physical Exam  Vitals and nursing note reviewed.   Constitutional:       Appearance: Normal appearance. He is well-developed.   Eyes:      General: Lids are normal.   Cardiovascular:      Rate and Rhythm: Normal rate.   Pulmonary:      Effort: Pulmonary effort is normal.   Musculoskeletal:      Cervical back: Normal range of motion.   Skin:         Neurological:      " Mental Status: He is alert and oriented to person, place, and time.   Psychiatric:         Speech: Speech normal.         Behavior: Behavior normal.         Judgment: Judgment normal.         Assessment/Plan   Diagnoses and all orders for this visit:    1. S/P insertion of intrathecal pump (Primary)      --- Start utilizing abdominal binder to help with swelling over incision sites.  Notify our office for any redness, drainage, warmth, streaking, fever, chills, or flu like symptoms. Keep incisions clean and dry. Do not apply any ointments or lotions to incision sites.   --- Follow-up 2 weeks for incision check and prialt wash out.      WILDER REPORT    As the clinician, I personally reviewed the WILDER from 7/30/2021 while the patient was in the office today.    History and physical exam exhibit continued safe and appropriate use of controlled substances.     Dictated utilizing Dragon dictation.

## 2021-08-10 ENCOUNTER — OFFICE VISIT (OUTPATIENT)
Dept: PAIN MEDICINE | Facility: CLINIC | Age: 76
End: 2021-08-10

## 2021-08-10 VITALS
DIASTOLIC BLOOD PRESSURE: 91 MMHG | WEIGHT: 196.6 LBS | BODY MASS INDEX: 27.52 KG/M2 | HEIGHT: 71 IN | HEART RATE: 91 BPM | RESPIRATION RATE: 16 BRPM | SYSTOLIC BLOOD PRESSURE: 148 MMHG | OXYGEN SATURATION: 95 % | TEMPERATURE: 97.3 F

## 2021-08-10 DIAGNOSIS — Z98.890 S/P INSERTION OF INTRATHECAL PUMP: Primary | ICD-10-CM

## 2021-08-10 DIAGNOSIS — M48.061 SPINAL STENOSIS OF LUMBAR REGION, UNSPECIFIED WHETHER NEUROGENIC CLAUDICATION PRESENT: ICD-10-CM

## 2021-08-10 DIAGNOSIS — G89.29 OTHER CHRONIC PAIN: ICD-10-CM

## 2021-08-10 PROCEDURE — 62369 ANAL SP INF PMP W/REPRG&FILL: CPT | Performed by: NURSE PRACTITIONER

## 2021-08-13 ENCOUNTER — TELEPHONE (OUTPATIENT)
Dept: PAIN MEDICINE | Facility: CLINIC | Age: 76
End: 2021-08-13

## 2021-08-13 NOTE — TELEPHONE ENCOUNTER
Mr. Cotto called and asked me to let you or Jazmin that he is not getting any pain relief and would like an increase is his dose. Please advise.

## 2021-08-19 ENCOUNTER — OFFICE VISIT (OUTPATIENT)
Dept: PAIN MEDICINE | Facility: CLINIC | Age: 76
End: 2021-08-19

## 2021-08-19 VITALS
BODY MASS INDEX: 27.69 KG/M2 | HEIGHT: 71 IN | TEMPERATURE: 97.5 F | WEIGHT: 197.8 LBS | OXYGEN SATURATION: 92 % | RESPIRATION RATE: 16 BRPM | HEART RATE: 87 BPM | SYSTOLIC BLOOD PRESSURE: 148 MMHG | DIASTOLIC BLOOD PRESSURE: 79 MMHG

## 2021-08-19 DIAGNOSIS — G89.29 OTHER CHRONIC PAIN: ICD-10-CM

## 2021-08-19 DIAGNOSIS — M54.16 LUMBAR RADICULOPATHY: ICD-10-CM

## 2021-08-19 DIAGNOSIS — Z98.890 S/P INSERTION OF INTRATHECAL PUMP: Primary | ICD-10-CM

## 2021-08-19 PROCEDURE — 62368 ANALYZE SP INF PUMP W/REPROG: CPT | Performed by: NURSE PRACTITIONER

## 2021-08-19 PROCEDURE — 99214 OFFICE O/P EST MOD 30 MIN: CPT | Performed by: NURSE PRACTITIONER

## 2021-08-19 NOTE — PROGRESS NOTES
CHIEF COMPLAINT  F/u BACK PAIN- patient states that his pain has worsened since his last visit.     Subjective   Saurav Cotto . is a 76 y.o. male  who presents for follow-up.  He has a history of of back pain.      Today his pain is 5/10VAS in severity. He reports continued relief from his intrathecal pain pump, but has begun to wean his MS contin under the direction of his PCP and states his pain has increased.     Patient remained masked during entire encounter. No cough present. I donned a mask and eye protection throughout entire visit. Prior to donning mask and eye protection, hand hygiene was performed, as well as when it was doffed.  I was closer than 6 feet, but not for an extended period of time. No obvious exposure to any bodily fluids.    Back Pain  This is a chronic problem. The current episode started more than 1 year ago. The problem has been gradually worsening since onset. The pain is at a severity of 5/10. The pain is moderate. Pertinent negatives include no abdominal pain, bladder incontinence, bowel incontinence, chest pain, dysuria, fever, headaches, numbness or weakness. Risk factors include lack of exercise and sedentary lifestyle. He has tried muscle relaxant for the symptoms. The treatment provided significant (pain pump with prialt therapy) relief.      PEG Assessment   What number best describes your pain on average in the past week?5  What number best describes how, during the past week, pain has interfered with your enjoyment of life?5  What number best describes how, during the past week, pain has interfered with your general activity?  5    The following portions of the patient's history were reviewed and updated as appropriate: allergies, current medications, past family history, past medical history, past social history, past surgical history and problem list.    Review of Systems   Constitutional: Positive for fatigue. Negative for activity change, chills and fever.   HENT:  "Negative for congestion.    Eyes: Negative for visual disturbance.   Respiratory: Negative for chest tightness and shortness of breath.    Cardiovascular: Negative for chest pain.   Gastrointestinal: Positive for constipation. Negative for abdominal pain, bowel incontinence and diarrhea.   Genitourinary: Negative for bladder incontinence, difficulty urinating and dysuria.   Musculoskeletal: Positive for back pain.   Neurological: Negative for dizziness, weakness, light-headedness, numbness and headaches.   Psychiatric/Behavioral: Negative for agitation and sleep disturbance. The patient is not nervous/anxious.      --  The aforementioned information the Chief Complaint section and above subjective data including any HPI data, and also the Review of Systems data, has been personally reviewed and affirmed.  --    Vitals:    08/19/21 1106   BP: 148/79   Pulse: 87   Resp: 16   Temp: 97.5 °F (36.4 °C)   SpO2: 92%   Weight: 89.7 kg (197 lb 12.8 oz)   Height: 180.3 cm (71\")   PainSc:   5   PainLoc: Back     Objective   Physical Exam  Vitals and nursing note reviewed.   Constitutional:       Appearance: Normal appearance. He is well-developed.   Eyes:      General: Lids are normal.   Cardiovascular:      Rate and Rhythm: Normal rate.   Pulmonary:      Effort: Pulmonary effort is normal.   Musculoskeletal:      Cervical back: Normal range of motion.   Skin:         Neurological:      Mental Status: He is alert and oriented to person, place, and time.   Psychiatric:         Attention and Perception: Attention normal.         Mood and Affect: Mood normal.         Speech: Speech normal.         Behavior: Behavior normal.         Judgment: Judgment normal.       Assessment/Plan   Diagnoses and all orders for this visit:    1. S/P insertion of intrathecal pump (Primary)    2. Other chronic pain    3. Lumbar radiculopathy      --- Change pump settings to 1.37 mcg/day which is a 10% increase.   --- Follow-up 2 weeks or sooner if " needed.      Dictated utilizing Dragon dictation.

## 2021-09-03 ENCOUNTER — OFFICE VISIT (OUTPATIENT)
Dept: PAIN MEDICINE | Facility: CLINIC | Age: 76
End: 2021-09-03

## 2021-09-03 VITALS
DIASTOLIC BLOOD PRESSURE: 84 MMHG | WEIGHT: 191.8 LBS | SYSTOLIC BLOOD PRESSURE: 149 MMHG | BODY MASS INDEX: 26.85 KG/M2 | HEART RATE: 86 BPM | RESPIRATION RATE: 20 BRPM | OXYGEN SATURATION: 96 % | HEIGHT: 71 IN | TEMPERATURE: 97.3 F

## 2021-09-03 DIAGNOSIS — G89.29 OTHER CHRONIC PAIN: ICD-10-CM

## 2021-09-03 DIAGNOSIS — M48.061 SPINAL STENOSIS OF LUMBAR REGION, UNSPECIFIED WHETHER NEUROGENIC CLAUDICATION PRESENT: ICD-10-CM

## 2021-09-03 DIAGNOSIS — M54.16 LUMBAR RADICULOPATHY: ICD-10-CM

## 2021-09-03 DIAGNOSIS — Z98.890 S/P INSERTION OF INTRATHECAL PUMP: Primary | ICD-10-CM

## 2021-09-03 PROBLEM — Z96.89 SPINAL CORD STIMULATOR STATUS: Status: RESOLVED | Noted: 2018-05-01 | Resolved: 2021-09-03

## 2021-09-03 PROCEDURE — 99214 OFFICE O/P EST MOD 30 MIN: CPT | Performed by: NURSE PRACTITIONER

## 2021-09-03 PROCEDURE — 62368 ANALYZE SP INF PUMP W/REPROG: CPT | Performed by: NURSE PRACTITIONER

## 2021-09-03 RX ORDER — DEXAMETHASONE 0.5 MG/1
TABLET ORAL
COMMUNITY
Start: 2021-07-19 | End: 2023-02-20

## 2021-09-03 RX ORDER — MORPHINE SULFATE 30 MG/1
TABLET, FILM COATED, EXTENDED RELEASE ORAL
COMMUNITY
Start: 2021-08-23 | End: 2022-03-23

## 2021-09-03 RX ORDER — RIVASTIGMINE TARTRATE 6 MG/1
CAPSULE ORAL
COMMUNITY
Start: 2021-08-26 | End: 2023-02-20 | Stop reason: SDUPTHER

## 2021-09-03 RX ORDER — ATORVASTATIN CALCIUM 20 MG/1
TABLET, FILM COATED ORAL
COMMUNITY
Start: 2021-08-26 | End: 2023-02-20 | Stop reason: ALTCHOICE

## 2021-09-03 RX ORDER — LOSARTAN POTASSIUM 50 MG/1
50 TABLET ORAL DAILY
COMMUNITY
Start: 2021-07-08 | End: 2023-02-20

## 2021-09-03 RX ORDER — DULOXETIN HYDROCHLORIDE 60 MG/1
60 CAPSULE, DELAYED RELEASE ORAL
COMMUNITY

## 2021-09-03 RX ORDER — SPIRONOLACTONE 25 MG/1
25 TABLET ORAL DAILY
COMMUNITY
Start: 2021-08-19

## 2021-09-03 NOTE — PROGRESS NOTES
CHIEF COMPLAINT  F/u back pain. Pt here for pump refill.     Subjective   Saurav Cotto . is a 76 y.o. male  who presents for follow-up.  He has a history of back pain.  Today  His pain is 4/10VAS in severity. He states his pain was quite severe this morning when he first woke up.  He continues to work on weaning his MS Contin. He is currently taking 60 mg in the morning and 30 mg in the evening.  He will decrease to 30 mg BID in the next few weeks.     Patient remained masked during entire encounter. No cough present. I donned a mask and eye protection throughout entire visit. Prior to donning mask and eye protection, hand hygiene was performed, as well as when it was doffed.  I was closer than 6 feet, but not for an extended period of time. No obvious exposure to any bodily fluids.    Back Pain  This is a chronic problem. The current episode started more than 1 year ago. The problem has been gradually worsening since onset. The pain is present in the lumbar spine. The pain is at a severity of 4/10. The pain is moderate. Pertinent negatives include no abdominal pain, bladder incontinence, bowel incontinence, chest pain, dysuria, fever, headaches, numbness or weakness. Risk factors include lack of exercise and sedentary lifestyle. He has tried muscle relaxant for the symptoms. The treatment provided significant (pain pump with prialt therapy) relief.      PEG Assessment   What number best describes your pain on average in the past week?4  What number best describes how, during the past week, pain has interfered with your enjoyment of life?4  What number best describes how, during the past week, pain has interfered with your general activity?  4    The following portions of the patient's history were reviewed and updated as appropriate: allergies, current medications, past family history, past medical history, past social history, past surgical history and problem list.    Review of Systems   Constitutional:  "Negative for activity change, fatigue and fever.   HENT: Negative for congestion.    Eyes: Negative for visual disturbance.   Respiratory: Negative for cough and shortness of breath.    Cardiovascular: Negative for chest pain.   Gastrointestinal: Negative for abdominal pain, bowel incontinence, constipation and diarrhea.   Genitourinary: Negative for bladder incontinence, difficulty urinating and dysuria.   Musculoskeletal: Positive for back pain.   Neurological: Negative for dizziness, weakness, light-headedness, numbness and headaches.   Psychiatric/Behavioral: Negative for agitation, sleep disturbance and suicidal ideas. The patient is not nervous/anxious.      --  The aforementioned information the Chief Complaint section and above subjective data including any HPI data, and also the Review of Systems data, has been personally reviewed and affirmed.  --    Vitals:    09/03/21 1254   BP: 149/84   Pulse: 86   Resp: 20   Temp: 97.3 °F (36.3 °C)   SpO2: 96%   Weight: 87 kg (191 lb 12.8 oz)   Height: 180.3 cm (71\")   PainSc:   4   PainLoc: Back     Objective   Physical Exam  Vitals and nursing note reviewed.   Constitutional:       Appearance: Normal appearance. He is well-developed.   Eyes:      General: Lids are normal.   Cardiovascular:      Rate and Rhythm: Normal rate.   Pulmonary:      Effort: Pulmonary effort is normal.   Musculoskeletal:      Cervical back: Normal range of motion.      Lumbar back: Tenderness present. Decreased range of motion.   Skin:         Neurological:      Mental Status: He is alert and oriented to person, place, and time.   Psychiatric:         Attention and Perception: Attention normal.         Mood and Affect: Mood normal.         Speech: Speech normal.         Behavior: Behavior normal.         Judgment: Judgment normal.       Assessment/Plan   Diagnoses and all orders for this visit:    1. S/P insertion of intrathecal pump (Primary)    2. Other chronic pain    3. Spinal stenosis of " lumbar region, unspecified whether neurogenic claudication present    4. Lumbar radiculopathy      --- Change pump settings to 1.511 mcg/day which is a 10% increase.   --- Follow-up 3 weeks.  Discussed that as he weans his morphine if he needs to be seen sooner for a pump adjustment to let our office know.      WILDER REPORT    As the clinician, I personally reviewed the WILDER from 9/3/2021 while the patient was in the office today.     Dictated utilizing Dragon dictation.

## 2021-09-24 ENCOUNTER — OFFICE VISIT (OUTPATIENT)
Dept: PAIN MEDICINE | Facility: CLINIC | Age: 76
End: 2021-09-24

## 2021-09-24 VITALS
WEIGHT: 203.6 LBS | OXYGEN SATURATION: 96 % | SYSTOLIC BLOOD PRESSURE: 128 MMHG | DIASTOLIC BLOOD PRESSURE: 78 MMHG | BODY MASS INDEX: 28.5 KG/M2 | RESPIRATION RATE: 16 BRPM | HEIGHT: 71 IN | HEART RATE: 94 BPM | TEMPERATURE: 96.8 F

## 2021-09-24 DIAGNOSIS — G89.29 OTHER CHRONIC PAIN: Primary | ICD-10-CM

## 2021-09-24 DIAGNOSIS — Z96.89 SPINAL CORD STIMULATOR STATUS: ICD-10-CM

## 2021-09-24 DIAGNOSIS — M54.16 LUMBAR RADICULOPATHY: ICD-10-CM

## 2021-09-24 PROCEDURE — 99214 OFFICE O/P EST MOD 30 MIN: CPT | Performed by: NURSE PRACTITIONER

## 2021-09-24 NOTE — PROGRESS NOTES
CHIEF COMPLAINT  F/U for back pain. Pt states his pain has gotten worse .     Subjective   Saurav Cotto Sr. is a 76 y.o. male  who presents for follow-up.  He has a history of chronic back pain.    Today  His pain is 6/10VAS in severity. He continues to work on weaning his MS Contin. He is currently taking 30 mg bid.  Pain significantly worse as medication has been weaned    IT prialt pump implanted 7/16/2021.  Goal to gradually titrate Prialt up while weaning from MS Contin. Patient did experience some side effects during the trial, therefore gradual/conservative titration of the Prialt has been done up to this point. Trial was with 5 mcg bolus.   Current dose is 1.51 mcg/day Prialt. Overall he feels that his pain is poorly controlled at this time.      Patient remained masked during entire encounter. No cough present. I donned a mask and eye protection throughout entire visit. Prior to donning mask and eye protection, hand hygiene was performed, as well as when it was doffed.  I was closer than 6 feet, but not for an extended period of time. No obvious exposure to any bodily fluids.    Back Pain  This is a chronic problem. The current episode started more than 1 year ago. The problem occurs daily. The problem has been waxing and waning since onset. The pain is present in the lumbar spine. The quality of the pain is described as aching and burning. The pain is at a severity of 6/10. Associated symptoms include headaches. Pertinent negatives include no abdominal pain, dysuria, fever, numbness or weakness.     PEG Assessment   What number best describes your pain on average in the past week?7  What number best describes how, during the past week, pain has interfered with your enjoyment of life?7  What number best describes how, during the past week, pain has interfered with your general activity?  7    The following portions of the patient's history were reviewed and updated as appropriate: allergies, current  "medications, past family history, past medical history, past social history, past surgical history and problem list.    Review of Systems   Constitutional: Negative for activity change, fatigue and fever.   HENT: Negative for congestion.    Eyes: Negative for visual disturbance.   Respiratory: Negative for cough and chest tightness.    Gastrointestinal: Positive for constipation. Negative for abdominal pain and diarrhea.   Genitourinary: Negative for difficulty urinating and dysuria.   Musculoskeletal: Positive for back pain.   Neurological: Positive for light-headedness and headaches. Negative for dizziness, weakness and numbness.   Psychiatric/Behavioral: Negative for agitation, sleep disturbance and suicidal ideas. The patient is not nervous/anxious.      I have reviewed and confirmed the accuracy of the ROS as documented by the MA/LPN/RN ABDULKADIR Rowe    Vitals:    09/24/21 1404   BP: 128/78   Pulse: 94   Resp: 16   Temp: 96.8 °F (36 °C)   TempSrc: Temporal   SpO2: 96%   Weight: 92.4 kg (203 lb 9.6 oz)   Height: 180.3 cm (71\")   PainSc:   6   PainLoc: Back       Objective   Physical Exam  Vitals and nursing note reviewed.   Constitutional:       General: He is not in acute distress.     Appearance: Normal appearance. He is not ill-appearing.   Pulmonary:      Effort: Pulmonary effort is normal. No respiratory distress.   Musculoskeletal:      Lumbar back: Tenderness and bony tenderness present.   Skin:     General: Skin is warm and dry.   Neurological:      Mental Status: He is alert and oriented to person, place, and time.      Motor: Motor function is intact.      Gait: Gait normal.   Psychiatric:         Mood and Affect: Mood normal.         Behavior: Behavior normal.       Assessment/Plan   Diagnoses and all orders for this visit:    1. Other chronic pain (Primary)    2. Lumbar radiculopathy    3. Spinal cord stimulator status      --- Discussed plan with Dr. Brian. Will increase IT Prialt dose " to 3 mcg/day.    --- He will continue working to wean from his oral pain medications. I have commended his efforts so far.    --- Follow-up 1 week/sooner if needed. Also discussed, if improved significantly we could postpone f/u visit some.           WILDER REPORT  As the clinician, I personally reviewed the WILDER from 9/24/2021 while the patient was in the office today.    Dictated utilizing Dragon dictation.

## 2021-09-28 ENCOUNTER — OFFICE VISIT (OUTPATIENT)
Dept: PAIN MEDICINE | Facility: CLINIC | Age: 76
End: 2021-09-28

## 2021-09-28 ENCOUNTER — TELEPHONE (OUTPATIENT)
Dept: PAIN MEDICINE | Facility: CLINIC | Age: 76
End: 2021-09-28

## 2021-09-28 VITALS
SYSTOLIC BLOOD PRESSURE: 135 MMHG | HEART RATE: 86 BPM | WEIGHT: 200.2 LBS | OXYGEN SATURATION: 94 % | BODY MASS INDEX: 28.03 KG/M2 | RESPIRATION RATE: 16 BRPM | HEIGHT: 71 IN | DIASTOLIC BLOOD PRESSURE: 83 MMHG | TEMPERATURE: 97.5 F

## 2021-09-28 DIAGNOSIS — G89.29 OTHER CHRONIC PAIN: Primary | ICD-10-CM

## 2021-09-28 DIAGNOSIS — M54.16 LUMBAR RADICULOPATHY: ICD-10-CM

## 2021-09-28 DIAGNOSIS — Z97.8 PRESENCE OF INTRATHECAL PUMP: ICD-10-CM

## 2021-09-28 PROCEDURE — 99214 OFFICE O/P EST MOD 30 MIN: CPT | Performed by: NURSE PRACTITIONER

## 2021-09-28 NOTE — TELEPHONE ENCOUNTER
Pt called stg he has been feeling very weak from the waist down, difficulty walking and standing ever since last Sunday. His pump was increased on 9/24/21 to 3mcg/day. He sts he has been feeling this way x past 2 days. He sts improved pain since the increase, however is very weak and has no strength when walking or standing. He denies dizziness, light headedness, loss of bowel or bladder, not hearing any music. He sts his wife has an appt on this side of town and is available to come in around 3pm today to decrease the pump settings. Can he stop by? Please advise. Thank you.

## 2021-09-28 NOTE — TELEPHONE ENCOUNTER
Spoke with you. Leann added him to your schedule today.  Called pt back, he will be here around 3pm.

## 2021-09-28 NOTE — PROGRESS NOTES
"CHIEF COMPLAINT  F/U back pain- patient states that since the changes in his pump rate he has been weak from his waist down and his vision is blurred.     Subjective   Saurav Cotto Sr. is a 76 y.o. male  who presents for follow-up.  He has a history of chronic back pain.    Today  His pain is 5/10VAS in severity (improved marginally from the previous office visit). He continues to work on weaning his MS Contin. He is currently taking 30 mg bid.  Pain significantly worse as medication has been weaned     IT prialt pump implanted 7/16/2021.  Goal to gradually titrate Prialt up while weaning from MS Contin. Patient did experience some side effects during the trial, therefore gradual/conservative titration of the Prialt has been done up to this point. Trial was with 5 mcg bolus.   Current dose is 3 mcg/day Prialt, dose was doubled at the previous office visit 5 days ago.  Unfortunately he has not tolerated this. Reporting \"funny\" vision that is a bit blurred and also some weakness of the bilateral LE's.  Symptoms started about 3 days after pump increase.      Patient remained masked during entire encounter. No cough present. I donned a mask and eye protection throughout entire visit. Prior to donning mask and eye protection, hand hygiene was performed, as well as when it was doffed.  I was closer than 6 feet, but not for an extended period of time. No obvious exposure to any bodily fluids.    Back Pain  This is a chronic problem. The current episode started more than 1 year ago. The problem occurs daily. The problem has been waxing and waning since onset. The pain is present in the lumbar spine. The quality of the pain is described as aching and burning. The pain is at a severity of 5/10. Associated symptoms include weakness. Pertinent negatives include no abdominal pain, chest pain, dysuria, fever, headaches or numbness.     PEG Assessment   What number best describes your pain on average in the past week?5  What " "number best describes how, during the past week, pain has interfered with your enjoyment of life?8  What number best describes how, during the past week, pain has interfered with your general activity?  8    The following portions of the patient's history were reviewed and updated as appropriate: allergies, current medications, past family history, past medical history, past social history, past surgical history and problem list.    Review of Systems   Constitutional: Positive for fatigue. Negative for activity change, chills and fever.   HENT: Negative for congestion.    Eyes: Positive for visual disturbance.   Respiratory: Negative for chest tightness and shortness of breath.    Cardiovascular: Negative for chest pain.   Gastrointestinal: Positive for constipation. Negative for abdominal pain and diarrhea.   Genitourinary: Negative for difficulty urinating and dysuria.   Musculoskeletal: Positive for back pain.   Neurological: Positive for weakness and light-headedness. Negative for dizziness, numbness and headaches.   Psychiatric/Behavioral: Negative for agitation, self-injury, sleep disturbance and suicidal ideas. The patient is not nervous/anxious.      I have reviewed and confirmed the accuracy of the ROS as documented by the MA/LPN/RN ABDULKADIR Rowe    Vitals:    09/28/21 1455   BP: 135/83   Pulse: 86   Resp: 16   Temp: 97.5 °F (36.4 °C)   SpO2: 94%   Weight: 90.8 kg (200 lb 3.2 oz)   Height: 180.3 cm (71\")   PainSc:   5   PainLoc: Back     Objective   Physical Exam  Vitals and nursing note reviewed.   Constitutional:       General: He is not in acute distress.     Appearance: Normal appearance. He is not ill-appearing.   Pulmonary:      Effort: Pulmonary effort is normal. No respiratory distress.   Skin:     General: Skin is warm and dry.   Neurological:      Mental Status: He is alert and oriented to person, place, and time.      Motor: Motor function is intact.      Gait: Gait normal. "   Psychiatric:         Mood and Affect: Mood normal.         Behavior: Behavior normal.       Assessment/Plan   Diagnoses and all orders for this visit:    1. Other chronic pain (Primary)    2. Lumbar radiculopathy    3. Presence of intrathecal pump      --- Discussed case with Dr. Brian   --- Decrease dose of IT Prialt to 2.25 mcg/hr as discussed with Dr. Brian  --- If adverse reactions persist and/or we are unable to achieve reasonable pain relief, we may have to consider changing to IT Morphine in the future.    --- Follow-up 5-7 days       WILDER REPORT    As the clinician, I personally reviewed the WILDER from 9/28/2021 while the patient was in the office today.

## 2021-10-01 ENCOUNTER — TELEPHONE (OUTPATIENT)
Dept: PAIN MEDICINE | Facility: CLINIC | Age: 76
End: 2021-10-01

## 2021-10-23 NOTE — PROGRESS NOTES
"CHIEF COMPLAINT  F/u back pain. Pt sts pain has worsened x a year ago. Pt has SCS, sts not working to control his pain, would like to discuss pain pump implant.     Subjective   Saurav Cotto Sr. is a 75 y.o. male  who presents for follow-up.  He has a history of chronic low back pain. Reports his pain is worse since last evaluation.    Patient was last evaluated the office on 6/6/2018.  At that time he was status post Neema SCS implant on 4/20/2018 with Dr. Brian.  This was to help control his low back and leg pain.  Is reporting 95% relief with the SCS.  Was previously having issues with seroma but this has resolved. Reprogramming with BS SCS as needed. Follow up as needed. He was initially referred here by Dr Zepeda after successful SCS trial in 2018.      \"My pain in my back has gotten so much worse that my stimulator is not helping as much as it used to.\"    Reports his pain has worsened over the past year. He and his wife also reports he has had multiple falls. \"every time he fell, he fell onto his back side.\"  His friend recently got a pain pump is doing well with this.  \"all he does is sit in the house.\"     Complains of pain in his low back. His wife states legs are weak. \"I have strength in my legs.\"  Today his pain is 4/10VAS. Describes his back pain as continuous aching with intermittent sharp pain. Pain increases with prolonged position(sitting/laying/standing); pain decreases with changing position.  Prescribed pain medication from his PCP. \"He's been on pain pills for a long time.\" Continues with Ms Contin 60 mg BID and Percocet 5/325 1-2/day PRN. ADL's by self. Denies any bowel or bladder incontinence.       Previously had epidurals with no lasting relief.    Patient remained masked during entire encounter. No cough present. I donned a mask and eye protection throughout entire visit. Prior to donning mask and eye protection, hand hygiene was performed, as well as when it was doffed. "  I was closer than 6 feet, but not for an extended period of time. No obvious exposure to any bodily fluids.    Back Pain  This is a chronic problem. The current episode started more than 1 year ago. The problem has been gradually worsening (worse since last evaluation) since onset. The pain is at a severity of 4/10. The pain is moderate. The pain is worse during the day. Pertinent negatives include no bladder incontinence, bowel incontinence, chest pain, dysuria, fever, headaches, numbness or weakness. Risk factors include lack of exercise and sedentary lifestyle. He has tried muscle relaxant for the symptoms.            Result Narrative   RADIOLOGY REPORT    FACILITY:  [facility]  UNIT/AGE/GENDER: HAN  OP      AGE:69 Y          SEX:M  PATIENT NAME/:  ESTEE MAYO    1945  UNIT NUMBER:  [id number]  ACCOUNT NUMBER:  [id number]  ACCESSION NUMBER:  [id number]  [id number]    Lumbar myelogram  CT lumbar spine post myelogram  Lumbar puncture under fluoroscopy    Examination Date: 2014    Clinical History:  Low back pain    Fluoroscopy time: 1.7 minutes     Lumbar myelogram:  FINDINGS: After informed consent was obtained patient's placed prone on the examination table. Lower back was scanned with fluoroscopy. The L4 interspace was chosen as entry site. Lower back was then prepped and draped in the usual sterile fashion. 1%   Lidocaine was used for local anesthesia. A 22-gauge, 3-1/2 inches Toni spinal needle was advanced into the thecal sac. Clear CSF was returned. 15 ml of Isovue 200 were administered. Images were then obtained.    Images of the lumbar spine reveal no abnormal motion is identified in flexion or extension. Contrast is noted in the lumbar thecal sac. Degenerative scoliosis and multilevel degenerative spondylosis is identified. Spinal stenosis appears greatest at   L2-L3 and L3-L4 on plain comes.      CT Lumbar Spine:  Technique:  Helical CT images of the  lumbar spine are provided after the administration of intrathecal contrast.  Axial, sagittal and coronal images are provided.    FINDINGS: Multilevel degenerative spondylosis is identified. Mild scoliosis and right lateral listhesis of L2 on L3. Straightening of the lumbar spine is present. No vertebral body fracture or pars defect is present. The conus medullaris terminates at   L1-L2. Lower thoracic cord is normal. No abnormal paravertebral masses identified. There is atherosclerosis within the aorta which is mild to moderate.    T12-L1: Tiny osteophyte formation is noted. There is no spinal or foraminal stenosis.    L1-L2: Small disc protrusion is present flattening the thecal sac. The foramina are patent.    L2-L3: Moderate degenerative disc disease is noted with partial collapse of the disc and vacuum phenomenon. This is greater along the left half of the disc. Left lateral osteophyte formation is noted and projects into the neural foramen. Facet   arthropathy is also present and results in moderate left-sided foraminal stenosis. And put osteophytes abut the exiting left L2 nerve root. Moderate left lateral recess and spinal stenosis is identified. Milder right-sided foraminal narrowing is noted.    L3-L4: Moderate right-sided degenerative disc disease is present with partial collapse of the disc and vacuum phenomenon. Endplate sclerosis and erosion is noted. There is moderate to severe right-sided facet arthropathy. Degenerative changes from the   endplates and facet joint result in moderate right-sided foraminal stenosis and mild impingement of the right L3 nerve root. There is also moderate spinal stenosis and right lateral recess narrowing. Milder left foraminal narrowing is identified.    L4-L5: Moderate degenerative disc narrowing is present with vacuum phenomenon. Partial collapse of the right-sided the disc is noted. Right-sided facet arthropathy and endplate osteophytes also cause moderate right-sided  foraminal stenosis. These abut   the exiting right L4 nerve root. Milder left-sided foraminal narrowing is identified.    L5-S1: Moderate degenerative disc disease is present with vacuum phenomenon. Small endplate osteophytes project into the neural foramina. Facet arthropathy is also identified. Moderate bilateral foraminal stenosis is present in greater on the left side.   End plate osteophyte abut the exiting left L5 nerve root.     IMPRESSION:  1. Moderate multilevel degenerative spondylosis and mild degenerative scoliosis of the lumbar spine.    2. Degenerative disc disease and facet arthropathy result in moderate left-sided canal and foraminal stenosis at L2-L3 and right-sided canal and foraminal stenosis at L3-L4 and L4-L5. Mild nerve root impingement is identified.    Dictated by: Santiago Barraza M.D.    Images and Report reviewed and interpreted by: Santiago Barraza M.D.    <PS><Electronically signed by: Santiago Barraza M.D.>  07/01/2014 1024    D: 07/01/2014 1016  T: 07/01/2014 1016    PEG Assessment   What number best describes your pain on average in the past week?4  What number best describes how, during the past week, pain has interfered with your enjoyment of life?4  What number best describes how, during the past week, pain has interfered with your general activity?  4    The following portions of the patient's history were reviewed and updated as appropriate: allergies, current medications, past family history, past medical history, past social history, past surgical history and problem list.    Review of Systems   Constitutional: Negative for activity change, fatigue and fever.   HENT: Negative for congestion.    Eyes: Negative for visual disturbance.   Respiratory: Negative for cough and shortness of breath.    Cardiovascular: Negative for chest pain.   Gastrointestinal: Negative for bowel incontinence, constipation and diarrhea.   Endocrine: Negative for polyuria.   Genitourinary: Negative for  "bladder incontinence, difficulty urinating and dysuria.   Musculoskeletal: Positive for back pain (scs).   Allergic/Immunologic: Negative for immunocompromised state.   Neurological: Negative for dizziness, weakness, light-headedness, numbness and headaches.   Psychiatric/Behavioral: Negative for agitation, sleep disturbance and suicidal ideas. The patient is not nervous/anxious.      I have reviewed and confirmed the accuracy of the ROS as documented by the MA/LPN/RN ABDULKADIR Parson      Vitals:    02/01/21 1404   BP: 133/80   Pulse: 104   Resp: 20   Temp: 97.7 °F (36.5 °C)   SpO2: 95%   Weight: 90.7 kg (200 lb)   Height: 179.1 cm (70.5\")   PainSc:   4   PainLoc: Back       Objective   Physical Exam  Vitals signs and nursing note reviewed.   Constitutional:       Appearance: Normal appearance. He is well-developed.   HENT:      Head: Normocephalic and atraumatic.   Eyes:      General: Lids are normal.   Musculoskeletal:      Lumbar back: He exhibits decreased range of motion, tenderness and bony tenderness.   Skin:     General: Skin is warm and dry.   Neurological:      Mental Status: He is alert.      Gait: Gait abnormal.      Deep Tendon Reflexes:      Reflex Scores:       Patellar reflexes are 1+ on the right side and 1+ on the left side.  Psychiatric:         Speech: Speech normal.         Behavior: Behavior normal. Behavior is cooperative.         Thought Content: Thought content normal.         Judgment: Judgment normal.         Assessment/Plan   Diagnoses and all orders for this visit:    1. Other chronic pain (Primary)  -     Ambulatory Referral to Psychology    2. Spinal stenosis of lumbar region, unspecified whether neurogenic claudication present  -     XR Spine Lumbar 2 or 3 View; Future  -     CT Lumbar Spine Without Contrast; Future  -     Ambulatory Referral to Psychology    3. Lumbar radiculopathy  -     XR Spine Lumbar 2 or 3 View; Future  -     CT Lumbar Spine Without Contrast; Future  -   "   Ambulatory Referral to Psychology    4. Spinal cord stimulator status  -     XR Spine Thoracic 3 View; Future  -     Ambulatory Referral to Psychology    5. Mid back pain  -     XR Spine Thoracic 3 View; Future        Discussed with Dr Brian. Need updated imaging and appropriate protocols before proceeding with IT pump implant.     --- XR thoracic and Lumbar spine to be done today.  --- Ct- Lumbar-- to be scheduled  --- Reprogramming with BS SCS. Patient given information to contact.  --- Psychological evaluation for consideration intrathecal pump.  --- [employer] and Prialt pump hand out given.   --- Follow-up after imaging and Dr Appiah evaluation.           WILDER REPORT  WILDER report has been reviewed and scanned into the patient's chart.    As the clinician, I personally reviewed the WILDER from 2-1-21 while the patient was in the office today.            EMR Dragon/Transcription disclaimer:   Much of this encounter note is an electronic transcription/translation of spoken language to printed text. The electronic translation of spoken language may permit erroneous, or at times, nonsensical words or phrases to be inadvertently transcribed; Although I have reviewed the note for such errors, some may still exist.     Patient/Caregiver provided printed discharge information.

## 2021-10-26 ENCOUNTER — OFFICE VISIT (OUTPATIENT)
Dept: PAIN MEDICINE | Facility: CLINIC | Age: 76
End: 2021-10-26

## 2021-10-26 VITALS
WEIGHT: 206 LBS | SYSTOLIC BLOOD PRESSURE: 154 MMHG | OXYGEN SATURATION: 97 % | BODY MASS INDEX: 28.84 KG/M2 | HEIGHT: 71 IN | HEART RATE: 87 BPM | DIASTOLIC BLOOD PRESSURE: 83 MMHG | TEMPERATURE: 96.4 F | RESPIRATION RATE: 20 BRPM

## 2021-10-26 DIAGNOSIS — Z97.8 PRESENCE OF INTRATHECAL PUMP: Primary | ICD-10-CM

## 2021-10-26 DIAGNOSIS — G89.29 OTHER CHRONIC PAIN: ICD-10-CM

## 2021-10-26 DIAGNOSIS — M48.061 SPINAL STENOSIS OF LUMBAR REGION, UNSPECIFIED WHETHER NEUROGENIC CLAUDICATION PRESENT: ICD-10-CM

## 2021-10-26 DIAGNOSIS — M54.16 LUMBAR RADICULOPATHY: ICD-10-CM

## 2021-10-26 PROCEDURE — 99214 OFFICE O/P EST MOD 30 MIN: CPT | Performed by: NURSE PRACTITIONER

## 2021-10-26 PROCEDURE — 62369 ANAL SP INF PMP W/REPRG&FILL: CPT | Performed by: NURSE PRACTITIONER

## 2021-10-26 RX ORDER — TACROLIMUS 1 MG/1
1 CAPSULE ORAL
COMMUNITY
Start: 2021-09-27

## 2021-10-26 RX ORDER — MEMANTINE HYDROCHLORIDE 28 MG/1
1 CAPSULE, EXTENDED RELEASE ORAL DAILY
COMMUNITY
Start: 2021-09-12 | End: 2023-02-21 | Stop reason: SDUPTHER

## 2021-10-26 RX ORDER — LOSARTAN POTASSIUM 100 MG/1
TABLET ORAL
COMMUNITY
Start: 2021-09-08 | End: 2022-03-23

## 2021-10-26 RX ORDER — MORPHINE SULFATE 15 MG/1
TABLET, FILM COATED, EXTENDED RELEASE ORAL
COMMUNITY
Start: 2021-10-18 | End: 2022-03-23

## 2021-10-26 NOTE — PROGRESS NOTES
CHIEF COMPLAINT  F/u back pain. Pt sts pain is the same. Pt would like to discuss adjusting pain pump settings today.     Subjective   Saurav Cotto Sr. is a 76 y.o. male  who presents for follow-up.  He has a history of back pain.  He is maintained on intrathecal pain pump with a current dose of Prial 2.25 mcg/day. Today his pain is 4/10VAS in severity.     He continues to wean his MS Contin and is down to 30 mg at night and 15 mg in the morning.  He will seem to progress to 15 mg twice daily x28 days then 15 mg daily with goal of stopping this medication.  This is being weaned by his primary care physician.    With the adjustment of his Prialt from 3 mcg/day down to 2.25 mcg/day his side effects resolved and he maintained pain relief.  He asks to slightly increase his preop dose today in anticipation of continuing to wean his MS Contin    Patient remained masked during entire encounter. No cough present. I donned a mask and eye protection throughout entire visit. Prior to donning mask and eye protection, hand hygiene was performed, as well as when it was doffed.  I was closer than 6 feet, but not for an extended period of time. No obvious exposure to any bodily fluids.    Back Pain  This is a chronic problem. The current episode started more than 1 year ago. The problem occurs daily. The problem has been waxing and waning since onset. The pain is present in the lumbar spine. The quality of the pain is described as aching and burning. The pain is at a severity of 4/10. The pain is moderate. Pertinent negatives include no abdominal pain, bladder incontinence, bowel incontinence, chest pain, dysuria, fever, headaches, numbness or weakness. Risk factors include lack of exercise and sedentary lifestyle. He has tried muscle relaxant for the symptoms. The treatment provided significant (pain pump with prialt therapy) relief.     PEG Assessment   What number best describes your pain on average in the past week?4  What  "number best describes how, during the past week, pain has interfered with your enjoyment of life?4  What number best describes how, during the past week, pain has interfered with your general activity?  4    The following portions of the patient's history were reviewed and updated as appropriate: allergies, current medications, past family history, past medical history, past social history, past surgical history and problem list.    Review of Systems   Constitutional: Negative for activity change, fatigue and fever.   HENT: Negative for congestion.    Eyes: Negative for visual disturbance.   Respiratory: Negative for cough and shortness of breath.    Cardiovascular: Negative for chest pain.   Gastrointestinal: Negative for abdominal pain, bowel incontinence, constipation and diarrhea.   Endocrine: Negative for polyuria.   Genitourinary: Negative for bladder incontinence, difficulty urinating and dysuria.   Musculoskeletal: Positive for back pain (pain pump) and gait problem (cane).   Neurological: Negative for dizziness, weakness, light-headedness, numbness and headaches.   Psychiatric/Behavioral: Negative for agitation, sleep disturbance and suicidal ideas. The patient is not nervous/anxious.      --  The aforementioned information the Chief Complaint section and above subjective data including any HPI data, and also the Review of Systems data, has been personally reviewed and affirmed.  --    Office visit from 9/24/2020, ABDULKADIR Rowe reviewed.  At this time in consultation with Dr. Brian his Prialt dose was adjusted from 1.5 mcg/day to 3 mcg /day with plans to follow-up in 1 week or sooner if needed.      Office visit from 9/28/2021 with ABDULKADIR Rowe reviewed.  Patient reported \"funny\" vision that is a bit blurred and some weakness of his bilateral lower extremities.  The symptoms started about 3 days after his pump increase.  At this time in conjunction with Dr. Brian his Prialt was " "decreased to 2.25 mcg/day.    Vitals:    10/26/21 1345   BP: 154/83   Pulse: 87   Resp: 20   Temp: 96.4 °F (35.8 °C)   SpO2: 97%   Weight: 93.4 kg (206 lb)   Height: 180.3 cm (71\")   PainSc:   4   PainLoc: Back     Objective   Physical Exam  Vitals and nursing note reviewed.   Constitutional:       Appearance: Normal appearance. He is well-developed.   Eyes:      General: Lids are normal.   Cardiovascular:      Rate and Rhythm: Normal rate.   Pulmonary:      Effort: Pulmonary effort is normal.   Musculoskeletal:      Cervical back: Normal range of motion.      Lumbar back: Decreased range of motion.   Neurological:      Mental Status: He is alert and oriented to person, place, and time.   Psychiatric:         Speech: Speech normal.         Behavior: Behavior normal.         Judgment: Judgment normal.       Assessment/Plan   Diagnoses and all orders for this visit:    1. Presence of intrathecal pump (Primary)    2. Spinal stenosis of lumbar region, unspecified whether neurogenic claudication present    3. Lumbar radiculopathy    4. Other chronic pain      --- Intrathecal pump was interrogated in office today. Results are in chart.    The patient is currently at a dose of 2.25 mcg of Prialt per day.      Under sterile technique, the pump was accessed using a [employer] proprietary refill kit. The volume was withdrawn from the pump. The expected residual volume of the pump was 14.9 ml. The actual residual volume of the pump was 14.9 ml.    The pump was then refilled with 20 ml of Prialt at a concentration of 25 mcg/ml. The pump was set to increased to 2.501 mcg/day.      Patient tolerated procedure well. Minimal bleeding was noted. Pump site remains intact with no evidence of erythema or drainage.    Patient will return for pump refill when due or sooner if needed.     CADY is March 2028 months.    Pump refill day 4/23/22    --- Follow-up 1 week, if doing well at this dose may cancel this appt and return for pump refill " appt scheduled on 1/14/2022.    Dictated utilizing Dragon dictation.

## 2021-11-01 ENCOUNTER — TELEPHONE (OUTPATIENT)
Dept: PAIN MEDICINE | Facility: CLINIC | Age: 76
End: 2021-11-01

## 2021-11-01 NOTE — TELEPHONE ENCOUNTER
Called and spoke to pt. Pain pump was increased to 2.501 mcg/day on 10/26/21. He sts 3 days ago his joints in his hands, shoulders and elbows have been aching since and wanted to know if this was a result of the pump increase or him weaning off morphine pills? Please advise. Thank you.

## 2021-11-01 NOTE — TELEPHONE ENCOUNTER
Caller: ESTEE MAYO    Relationship to patient: SELF    Best call back number: [phone]    Patient is needing: HE IS HAVING PROBLEMS WITH HIS JOINTS AND WANTED TO KNOW IF THAT WAS A SIDE EFFECT OF THE PAIN PUMP. MARKED URGENT BECAUSE PATIENT NEEDS TO SPEAK TO CLINICAL TODAY AND NEEDED TO CANCEL HIS APPOINTMENT FOR TOMORROW.

## 2021-11-02 NOTE — TELEPHONE ENCOUNTER
Called and informed pt of above msg. He verbalized understanding. He wonders if he should continue to wean off morphine pills if he currently has aching joints. Told him to call his PCP since they prescribe morphine pills to see what they recommend. He will call pcp today.

## 2022-01-25 ENCOUNTER — OFFICE VISIT (OUTPATIENT)
Dept: PAIN MEDICINE | Facility: CLINIC | Age: 77
End: 2022-01-25

## 2022-01-25 VITALS
OXYGEN SATURATION: 96 % | HEIGHT: 71 IN | HEART RATE: 97 BPM | BODY MASS INDEX: 28.98 KG/M2 | WEIGHT: 207 LBS | SYSTOLIC BLOOD PRESSURE: 161 MMHG | TEMPERATURE: 96.9 F | DIASTOLIC BLOOD PRESSURE: 94 MMHG

## 2022-01-25 DIAGNOSIS — Z97.8 PRESENCE OF INTRATHECAL PUMP: ICD-10-CM

## 2022-01-25 DIAGNOSIS — M48.061 SPINAL STENOSIS OF LUMBAR REGION, UNSPECIFIED WHETHER NEUROGENIC CLAUDICATION PRESENT: ICD-10-CM

## 2022-01-25 DIAGNOSIS — M54.16 LUMBAR RADICULOPATHY: ICD-10-CM

## 2022-01-25 DIAGNOSIS — G89.29 OTHER CHRONIC PAIN: Primary | ICD-10-CM

## 2022-01-25 PROCEDURE — 99214 OFFICE O/P EST MOD 30 MIN: CPT | Performed by: NURSE PRACTITIONER

## 2022-01-25 PROCEDURE — 62369 ANAL SP INF PMP W/REPRG&FILL: CPT | Performed by: NURSE PRACTITIONER

## 2022-01-25 NOTE — PROGRESS NOTES
"CHIEF COMPLAINT  F/U back pain- patient states that his pain has remained the same.     Subjective   Saurav Cotto . is a 76 y.o. male  who presents for follow-up.  He has a history of back pain.    He has been unable to tolerate weaning of oral Morphine (managed by his pcp). Has gone back up to previous dose of 60 mg bid (MSContin).  States he was just \"too achy all over\".    F/u for IT Prialt pump refill. Current dose 2.5 mcg/day. Denies any side effects at this dose. Did experience some dizziness, blurred vision, and weakness at higher doses.  He reports that he feels good overall, other than when he first wakes up in the morning. That is when his pain is most severe and he feels that he could use extra medication.       Patient remained masked during entire encounter. No cough present. I donned a mask and eye protection throughout entire visit. Prior to donning mask and eye protection, hand hygiene was performed, as well as when it was doffed.  I was closer than 6 feet, but not for an extended period of time. No obvious exposure to any bodily fluids.    Back Pain  This is a chronic problem. The current episode started more than 1 year ago. The problem occurs daily. The problem has been waxing and waning since onset. The pain is present in the lumbar spine. The quality of the pain is described as aching and burning. The pain is at a severity of 0/10. Associated symptoms include weakness. Pertinent negatives include no abdominal pain, chest pain, dysuria, fever, headaches or numbness.      PEG Assessment   What number best describes your pain on average in the past week?5  What number best describes how, during the past week, pain has interfered with your enjoyment of life?5  What number best describes how, during the past week, pain has interfered with your general activity?  5    The following portions of the patient's history were reviewed and updated as appropriate: allergies, current medications, past " "family history, past medical history, past social history, past surgical history and problem list.    Review of Systems   Constitutional: Positive for fatigue. Negative for activity change, chills and fever.   HENT: Negative for congestion.    Eyes: Negative for visual disturbance.   Respiratory: Negative for chest tightness and shortness of breath.    Cardiovascular: Negative for chest pain.   Gastrointestinal: Positive for constipation and diarrhea. Negative for abdominal pain.   Genitourinary: Negative for difficulty urinating and dysuria.   Musculoskeletal: Positive for back pain.   Neurological: Positive for weakness. Negative for dizziness, light-headedness, numbness and headaches.   Psychiatric/Behavioral: Positive for sleep disturbance. Negative for agitation, self-injury and suicidal ideas. The patient is not nervous/anxious.      I have reviewed and confirmed the accuracy of the ROS as documented by the MA/LPN/RN ABDULKADIR Rowe    Vitals:    01/25/22 1435   BP: 161/94   Pulse: 97   Temp: 96.9 °F (36.1 °C)   SpO2: 96%   Weight: 93.9 kg (207 lb)   Height: 180.3 cm (71\")   PainSc: 0-No pain     Objective   Physical Exam  Vitals and nursing note reviewed.   Constitutional:       General: He is not in acute distress.     Appearance: Normal appearance. He is not ill-appearing.   Pulmonary:      Effort: Pulmonary effort is normal. No respiratory distress.   Musculoskeletal:      Lumbar back: Tenderness present.   Skin:     General: Skin is warm and dry.   Neurological:      Mental Status: He is alert and oriented to person, place, and time.      Motor: Motor function is intact.      Gait: Gait normal.   Psychiatric:         Mood and Affect: Mood normal.         Behavior: Behavior normal.         Intrathecal pump was interrogated in office today. Results are in chart.    The patient is currently at a dose of 2.5 mcg of Prialt per day.     Under sterile technique, the pump was accessed using a [employer] " proprietary refill kit. The volume was withdrawn from the pump. The expected residual volume of the pump was 10.9 ml. The actual residual volume of the pump was 11 ml.    The pump was then refilled with 20 ml of Prialt at a concentration of 25 mcg/ml.  The pump was set at a rate of 2.75 mcg/day.       Patient tolerated procedure well. Minimal bleeding was noted. Pump site remains intact with no evidence of erythema or drainage.    Patient will return for pump refill when due or sooner if needed.     CADY is 75 months.    Pump refill date is 7/7/2022.    Assessment/Plan   Diagnoses and all orders for this visit:    1. Other chronic pain (Primary)    2. Presence of intrathecal pump    3. Lumbar radiculopathy    4. Spinal stenosis of lumbar region, unspecified whether neurogenic claudication present      --- Increase Prialt rate to 2.75 mcg/day  --- Order PTM from device rep. Will consider addition of AM bolus if needed at next follow up  --- Follow-up for next pump refill          As the clinician, I personally reviewed the WILDER from 1/26/2021 while the patient was in the office today.     Dictated utilizing Dragon dictation.     This document is intended for medical expert use only. Reading of this document by patients and/or patient's family without participating medical staff guidance may result in misinterpretation and unintended morbidity.   Any interpretation of such data is the responsibility of the patient and/or family member responsible for the patient in concert with their primary or specialist providers, not to be left for sources of online searches such as [employer], [employer] or similar queries. Relying on these approaches to knowledge may result in misinterpretation, misguided goals of care and even death should patients or family members try recommendations outside of the realm of professional medical care in a supervised way.

## 2022-01-28 ENCOUNTER — TELEPHONE (OUTPATIENT)
Dept: PAIN MEDICINE | Facility: CLINIC | Age: 77
End: 2022-01-28

## 2022-01-28 DIAGNOSIS — M79.10 GENERALIZED MUSCLE ACHE: Primary | ICD-10-CM

## 2022-01-28 NOTE — TELEPHONE ENCOUNTER
Caller: ESTEE MAYO SR    Relationship to patient: SELF    Best call back number: [phone]    Patient is needing: PATIENT REQ TO SPEAK WITH THEE REGARDING INFO HE HAS ON BLOODWORK THEY HAD SPOKEN ABOUT    HUB ATTEMPTED TO WARM TRANSFER

## 2022-01-28 NOTE — TELEPHONE ENCOUNTER
Phoned [facility] to get fax number to fax orders for labs had to leave voice message

## 2022-03-14 ENCOUNTER — TELEPHONE (OUTPATIENT)
Dept: PAIN MEDICINE | Facility: CLINIC | Age: 77
End: 2022-03-14

## 2022-03-14 NOTE — TELEPHONE ENCOUNTER
Provider: THEE PANCHAL  Caller:  ESTEE MAYO SR  Relationship to Patient: SELF  Pharmacy: [facility] [phone]  Phone Number: [phone]  Reason for Call:  PATIENT HAS A PAIN PUMP. BEEN HAVING INCREASED BACK PAIN AND WANTS TO SPEAK WITH THE APRN.

## 2022-03-14 NOTE — TELEPHONE ENCOUNTER
I spoke with Mr. Cotto today and he states that he is having increased low back pain and wanted to know if his medication dose in his pain pump can be increased?

## 2022-03-15 NOTE — TELEPHONE ENCOUNTER
He would have to come in for an office visit in order to make adjustments to his pain pump.  He has had side effects at higher doses, so any increase would have to be very conservative.  EMS/Patient

## 2022-03-16 NOTE — TELEPHONE ENCOUNTER
PT WANTS TO COME IN SOONER THAN 4/19, SAYS THAT HE'S HAVING A LOT OF PAIN AND DOESN'T THINK HE CAN WAIT A MONTH. WAS TOLD THAT [facility] WOULD BE CALLING HIM BACK.

## 2022-03-17 NOTE — TELEPHONE ENCOUNTER
PT WAS CALLING BACK TO SPEAK TO THE NURSES ABOUT HIS PAIN PUMP.  PER HUB WORK FLOW WARM TRANSFERRED TO THE OFFICE

## 2022-03-23 ENCOUNTER — OFFICE VISIT (OUTPATIENT)
Dept: PAIN MEDICINE | Facility: CLINIC | Age: 77
End: 2022-03-23

## 2022-03-23 VITALS
HEIGHT: 71 IN | HEART RATE: 106 BPM | TEMPERATURE: 96.8 F | BODY MASS INDEX: 27.94 KG/M2 | DIASTOLIC BLOOD PRESSURE: 78 MMHG | WEIGHT: 199.6 LBS | RESPIRATION RATE: 18 BRPM | SYSTOLIC BLOOD PRESSURE: 122 MMHG

## 2022-03-23 DIAGNOSIS — G89.29 OTHER CHRONIC PAIN: ICD-10-CM

## 2022-03-23 DIAGNOSIS — Z97.8 PRESENCE OF INTRATHECAL PUMP: ICD-10-CM

## 2022-03-23 DIAGNOSIS — M79.10 GENERALIZED MUSCLE ACHE: Primary | ICD-10-CM

## 2022-03-23 DIAGNOSIS — M48.061 SPINAL STENOSIS OF LUMBAR REGION, UNSPECIFIED WHETHER NEUROGENIC CLAUDICATION PRESENT: ICD-10-CM

## 2022-03-23 DIAGNOSIS — M54.16 LUMBAR RADICULOPATHY: ICD-10-CM

## 2022-03-23 PROCEDURE — 99214 OFFICE O/P EST MOD 30 MIN: CPT | Performed by: NURSE PRACTITIONER

## 2022-03-23 PROCEDURE — 62368 ANALYZE SP INF PUMP W/REPROG: CPT | Performed by: NURSE PRACTITIONER

## 2022-03-23 RX ORDER — ONDANSETRON HYDROCHLORIDE 8 MG/1
TABLET, FILM COATED ORAL AS NEEDED
COMMUNITY
Start: 2022-03-17

## 2022-03-23 RX ORDER — MORPHINE SULFATE 60 MG/1
60 TABLET, FILM COATED, EXTENDED RELEASE ORAL 2 TIMES DAILY
COMMUNITY
Start: 2022-03-04

## 2022-03-23 NOTE — PROGRESS NOTES
CHIEF COMPLAINT  Follow-up for pain pump adjustment.    Subjective   Saurav Cotto . is a 77 y.o. male  who presents for follow-up.  He has a history of back pain and diffuse joint pain.    He states that his pain is 3-4/10VAS in severity at rest and will go up to 6-7/10VAS in severity. His pain is worse in the morning.  He is maintained on a IT Prialt pump at 2.75 mcg/day.  He was previously weaning his PO morphine down with the addition of the pain pump, but has since increase his dosage back to 60 mg BID due to generalized joint pain. (This is maintained by his PCP).     He states today that his back pain has worsened and he would like to increase his Prialt dose.  Reviewed that on 9/28/2021 he was seen in office due to side effects related to his Prialt which was at 3 mcg/day at the time. These side effects including blurred vision and bilateral lower extremity weakness.     Discussed that we can increase his Prialt by ~10% today which will bring him back up to 3 mcg/day.  If he develops side effects then we will need to decrease his prialt dose back down and consider changing the medication in his pump. The medication change will be decided by Dr. Brian. Discussed that if we were to transition him to an opioid in his pump his oral morphine would need to be weaned.     Patient remained masked during entire encounter. No cough present. I donned a mask and eye protection throughout entire visit. Prior to donning mask and eye protection, hand hygiene was performed, as well as when it was doffed.  I was closer than 6 feet, but not for an extended period of time. No obvious exposure to any bodily fluids.    Back Pain  This is a chronic problem. The current episode started more than 1 year ago. The problem occurs daily. The problem has been gradually worsening since onset. The pain is present in the lumbar spine. The quality of the pain is described as aching and burning. The pain is at a severity of 4/10. The  "pain is moderate. Pertinent negatives include no abdominal pain, bladder incontinence, bowel incontinence, chest pain, dysuria, fever, headaches, numbness or weakness. Risk factors include lack of exercise and sedentary lifestyle. He has tried muscle relaxant for the symptoms. The treatment provided moderate (pain pump with prialt therapy) relief.      PEG Assessment   What number best describes your pain on average in the past week?6  What number best describes how, during the past week, pain has interfered with your enjoyment of life?4  What number best describes how, during the past week, pain has interfered with your general activity?  4    The following portions of the patient's history were reviewed and updated as appropriate: allergies, current medications, past family history, past medical history, past social history, past surgical history and problem list.    Review of Systems   Constitutional: Negative for fatigue and fever.   HENT: Negative for congestion.    Eyes: Positive for visual disturbance (right eye).   Respiratory: Negative for shortness of breath.    Cardiovascular: Negative for chest pain.   Gastrointestinal: Negative for abdominal pain, bowel incontinence, constipation and diarrhea.   Genitourinary: Negative for bladder incontinence, difficulty urinating and dysuria.   Musculoskeletal: Positive for back pain.   Neurological: Negative for weakness, numbness and headaches.   Psychiatric/Behavioral: Negative for sleep disturbance and suicidal ideas. The patient is not nervous/anxious.      --  The aforementioned information the Chief Complaint section and above subjective data including any HPI data, and also the Review of Systems data, has been personally reviewed and affirmed.  --    Vitals:    03/23/22 1253   BP: 122/78   Pulse: 106   Resp: 18   Temp: 96.8 °F (36 °C)   Weight: 90.5 kg (199 lb 9.6 oz)   Height: 180.3 cm (71\")   PainSc:   5   PainLoc: Back     Objective   Physical Exam  Vitals " and nursing note reviewed.   Constitutional:       Appearance: Normal appearance. He is well-developed.   Eyes:      General: Lids are normal.   Cardiovascular:      Rate and Rhythm: Normal rate.   Pulmonary:      Effort: Pulmonary effort is normal.   Musculoskeletal:      Cervical back: Normal range of motion.      Lumbar back: Tenderness present. Decreased range of motion.      Comments: Diffuse arthritic changes   Neurological:      Mental Status: He is alert and oriented to person, place, and time.      Gait: Gait abnormal (cane).   Psychiatric:         Attention and Perception: Attention normal.         Mood and Affect: Mood normal.         Speech: Speech normal.         Behavior: Behavior normal.         Judgment: Judgment normal.       Intrathecal pump was interrogated in office today. Results are in chart.    The patient is currently at a dose of 2.749 of mcg per day.     The pump was set to continue at 3.001 mcg/day.     CADY is March 2028.    Pump refill date is 6/28/2022.    Assessment/Plan   Diagnoses and all orders for this visit:    1. Generalized muscle ache (Primary)    2. Other chronic pain    3. Presence of intrathecal pump    4. Lumbar radiculopathy    5. Spinal stenosis of lumbar region, unspecified whether neurogenic claudication present      --- Reviewed that we will not be able to fully eliminate his pain with use of the pain pump  --- Reviewed that if in the future the medication in his pain pump is transitioned to an opioid his oral opioid would have to be weaned.  Also reviewed that the definition of high dose opioid is greater than 40 mg of morphine a day.   --- Increase Prialt to 3.001 mcg/day. Reviewed with patient that the side effects he previously experienced at this dose were blurred vision and bilateral lower extremity weakness. If he experiences these side effects he is to notify our office. Discussed changes with Dr. Brian  --- Potentially consider am boluses, this will depend on  how he responds to this increase in Prialt dosing.   --- Follow-up for pump refill at previously scheduled visit on 4/19/22.     Dictated utilizing Dragon dictation.     This document is intended for medical expert use only. Reading of this document by patients and/or patient's family without participating medical staff guidance may result in misinterpretation and unintended morbidity.   Any interpretation of such data is the responsibility of the patient and/or family member responsible for the patient in concert with their primary or specialist providers, not to be left for sources of online searches such as [employer], [employer] or similar queries. Relying on these approaches to knowledge may result in misinterpretation, misguided goals of care and even death should patients or family members try recommendations outside of the realm of professional medical care in a supervised way.

## 2022-04-19 ENCOUNTER — OFFICE VISIT (OUTPATIENT)
Dept: PAIN MEDICINE | Facility: CLINIC | Age: 77
End: 2022-04-19

## 2022-04-19 VITALS
HEART RATE: 98 BPM | SYSTOLIC BLOOD PRESSURE: 129 MMHG | OXYGEN SATURATION: 94 % | BODY MASS INDEX: 28 KG/M2 | TEMPERATURE: 96.2 F | WEIGHT: 200 LBS | HEIGHT: 71 IN | DIASTOLIC BLOOD PRESSURE: 78 MMHG

## 2022-04-19 DIAGNOSIS — M54.16 LUMBAR RADICULOPATHY: ICD-10-CM

## 2022-04-19 DIAGNOSIS — G89.29 OTHER CHRONIC PAIN: Primary | ICD-10-CM

## 2022-04-19 DIAGNOSIS — M48.061 SPINAL STENOSIS OF LUMBAR REGION, UNSPECIFIED WHETHER NEUROGENIC CLAUDICATION PRESENT: ICD-10-CM

## 2022-04-19 DIAGNOSIS — Z97.8 PRESENCE OF INTRATHECAL PUMP: ICD-10-CM

## 2022-04-19 PROCEDURE — 99214 OFFICE O/P EST MOD 30 MIN: CPT | Performed by: NURSE PRACTITIONER

## 2022-04-19 PROCEDURE — 62369 ANAL SP INF PMP W/REPRG&FILL: CPT | Performed by: NURSE PRACTITIONER

## 2022-04-19 NOTE — PROGRESS NOTES
"CHIEF COMPLAINT  F/U back pain/pump refill- patient states that his pain has improved since his last visit.     Subjective   Saurav Cotto . is a 77 y.o. male  who presents for follow-up.  He has a history of chronic back pain.    Was unable to tolerate weaning of oral Morphine (managed by his pcp). Has gone back up to previous dose of 60 mg bid (MSContin).  States he was just \"too achy all over\".     F/u for IT Prialt pump refill. Current dose 3 mcg/day (increased at the previous office visit). Denies any side effects at this dose. Did experience some dizziness, blurred vision, and weakness at higher doses.  He reports that he feels better, pain is most severe when he first wakes up in the morning. Asks about a PTM for when pain is severe. He did tolerate this last dose increase well.      Patient remained masked during entire encounter. No cough present. I donned a mask and eye protection throughout entire visit. Prior to donning mask and eye protection, hand hygiene was performed, as well as when it was doffed.  I was closer than 6 feet, but not for an extended period of time. No obvious exposure to any bodily fluids.    Back Pain  This is a chronic problem. The current episode started more than 1 year ago. The problem occurs daily. The problem has been waxing and waning since onset. The pain is present in the lumbar spine. The quality of the pain is described as aching and burning. The pain is at a severity of 4/10. Associated symptoms include weakness. Pertinent negatives include no abdominal pain, chest pain, dysuria, fever, headaches or numbness. Treatments tried: IT pump medications. The treatment provided moderate relief.      PEG Assessment   What number best describes your pain on average in the past week?6  What number best describes how, during the past week, pain has interfered with your enjoyment of life?6  What number best describes how, during the past week, pain has interfered with your general " "activity?  6    The following portions of the patient's history were reviewed and updated as appropriate: allergies, current medications, past family history, past medical history, past social history, past surgical history and problem list.    Review of Systems   Constitutional: Positive for fatigue. Negative for activity change, chills and fever.   HENT: Negative for congestion.    Eyes: Negative for visual disturbance.   Respiratory: Negative for chest tightness and shortness of breath.    Cardiovascular: Negative for chest pain.   Gastrointestinal: Negative for abdominal pain, constipation and diarrhea.   Genitourinary: Negative for difficulty urinating and dysuria.   Musculoskeletal: Positive for back pain.   Neurological: Positive for weakness. Negative for dizziness, light-headedness, numbness and headaches.   Psychiatric/Behavioral: Negative for agitation and sleep disturbance. The patient is not nervous/anxious.      I have reviewed and confirmed the accuracy of the ROS as documented by the MA/LPN/RN ABDULKADIR Rowe    Vitals:    04/19/22 1309   BP: 129/78   Pulse: 98   Temp: 96.2 °F (35.7 °C)   SpO2: 94%   Weight: 90.7 kg (200 lb)   Height: 180.3 cm (71\")   PainSc:   4   PainLoc: Back     Objective   Physical Exam  Vitals and nursing note reviewed.   Constitutional:       General: He is not in acute distress.     Appearance: Normal appearance. He is not ill-appearing.   Pulmonary:      Effort: Pulmonary effort is normal. No respiratory distress.   Musculoskeletal:      Lumbar back: Tenderness present.   Skin:     General: Skin is warm and dry.   Neurological:      Mental Status: He is alert and oriented to person, place, and time.      Motor: Motor function is intact.      Gait: Gait normal.   Psychiatric:         Mood and Affect: Mood normal.         Behavior: Behavior normal.       Intrathecal pump was interrogated in office today. Results are in chart.     The patient is currently at a dose of 3 " mcg of Prialt per day.      Under sterile technique, the pump was accessed using a [employer] proprietary refill kit. The volume was withdrawn from the pump. The expected residual volume of the pump was 10.5 ml. The actual residual volume of the pump was 10 ml.     The pump was then refilled with 20 ml of Prialt at a concentration of 25 mcg/ml.      Patient tolerated procedure well. Minimal bleeding was noted. Pump site remains intact with no evidence of erythema or drainage.     Patient will return for pump refill when due or sooner if needed.      CADY is 72 months.     Pump refill date is 9/3/2022.    Assessment/Plan   Diagnoses and all orders for this visit:    1. Other chronic pain (Primary)    2. Presence of intrathecal pump    3. Lumbar radiculopathy    4. Spinal stenosis of lumbar region, unspecified whether neurogenic claudication present      --- No changes made to continuous rate  --- Addition of PTM initiated today.  0.15 mcg/activation, every 8 hours, max 2/24 hours.  (10% total daily rate increase)  --- Follow-up for next IT pump refill         As the clinician, I personally reviewed the WILDER from 4/19/2022 while the patient was in the office today.    This document is intended for medical expert use only. Reading of this document by patients and/or patient's family without participating medical staff guidance may result in misinterpretation and unintended morbidity.   Any interpretation of such data is the responsibility of the patient and/or family member responsible for the patient in concert with their primary or specialist providers, not to be left for sources of online searches such as [employer], [employer] or similar queries. Relying on these approaches to knowledge may result in misinterpretation, misguided goals of care and even death should patients or family members try recommendations outside of the realm of professional medical care in a supervised way.

## 2022-05-09 ENCOUNTER — TELEPHONE (OUTPATIENT)
Dept: PAIN MEDICINE | Facility: CLINIC | Age: 77
End: 2022-05-09

## 2022-05-09 ENCOUNTER — OFFICE VISIT (OUTPATIENT)
Dept: PAIN MEDICINE | Facility: CLINIC | Age: 77
End: 2022-05-09

## 2022-05-09 VITALS
WEIGHT: 191 LBS | SYSTOLIC BLOOD PRESSURE: 132 MMHG | DIASTOLIC BLOOD PRESSURE: 81 MMHG | HEIGHT: 71 IN | RESPIRATION RATE: 20 BRPM | HEART RATE: 88 BPM | BODY MASS INDEX: 26.74 KG/M2 | TEMPERATURE: 97.3 F | OXYGEN SATURATION: 95 %

## 2022-05-09 DIAGNOSIS — M48.061 SPINAL STENOSIS OF LUMBAR REGION, UNSPECIFIED WHETHER NEUROGENIC CLAUDICATION PRESENT: ICD-10-CM

## 2022-05-09 DIAGNOSIS — G89.29 OTHER CHRONIC PAIN: Primary | ICD-10-CM

## 2022-05-09 DIAGNOSIS — R29.898 WEAKNESS OF BOTH LOWER EXTREMITIES: ICD-10-CM

## 2022-05-09 DIAGNOSIS — Z97.8 PRESENCE OF INTRATHECAL PUMP: ICD-10-CM

## 2022-05-09 DIAGNOSIS — M54.16 LUMBAR RADICULOPATHY: ICD-10-CM

## 2022-05-09 PROCEDURE — 99213 OFFICE O/P EST LOW 20 MIN: CPT | Performed by: NURSE PRACTITIONER

## 2022-05-09 RX ORDER — PREDNISOLONE ACETATE 10 MG/ML
SUSPENSION/ DROPS OPHTHALMIC
COMMUNITY
Start: 2022-04-27 | End: 2023-02-20

## 2022-05-09 NOTE — PROGRESS NOTES
CHIEF COMPLAINT  F/u back pain. Pt sts has severe weakness in bilat legs since last ov. Pt maryjo     Subjective   Saurav Cotto . is a 77 y.o. male  who presents for follow-up.  He has a history of chronic back pain. Reports his pain is VARAIBLE since last evaluation. He reports his pain is controlled but his primary complaint is leg weakness. In March 2022, his pump was set at 2.75 mcg/day and then increased to 3.00 mcg/day. On 4-19-22, no changes to daily dose but addition of PTM.  Has used the PTM once and noticed improvement without side effects.    Patient was last evaluated the office on 4/19/2022 with ABDULKADIR Rowe.  Continues with IT Prialt and pump.  Current dose is 3 mcg/day.  It was increased from last office visit.  Did experience some dizziness, blurred vision and weakness at higher doses.  Reports he feels better and pain is most severe when he wakes up in the morning.  Ask about a PTM.  He did seem to tolerate this last dose increase well.  Was unable to tolerate weaning of oral morphine managed by his PCP.  Has gone up to his previous dose of MS CONTIN 60 mg twice daily.  No changes were made to the continuous rate of the Prialt pump.  Addition of PTM.  0.15 mcg per activation every 8 hours max of 2 in 24 hours.  Follow-up for next IT pump refill.    Complains of pain in his low back, and leg weakness. His primary complaint is his leg weakness. Denies any falls. Denies any bowel or bladder incontinence.  He states previously when his pump was increased, he had leg weakness. He believes this is what is causing his leg weakness now.     Patient remained masked during entire encounter. No cough present. I donned a mask and eye protection throughout entire visit. Prior to donning mask and eye protection, hand hygiene was performed, as well as when it was doffed.  I was closer than 6 feet, but not for an extended period of time. No obvious exposure to any bodily fluids.    History of Present  "Illness     PEG Assessment   What number best describes your pain on average in the past week?3  What number best describes how, during the past week, pain has interfered with your enjoyment of life?3  What number best describes how, during the past week, pain has interfered with your general activity?  3    The following portions of the patient's history were reviewed and updated as appropriate: allergies, current medications, past family history, past medical history, past social history, past surgical history and problem list.    Review of Systems   Constitutional: Positive for activity change (dec) and fatigue (occ). Negative for fever.   HENT: Negative for congestion.    Eyes: Negative for visual disturbance.   Respiratory: Negative for cough and shortness of breath.    Cardiovascular: Negative for chest pain.   Gastrointestinal: Negative for constipation and diarrhea.   Genitourinary: Negative for difficulty urinating.   Musculoskeletal: Positive for back pain.   Neurological: Positive for weakness (bilat leg). Negative for dizziness, light-headedness, numbness and headaches.   Psychiatric/Behavioral: Negative for agitation, behavioral problems, sleep disturbance and suicidal ideas. The patient is not nervous/anxious.      I have reviewed and confirmed the accuracy of the ROS as documented by the MA/LPN/RN Penelope Fernandes, APRN      Vitals:    05/09/22 1349   BP: 132/81   Pulse: 88   Resp: 20   Temp: 97.3 °F (36.3 °C)   SpO2: 95%   Weight: 86.6 kg (191 lb)   Height: 180.3 cm (71\")   PainSc:   2   PainLoc: Back         Objective   Physical Exam  Vitals and nursing note reviewed.   Constitutional:       Appearance: He is well-developed.   HENT:      Head: Normocephalic and atraumatic.   Musculoskeletal:      Lumbar back: Tenderness present. Decreased range of motion.   Neurological:      Mental Status: He is alert.      Gait: Gait abnormal (cane).   Psychiatric:         Speech: Speech normal.         Behavior: " Behavior normal.         Thought Content: Thought content normal.         Judgment: Judgment normal.         Assessment/Plan   Diagnoses and all orders for this visit:    1. Other chronic pain (Primary)    2. Spinal stenosis of lumbar region, unspecified whether neurogenic claudication present    3. Lumbar radiculopathy    4. Presence of intrathecal pump    5. Weakness of both lower extremities      --- Decrease Prialt to 2.75 mcg/day.  --- Continue with PTM as previously programmed.  --- If no improvement in weakness, updated lumbar MRI  --- Follow-up as scheduled.       WILDER REPORT  As the clinician, I personally reviewed the WILDER from 5-9-22 while the patient was in the office today.           Dictated utilizing Dragon dictation.     This document is intended for medical expert use only. Reading of this document by patients and/or patient's family without participating medical staff guidance may result in misinterpretation and unintended morbidity.   Any interpretation of such data is the responsibility of the patient and/or family member responsible for the patient in concert with their primary or specialist providers, not to be left for sources of online searches such as [employer], [employer] or similar queries. Relying on these approaches to knowledge may result in misinterpretation, misguided goals of care and even death should patients or family members try recommendations outside of the realm of professional medical care in a supervised way.

## 2022-05-09 NOTE — TELEPHONE ENCOUNTER
Patient states that he is having leg weakness. He can hardly work. He would like to decrease back to what he had prior to the increase. He only has used his PTM once. He was increased prior to the ptm.

## 2022-05-09 NOTE — TELEPHONE ENCOUNTER
According to last note, he was not increased. They added the PTM it looks like. Please call and get more ifnormation. Thanks. BB

## 2022-05-09 NOTE — TELEPHONE ENCOUNTER
Hub staff attempted to follow warm transfer process and was unsuccessful     Caller: ESTEE MAYO    Relationship to patient: SELF    Best call back number: [phone]    Patient is needing: PT IS HAVING SIDE EFFECTS AND NEEDS HIS PAIN PUMP ADJUSTED TO A LOWER LEVEL. PT REQUESTED TO SPEAK W/ CHARLES REYNOLDS.

## 2022-05-09 NOTE — TELEPHONE ENCOUNTER
What Increase? He didn't have one at last office visit. Leg weakness would not be related to increase either. This may be his back worsening. Please get more specific informatino. Thanks. BB

## 2022-05-27 ENCOUNTER — OFFICE (OUTPATIENT)
Dept: URBAN - METROPOLITAN AREA CLINIC 75 | Facility: CLINIC | Age: 77
End: 2022-05-27

## 2022-05-27 VITALS
WEIGHT: 189 LBS | OXYGEN SATURATION: 99 % | HEART RATE: 69 BPM | DIASTOLIC BLOOD PRESSURE: 84 MMHG | HEIGHT: 70 IN | SYSTOLIC BLOOD PRESSURE: 134 MMHG

## 2022-05-27 DIAGNOSIS — R11.2 NAUSEA WITH VOMITING, UNSPECIFIED: ICD-10-CM

## 2022-05-27 DIAGNOSIS — K31.84 GASTROPARESIS: ICD-10-CM

## 2022-05-27 DIAGNOSIS — K21.9 GASTRO-ESOPHAGEAL REFLUX DISEASE WITHOUT ESOPHAGITIS: ICD-10-CM

## 2022-05-27 PROCEDURE — 99214 OFFICE O/P EST MOD 30 MIN: CPT | Performed by: INTERNAL MEDICINE

## 2022-05-27 RX ORDER — METOCLOPRAMIDE HYDROCHLORIDE 5 MG/1
TABLET ORAL
Qty: 180 | Refills: 7 | Status: COMPLETED
End: 2022-12-05

## 2022-08-02 ENCOUNTER — OFFICE VISIT (OUTPATIENT)
Dept: PAIN MEDICINE | Facility: CLINIC | Age: 77
End: 2022-08-02

## 2022-08-02 VITALS
DIASTOLIC BLOOD PRESSURE: 87 MMHG | SYSTOLIC BLOOD PRESSURE: 152 MMHG | HEART RATE: 93 BPM | TEMPERATURE: 97.5 F | BODY MASS INDEX: 26.46 KG/M2 | WEIGHT: 189 LBS | HEIGHT: 71 IN | OXYGEN SATURATION: 97 % | RESPIRATION RATE: 20 BRPM

## 2022-08-02 DIAGNOSIS — G89.29 OTHER CHRONIC PAIN: Primary | ICD-10-CM

## 2022-08-02 DIAGNOSIS — M48.061 SPINAL STENOSIS OF LUMBAR REGION, UNSPECIFIED WHETHER NEUROGENIC CLAUDICATION PRESENT: ICD-10-CM

## 2022-08-02 DIAGNOSIS — Z97.8 PRESENCE OF INTRATHECAL PUMP: ICD-10-CM

## 2022-08-02 PROCEDURE — 62369 ANAL SP INF PMP W/REPRG&FILL: CPT | Performed by: NURSE PRACTITIONER

## 2022-08-02 RX ORDER — LOSARTAN POTASSIUM 100 MG/1
100 TABLET ORAL DAILY
COMMUNITY
Start: 2022-06-09 | End: 2023-02-20

## 2022-08-02 RX ORDER — RIVASTIGMINE TARTRATE 6 MG/1
1 CAPSULE ORAL 2 TIMES DAILY
COMMUNITY
Start: 2022-07-01 | End: 2023-02-21 | Stop reason: SDUPTHER

## 2022-08-02 NOTE — PROGRESS NOTES
"CHIEF COMPLAINT  F/u back pain. Pt here for pump refill today.     Subjective   Saurav Cotto . is a 77 y.o. male  who presents for follow-up.  He has a history of back pain, presents today for IT pump refill.    Was unable to tolerate weaning of oral Morphine (managed by his pcp). Returned to previous dose of 60 mg bid (MSContin).  States he was just \"too achy all over\".     F/u for IT Prialt pump refill. Last visit reported leg weakness, dose of Prialt was reduced to 2.75 mcg/day, PTM was continued 0.15 mcg/activation, every 8 hours, max 2/24 hours. He reports that he has not had to utilize the bolus at all.  Denies any side effects at this dose.  Says weakness persists, does not feel that it is associated with the medication at this point. Did experience some dizziness, blurred vision, and weakness at higher doses.  He requests to continue with the current regimen today.      Patient remained masked during entire encounter. No cough present. I donned a mask and eye protection throughout entire visit. Prior to donning mask and eye protection, hand hygiene was performed, as well as when it was doffed.  I was closer than 6 feet, but not for an extended period of time. No obvious exposure to any bodily fluids.    Back Pain  This is a chronic problem. The current episode started more than 1 year ago. The problem occurs daily. The problem has been waxing and waning since onset. The pain is present in the lumbar spine. The quality of the pain is described as aching and burning. The pain is at a severity of 2/10. The pain is mild. Pertinent negatives include no abdominal pain, chest pain, dysuria, fever, headaches, numbness or weakness. Treatments tried: IT pump medications. The treatment provided moderate relief.        PEG Assessment   What number best describes your pain on average in the past week?2  What number best describes how, during the past week, pain has interfered with your enjoyment of life?2  What " "number best describes how, during the past week, pain has interfered with your general activity?  2    The following portions of the patient's history were reviewed and updated as appropriate: allergies, current medications, past family history, past medical history, past social history, past surgical history and problem list.    Review of Systems   Constitutional: Negative for activity change, fatigue and fever.   HENT: Negative for congestion.    Eyes: Negative for visual disturbance.   Respiratory: Negative for cough and shortness of breath.    Cardiovascular: Negative for chest pain.   Gastrointestinal: Negative for abdominal pain, constipation and diarrhea.   Genitourinary: Negative for difficulty urinating and dysuria.   Musculoskeletal: Positive for back pain.   Neurological: Negative for dizziness, weakness, light-headedness, numbness and headaches.   Psychiatric/Behavioral: Negative for agitation, sleep disturbance and suicidal ideas. The patient is not nervous/anxious.      I have reviewed and confirmed the accuracy of the ROS as documented by the MA/LPN/RN ABDULKADIR Rowe    Vitals:    08/02/22 0913   BP: 152/87   Pulse: 93   Resp: 20   Temp: 97.5 °F (36.4 °C)   SpO2: 97%   Weight: 85.7 kg (189 lb)   Height: 180.3 cm (71\")   PainSc:   2   PainLoc: Back         Objective   Physical Exam  Vitals and nursing note reviewed.   Constitutional:       General: He is not in acute distress.     Appearance: Normal appearance. He is not ill-appearing.   Pulmonary:      Effort: Pulmonary effort is normal. No respiratory distress.   Musculoskeletal:      Lumbar back: Tenderness and bony tenderness present.   Skin:     General: Skin is warm and dry.   Neurological:      Mental Status: He is alert and oriented to person, place, and time.      Motor: Motor function is intact.      Gait: Gait abnormal (ambulates with cane).   Psychiatric:         Mood and Affect: Mood normal.         Behavior: Behavior normal. "       Intrathecal pump was interrogated in office today. Results are in chart.     The patient is currently at a dose of 2.75 mcg of Prialt per day.  PTM 0.15 mcg/activation, every 8 hours, max 2/24 hours.     Under sterile technique, the pump was accessed using a [employer] proprietary refill kit. The volume was withdrawn from the pump. The expected residual volume of the pump was 8.3 ml. The actual residual volume of the pump was 8.3 ml.     The pump was then refilled with 20 ml of Prialt at a concentration of 25 mcg/ml.      Patient tolerated procedure well. Minimal bleeding was noted. Pump site remains intact with no evidence of erythema or drainage.     Patient will return for pump refill when due or sooner if needed.      CADY is 69 months.     Pump refill date is 12/27/22      Assessment & Plan   Diagnoses and all orders for this visit:    1. Other chronic pain (Primary)    2. Spinal stenosis of lumbar region, unspecified whether neurogenic claudication present    3. Presence of intrathecal pump        --- Offered CT lumbar spine to further eval leg weakness. He declines. Has had two rounds of PT without success.    --- IT Prialt refilled as documented. No changes made to regimen today.   --- Follow-up in approximately 84 days for next refill           This document is intended for medical expert use only. Reading of this document by patients and/or patient's family without participating medical staff guidance may result in misinterpretation and unintended morbidity.   Any interpretation of such data is the responsibility of the patient and/or family member responsible for the patient in concert with their primary or specialist providers, not to be left for sources of online searches such as [employer], [employer] or similar queries. Relying on these approaches to knowledge may result in misinterpretation, misguided goals of care and even death should patients or family members try recommendations outside of the realm  of professional medical care in a supervised way.

## 2022-08-22 ENCOUNTER — TELEPHONE (OUTPATIENT)
Dept: PAIN MEDICINE | Facility: CLINIC | Age: 77
End: 2022-08-22

## 2022-08-22 NOTE — TELEPHONE ENCOUNTER
This is too much to address in a phone message.  Schedule follow up.  Ok to f/u with Carmita as she knows what is going on here.

## 2022-08-22 NOTE — TELEPHONE ENCOUNTER
Pt and spouse called in today stg he has been weak for a very long time and it is getting worse. Pt spouse on speaker phone with pt. She sts pt has not been stable on his feet, can't move or walk, weak in the knees, can hardly stand. All of this started worsening a few weeks ago. 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 She sts he sleeps all the time in bed or chair. She sts people tried talking to him at [employer] yesterday and he was sleeping. She sts yesterday he was confused at times. She sts his quality of life is zero, and can't function like this. Pt sts he talked with Dr. Brian before about switching pump med to Morphine. Pt sts he is also taking a lot of otc tylenol and morphine 60mg tabs in addition to having the pain pump and his back pain is worse. Pt sts when he was just on morphine pills and no pain pump, his pain was controlled. They would like to know what to do and what your medical recommendation is moving forward. Please advise. Thank you.

## 2022-08-23 ENCOUNTER — OFFICE VISIT (OUTPATIENT)
Dept: PAIN MEDICINE | Facility: CLINIC | Age: 77
End: 2022-08-23

## 2022-08-23 VITALS
WEIGHT: 187 LBS | TEMPERATURE: 97.3 F | RESPIRATION RATE: 18 BRPM | OXYGEN SATURATION: 95 % | HEIGHT: 71 IN | BODY MASS INDEX: 26.18 KG/M2 | HEART RATE: 78 BPM | DIASTOLIC BLOOD PRESSURE: 94 MMHG | SYSTOLIC BLOOD PRESSURE: 163 MMHG

## 2022-08-23 DIAGNOSIS — Z97.8 PRESENCE OF INTRATHECAL PUMP: ICD-10-CM

## 2022-08-23 DIAGNOSIS — R29.898 WEAKNESS OF BOTH LOWER EXTREMITIES: ICD-10-CM

## 2022-08-23 DIAGNOSIS — G89.29 OTHER CHRONIC PAIN: Primary | ICD-10-CM

## 2022-08-23 DIAGNOSIS — M54.16 LUMBAR RADICULOPATHY: ICD-10-CM

## 2022-08-23 DIAGNOSIS — M48.061 SPINAL STENOSIS OF LUMBAR REGION, UNSPECIFIED WHETHER NEUROGENIC CLAUDICATION PRESENT: ICD-10-CM

## 2022-08-23 PROCEDURE — 99215 OFFICE O/P EST HI 40 MIN: CPT | Performed by: NURSE PRACTITIONER

## 2022-08-23 NOTE — TELEPHONE ENCOUNTER
Called and spoke with pt. He sts he wants to switch his appt to today at 1540. He has been rescheduled. Please advise. Thank you.

## 2022-08-23 NOTE — TELEPHONE ENCOUNTER
You have an opening at 1540 today. Based on previous messages, do you think pt needs to be seen today or can it wait until tomorrow when he's scheduled to come in?

## 2022-08-23 NOTE — PROGRESS NOTES
CHIEF COMPLAINT  Follow up for back pain    Subjective   Saurav Cotto Sr. is a 77 y.o. male  who presents for follow-up.  He has a history of back pain.  He complains of worsening leg weakness, also sleeping all the time, has no quality of life.  Worsening over the last 6 months.      Patient had a [employer] intrathecal pain pump implanted 7/16/2021.  Since the time of implant patient has experienced multiple complaints which he feels may be side effects of the Prialt including weakness of the lower extremities as well as blurred vision.  We have made slow and gradual adjustments both up and down over the past year to help to mitigate the side effects with varying degrees of responsiveness.  Unfortunately he continues to intermittently experience these complaints which he is convinced are related to the medication.  Interestingly he reports that the symptoms have gotten worse since our last dose reduction of the medication.  Furthermore his pain control continues to be up-and-down and overall does not feel that the Prialt has been of any significant benefit more than with the oral morphine alone.      Was unable to tolerate weaning of oral Morphine (managed by his pcp).  Returned to previous dose of 60 mg bid (MSContin) several months ago.      At the previous office visit I offered a lumbar imaging to further evaluate his leg weakness.  He declined this.  He is also had 2 rounds of physical therapy without success.    Worsening confusion over the past 6 months.  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      Neuropsychological testing done in 2014. Diagnosed with [facility].  Feels recent confusion is different/worsening.  Seeing neurologist every 6 months (someone at [facility], unsure of name today)    Back Pain  This is a chronic problem. The current episode started more than 1 year ago. The problem occurs daily. The problem has been waxing and waning since onset. The pain is present in the lumbar spine.  "The quality of the pain is described as aching and burning. The pain is at a severity of 7/10. The pain is mild. Associated symptoms include weakness (Bilateral hips/ legs ). Pertinent negatives include no abdominal pain, chest pain, dysuria, fever, headaches or numbness. Treatments tried: IT pump medications. The treatment provided moderate relief.     PEG Assessment   What number best describes your pain on average in the past week?7  What number best describes how, during the past week, pain has interfered with your enjoyment of life?10  What number best describes how, during the past week, pain has interfered with your general activity?  10    The following portions of the patient's history were reviewed and updated as appropriate: allergies, current medications, past family history, past medical history, past social history, past surgical history and problem list.    Review of Systems   Constitutional: Negative for fever.   HENT: Negative for congestion.    Eyes: Negative for visual disturbance.   Respiratory: Negative for shortness of breath.    Cardiovascular: Negative for chest pain.   Gastrointestinal: Negative for abdominal pain, constipation and diarrhea.   Genitourinary: Negative for difficulty urinating and dysuria.   Musculoskeletal: Positive for back pain, joint swelling and myalgias.   Neurological: Positive for weakness (Bilateral hips/ legs ). Negative for numbness and headaches.   Psychiatric/Behavioral: Negative for sleep disturbance and suicidal ideas.     I have reviewed and confirmed the accuracy of the ROS as documented by the MA/LPN/RN ABDULKADIR Rowe    Vitals:    08/23/22 1525   BP: 163/94   Pulse: 78   Resp: 18   Temp: 97.3 °F (36.3 °C)   SpO2: 95%   Weight: 84.8 kg (187 lb)   Height: 180.3 cm (70.98\")   PainSc:   7   PainLoc: Back     Objective   Physical Exam  Vitals and nursing note reviewed.   Constitutional:       General: He is not in acute distress.     Appearance: Normal " "appearance. He is not ill-appearing.   Pulmonary:      Effort: Pulmonary effort is normal. No respiratory distress.   Musculoskeletal:      Lumbar back: Tenderness and bony tenderness present.   Skin:     General: Skin is warm and dry.   Neurological:      Mental Status: He is alert and oriented to person, place, and time.      Motor: Motor function is intact. No weakness.      Gait: Gait abnormal (ambulates with cane).   Psychiatric:         Mood and Affect: Mood normal.         Behavior: Behavior normal.       Assessment & Plan   Diagnoses and all orders for this visit:    1. Other chronic pain (Primary)    2. Presence of intrathecal pump    3. Spinal stenosis of lumbar region, unspecified whether neurogenic claudication present  -     MRI Lumbar Spine Without Contrast; Future    4. Lumbar radiculopathy    5. Weakness of both lower extremities  -     MRI Lumbar Spine Without Contrast; Future      --- Requesting to change to an opioid and his pain pump.  He is not willing to discontinue oral morphine as he states this is for his \"all over pain\".  Discussed with the patient that we do not typically utilize intrathecal opioids concurrently with high-dose oral opioids.  He is not happy with his response.  I have offered to set him up for evaluation with Dr. Brian who would ultimately be the one to make this type of high risk decision.  --- MRI lumbar spine to further evaluate worsening complaints of leg weakness. Once patient has date set for MRI he will notify office so that [facility] can facilitate MRI with pain pump.    --- Keep upcoming appointment with neurology to further evaluate worsening complaints of confusion  --- Reduce Prialt dose to 1.375 mcg (50% dose reduction).  Decreasing more significantly at this visit to help differentiate between medication side effects versus other medical issues.  --- Follow-up with Dr. Brian at the next available appointment          As the clinician, I personally " reviewed the WILDER from 8/23/2022 while the patient was in the office today.    This document is intended for medical expert use only. Reading of this document by patients and/or patient's family without participating medical staff guidance may result in misinterpretation and unintended morbidity.   Any interpretation of such data is the responsibility of the patient and/or family member responsible for the patient in concert with their primary or specialist providers, not to be left for sources of online searches such as [employer], [employer] or similar queries. Relying on these approaches to knowledge may result in misinterpretation, misguided goals of care and even death should patients or family members try recommendations outside of the realm of professional medical care in a supervised way.    Patient remained masked during entire encounter. No cough present. I donned a mask and eye protection throughout entire visit. Prior to donning mask and eye protection, hand hygiene was performed, as well as when it was doffed.  I was closer than 6 feet, but not for an extended period of time. No obvious exposure to any bodily fluids.    I spent 44 minutes caring for Saurav on this date of service. This time includes time spent by me in the following activities: preparing for the visit, obtaining and/or reviewing a separately obtained history, performing a medically appropriate examination and/or evaluation, counseling and educating the patient/family/caregiver, ordering medications, tests, or procedures, referring and communicating with other health care professionals and documenting information in the medical record

## 2022-09-07 ENCOUNTER — TELEPHONE (OUTPATIENT)
Dept: PAIN MEDICINE | Facility: CLINIC | Age: 77
End: 2022-09-07

## 2022-09-07 NOTE — TELEPHONE ENCOUNTER
11/2/22 APPT WITH THEE GUZMAN NEEDS TO BE RESCHEDULE TO NEXT AVAILABLE WITH DR BRANDT. OK TO SCHEDULE.

## 2022-09-30 NOTE — PROGRESS NOTES
CHIEF COMPLAINT  F/U back pain- pump refill- patient states that his pain has remained the same since his last visit.    Subjective   Saurav Cotto Sr. is a 76 y.o. male  who presents for follow-up.  He has a history of back pain. Today his pain is 1/10VAS in severity.     He and his wife ask about weaning his MS Contin today.  His Morphine is prescribed by Dr. Pierre, explained that she can begin weaning this as he tolerates.  Discussed that as he weans from his oral opioids if he needs his pump adjusted to notify our office.     Patient remained masked during entire encounter. No cough present. I donned a mask and eye protection throughout entire visit. Prior to donning mask and eye protection, hand hygiene was performed, as well as when it was doffed.  I was closer than 6 feet, but not for an extended period of time. No obvious exposure to any bodily fluids.    Back Pain  This is a chronic problem. The current episode started more than 1 year ago. The problem is unchanged. The pain is at a severity of 1/10. The pain is moderate. Pertinent negatives include no abdominal pain, bladder incontinence, bowel incontinence, chest pain, dysuria, fever, headaches, numbness or weakness. Risk factors include lack of exercise and sedentary lifestyle. He has tried muscle relaxant for the symptoms. The treatment provided significant (pain pump with prialt therapy) relief.      PEG Assessment   What number best describes your pain on average in the past week?4  What number best describes how, during the past week, pain has interfered with your enjoyment of life?3  What number best describes how, during the past week, pain has interfered with your general activity?  3    The following portions of the patient's history were reviewed and updated as appropriate: allergies, current medications, past family history, past medical history, past social history, past surgical history and problem list.    Review of Systems  Called pt and left vm for pt to call back.   "  Constitutional: Positive for activity change (decreased) and fatigue. Negative for chills and fever.   HENT: Negative for congestion.    Eyes: Negative for visual disturbance.   Respiratory: Negative for chest tightness and shortness of breath.    Cardiovascular: Negative for chest pain.   Gastrointestinal: Negative for abdominal pain, bowel incontinence, constipation and diarrhea.   Genitourinary: Negative for bladder incontinence, difficulty urinating and dysuria.   Musculoskeletal: Positive for back pain.   Neurological: Negative for dizziness, weakness, light-headedness, numbness and headaches.   Psychiatric/Behavioral: Negative for agitation and sleep disturbance. The patient is not nervous/anxious.      --  The aforementioned information the Chief Complaint section and above subjective data including any HPI data, and also the Review of Systems data, has been personally reviewed and affirmed.  --    Vitals:    08/10/21 1353   BP: 148/91   Pulse: 91   Resp: 16   Temp: 97.3 °F (36.3 °C)   SpO2: 95%   Weight: 89.2 kg (196 lb 9.6 oz)   Height: 180.3 cm (71\")   PainSc:   1   PainLoc: Back     Objective   Physical Exam  Vitals and nursing note reviewed.   Constitutional:       Appearance: Normal appearance. He is well-developed.   Eyes:      General: Lids are normal.   Cardiovascular:      Rate and Rhythm: Normal rate.   Pulmonary:      Effort: Pulmonary effort is normal.   Musculoskeletal:      Cervical back: Normal range of motion.      Lumbar back: Tenderness and bony tenderness present. Decreased range of motion.   Skin:         Neurological:      Mental Status: He is alert and oriented to person, place, and time.      Gait: Gait normal.   Psychiatric:         Attention and Perception: Attention normal.         Mood and Affect: Mood normal.         Speech: Speech normal.         Behavior: Behavior normal.         Judgment: Judgment normal.       Intrathecal pump was interrogated in office today. Results are in " chart.    The patient is currently at a dose of 1.25 mcg of Prialt per day.    Under sterile technique, the pump was accessed using a [employer] proprietary refill kit. The volume was withdrawn from the pump. The expected residual volume of the pump was 12.5 ml. The actual residual volume of the pump was 12.5 ml.    The pump was then refilled with 20 ml of Prialt at a concentration of 25 mcg/ml. The pump was set to continue at its previous rate. No changes were made.     Patient tolerated procedure well. Minimal bleeding was noted. Pump site remains intact with no evidence of erythema or drainage.    Patient will return for pump refill when due or sooner if needed.     CADY is April 2028     Assessment/Plan   Diagnoses and all orders for this visit:    1. S/P insertion of intrathecal pump (Primary)    2. Other chronic pain    3. Spinal stenosis of lumbar region, unspecified whether neurogenic claudication present      --- Discuss with Dr. Pierre regarding weaning from MS Contin.   --- Discussed that he can be seen in the office for pump adjustments as needed. Patient and wife state understanding.   --- Follow-up 12 weeks for pump refill or sooner if needed.      WILDER REPORT    As the clinician, I personally reviewed the WILDER from 8/10/2021 while the patient was in the office today.     Dictated utilizing Dragon dictation.

## 2022-11-21 ENCOUNTER — OFFICE VISIT (OUTPATIENT)
Dept: PAIN MEDICINE | Facility: CLINIC | Age: 77
End: 2022-11-21

## 2022-11-21 VITALS
SYSTOLIC BLOOD PRESSURE: 173 MMHG | RESPIRATION RATE: 20 BRPM | HEART RATE: 82 BPM | HEIGHT: 71 IN | OXYGEN SATURATION: 94 % | DIASTOLIC BLOOD PRESSURE: 100 MMHG | WEIGHT: 179 LBS | TEMPERATURE: 97.8 F | BODY MASS INDEX: 25.06 KG/M2

## 2022-11-21 DIAGNOSIS — Z97.8 PRESENCE OF INTRATHECAL PUMP: Primary | ICD-10-CM

## 2022-11-21 DIAGNOSIS — M48.061 SPINAL STENOSIS OF LUMBAR REGION, UNSPECIFIED WHETHER NEUROGENIC CLAUDICATION PRESENT: ICD-10-CM

## 2022-11-21 PROCEDURE — 99214 OFFICE O/P EST MOD 30 MIN: CPT | Performed by: ANESTHESIOLOGY

## 2022-11-21 PROCEDURE — 62369 ANAL SP INF PMP W/REPRG&FILL: CPT | Performed by: ANESTHESIOLOGY

## 2022-11-21 RX ORDER — HYDRALAZINE HYDROCHLORIDE 25 MG/1
25 TABLET, FILM COATED ORAL 3 TIMES DAILY
COMMUNITY
Start: 2022-11-01 | End: 2023-02-20 | Stop reason: ALTCHOICE

## 2022-11-21 RX ORDER — HYDRALAZINE HYDROCHLORIDE 100 MG/1
100 TABLET, FILM COATED ORAL 3 TIMES DAILY
COMMUNITY
Start: 2022-11-11

## 2022-11-21 RX ORDER — TACROLIMUS 1 MG/1
1 CAPSULE ORAL 2 TIMES DAILY
COMMUNITY
Start: 2022-09-23 | End: 2023-02-20

## 2022-11-21 NOTE — PROGRESS NOTES
CHIEF COMPLAINT  F/u back pain. Pt here for pump refill.     Subjective   Saurav Cotto . is a 77 y.o. male  who presents for follow-up.  He has a history of back pain, spinal stenosis, also gastroparesis.    Intrathecal pump refill and reprogramming.  The patient is running ziconotide at the stock concentration of 25 mcg/mL.  Expected residual volume was 12.7 mL in the actual residual volume was at the same.  Patient has been running simple continuous dose of 1.375 mcg/day and the pump was refilled with 20 mL of ziconotide at the same concentration and was continued at the same 24-hour dose.  This pump is a 20 mL Medtronic SynchroMed 2 pump with an estimated replacement March 2028.  Catheter tip is at T9.      History of Present Illness     Overall right now he feels that his pain control is doing much better.  He has little to no pain at rest and some mild soreness with activity.  He feels like his back pain is doing well.    We talked about his previous experience with trying to transition off of morphine.  I expressed my concern about gastroparesis and oral morphine.  He stated that when he tried to wean off of morphine completely he had a lot of diffuse body pains which did not resolve and prompted he and his prescriber to reinstitute it.    He is suffering quite a bit from gastroparesis.  Nearly constant nausea.  A lot of dry heaves.        PEG Assessment   What number best describes your pain on average in the past week?1  What number best describes how, during the past week, pain has interfered with your enjoyment of life?1  What number best describes how, during the past week, pain has interfered with your general activity?  1    --  The aforementioned information the Chief Complaint section and above subjective data including any HPI data, and also the Review of Systems data, has been personally reviewed and affirmed.  --        Review of Pertinent Medical Data ---  E-kathryn report is reviewed:  I  reviewed the document in the electronic form under the PDMP tab in the Epic EMR...  - In this function, the report is a current report in as close to real-time as possible.  - The report was available for immediate review.    - I did jesús the report as reviewed.  - There is not concern for aberrant behavior based on this ekasper review.      The following portions of the patient's history were reviewed and updated as appropriate: allergies, current medications, past family history, past medical history, past social history, past surgical history and problem list.    -------    The following portions of the patient's history were reviewed and updated as appropriate: allergies, current medications, past family history, past medical history, past social history, past surgical history and problem list.    Allergies   Allergen Reactions   • Coreg [Carvedilol] Hives   • Singulair [Montelukast Sodium] Other (See Comments)     weakness   • Theophyllines Other (See Comments)     weakness         Current Outpatient Medications:   •  albuterol (PROVENTIL HFA;VENTOLIN HFA) 108 (90 Base) MCG/ACT inhaler, Inhale 2 puffs Every 4 (Four) Hours As Needed for Wheezing., Disp: , Rfl:   •  amLODIPine (NORVASC) 10 MG tablet, , Disp: , Rfl:   •  aspirin 81 MG EC tablet, Take 81 mg by mouth. HOLD PRIOR TO SURGERY, Disp: , Rfl:   •  atorvastatin (LIPITOR) 10 MG tablet, , Disp: , Rfl:   •  atorvastatin (LIPITOR) 20 MG tablet, , Disp: , Rfl:   •  baclofen (LIORESAL) 10 MG tablet, Take 10 mg by mouth 3 (Three) Times a Day., Disp: , Rfl:   •  busPIRone (BUSPAR) 15 MG tablet, , Disp: , Rfl:   •  chlorthalidone (HYGROTON) 25 MG tablet, Take 25 mg by mouth Every Morning., Disp: , Rfl:   •  Cholecalciferol (VITAMIN D3) 2000 units tablet, Take 1 tablet by mouth Every Evening. HOLD PRIOR TO SURGERY, Disp: , Rfl:   •  cyanocobalamin 1000 MCG/ML injection, Inject 1,000 mcg into the shoulder, thigh, or buttocks Every 28 (Twenty-Eight) Days., Disp: ,  Rfl:   •  dexamethasone (DECADRON) 0.5 MG tablet, , Disp: , Rfl:   •  DULoxetine (CYMBALTA) 60 MG capsule, Take 60 mg by mouth., Disp: , Rfl:   •  hydrALAZINE (APRESOLINE) 100 MG tablet, Take 100 mg by mouth 3 (Three) Times a Day., Disp: , Rfl:   •  hydrALAZINE (APRESOLINE) 25 MG tablet, Take 25 mg by mouth 3 (Three) Times a Day., Disp: , Rfl:   •  insulin aspart (novoLOG) 100 UNIT/ML injection, SLIDING SCALE, Disp: , Rfl:   •  Insulin Disposable Pump (V-GO 30) kit, Inject 1 Device under the skin., Disp: , Rfl:   •  ketoconazole (NIZORAL) 2 % shampoo, , Disp: , Rfl:   •  losartan (COZAAR) 100 MG tablet, Take 100 mg by mouth Daily., Disp: , Rfl:   •  losartan (COZAAR) 50 MG tablet, Take 50 mg by mouth Daily., Disp: , Rfl:   •  Magnesium Oxide (MAG-OXIDE PO), Take 1,200 mg by mouth., Disp: , Rfl:   •  memantine (NAMENDA XR) 28 MG capsule sustained-release 24 hr extended release capsule, Take 1 capsule by mouth Daily., Disp: , Rfl:   •  metoclopramide (REGLAN) 5 MG tablet, , Disp: , Rfl:   •  Morphine (MS CONTIN) 60 MG 12 hr tablet, Take 60 mg by mouth 2 (Two) Times a Day., Disp: , Rfl:   •  Multiple Vitamin (MULTIVITAMIN) tablet, Take 1 tablet by mouth Daily. HOLD PRIOR TO SURGERY, Disp: , Rfl:   •  mycophenolate (CELLCEPT) 250 MG capsule, Take 750 mg by mouth., Disp: , Rfl:   •  Naloxegol Oxalate (MOVANTIK) 25 MG tablet, Take 25 mg by mouth Daily As Needed., Disp: , Rfl:   •  omeprazole (priLOSEC) 40 MG capsule, Take 40 mg by mouth Every Morning., Disp: , Rfl:   •  ondansetron (ZOFRAN) 8 MG tablet, As Needed., Disp: , Rfl:   •  prednisoLONE acetate (PRED FORTE) 1 % ophthalmic suspension, INSTILL 1 DROP INTO RIGHT EYE TWICE DAILY FOR 2 WEEKS, Disp: , Rfl:   •  rivastigmine (EXELON) 6 MG capsule, TAKE ONE CAPSULE BY MOUTH TWICE A DAY, Disp: , Rfl:   •  rivastigmine (EXELON) 6 MG capsule, Take 1 capsule by mouth 2 (Two) Times a Day., Disp: , Rfl:   •  sennosides-docusate (PERICOLACE) 8.6-50 MG per tablet, Take 2 tablets  by mouth Daily., Disp: , Rfl:   •  spironolactone (ALDACTONE) 25 MG tablet, Take 25 mg by mouth Daily., Disp: , Rfl:   •  sulfamethoxazole-trimethoprim (BACTRIM DS,SEPTRA DS) 800-160 MG per tablet, Take 1 tablet by mouth 2 (Two) Times a Day., Disp: 10 tablet, Rfl: 0  •  tacrolimus (PROGRAF) 1 MG capsule, Take 1 mg by mouth., Disp: , Rfl:   •  tacrolimus (PROGRAF) 1 MG capsule, Take 1 capsule by mouth 2 (Two) Times a Day., Disp: , Rfl:   •  tamsulosin (FLOMAX) 0.4 MG capsule 24 hr capsule, Take 0.4 mg by mouth Daily. TAKES @ 1200 NOON, Disp: , Rfl:   •  Testosterone Cypionate (DEPOTESTOTERONE CYPIONATE) 200 MG/ML injection, , Disp: , Rfl:   •  ziconotide (PF) 25 mcg/mL, by Intrathecal route Continuous., Disp: 20 mL, Rfl: 0  •  ziconotide (PF) 25 mcg/mL, by Intrathecal route Continuous., Disp: 20 mL, Rfl: 0  •  ziconotide (PRIALT) 100 MCG/ML injection, by Intrathecal route., Disp: , Rfl:     Current Outpatient Medications on File Prior to Visit   Medication Sig Dispense Refill   • albuterol (PROVENTIL HFA;VENTOLIN HFA) 108 (90 Base) MCG/ACT inhaler Inhale 2 puffs Every 4 (Four) Hours As Needed for Wheezing.     • amLODIPine (NORVASC) 10 MG tablet      • aspirin 81 MG EC tablet Take 81 mg by mouth. HOLD PRIOR TO SURGERY     • atorvastatin (LIPITOR) 10 MG tablet      • atorvastatin (LIPITOR) 20 MG tablet      • baclofen (LIORESAL) 10 MG tablet Take 10 mg by mouth 3 (Three) Times a Day.     • busPIRone (BUSPAR) 15 MG tablet      • chlorthalidone (HYGROTON) 25 MG tablet Take 25 mg by mouth Every Morning.     • Cholecalciferol (VITAMIN D3) 2000 units tablet Take 1 tablet by mouth Every Evening. HOLD PRIOR TO SURGERY     • cyanocobalamin 1000 MCG/ML injection Inject 1,000 mcg into the shoulder, thigh, or buttocks Every 28 (Twenty-Eight) Days.     • dexamethasone (DECADRON) 0.5 MG tablet      • DULoxetine (CYMBALTA) 60 MG capsule Take 60 mg by mouth.     • hydrALAZINE (APRESOLINE) 100 MG tablet Take 100 mg by mouth 3 (Three)  Times a Day.     • hydrALAZINE (APRESOLINE) 25 MG tablet Take 25 mg by mouth 3 (Three) Times a Day.     • insulin aspart (novoLOG) 100 UNIT/ML injection SLIDING SCALE     • Insulin Disposable Pump (V-GO 30) kit Inject 1 Device under the skin.     • ketoconazole (NIZORAL) 2 % shampoo      • losartan (COZAAR) 100 MG tablet Take 100 mg by mouth Daily.     • losartan (COZAAR) 50 MG tablet Take 50 mg by mouth Daily.     • Magnesium Oxide (MAG-OXIDE PO) Take 1,200 mg by mouth.     • memantine (NAMENDA XR) 28 MG capsule sustained-release 24 hr extended release capsule Take 1 capsule by mouth Daily.     • metoclopramide (REGLAN) 5 MG tablet      • Morphine (MS CONTIN) 60 MG 12 hr tablet Take 60 mg by mouth 2 (Two) Times a Day.     • Multiple Vitamin (MULTIVITAMIN) tablet Take 1 tablet by mouth Daily. HOLD PRIOR TO SURGERY     • mycophenolate (CELLCEPT) 250 MG capsule Take 750 mg by mouth.     • Naloxegol Oxalate (MOVANTIK) 25 MG tablet Take 25 mg by mouth Daily As Needed.     • omeprazole (priLOSEC) 40 MG capsule Take 40 mg by mouth Every Morning.     • ondansetron (ZOFRAN) 8 MG tablet As Needed.     • prednisoLONE acetate (PRED FORTE) 1 % ophthalmic suspension INSTILL 1 DROP INTO RIGHT EYE TWICE DAILY FOR 2 WEEKS     • rivastigmine (EXELON) 6 MG capsule TAKE ONE CAPSULE BY MOUTH TWICE A DAY     • rivastigmine (EXELON) 6 MG capsule Take 1 capsule by mouth 2 (Two) Times a Day.     • sennosides-docusate (PERICOLACE) 8.6-50 MG per tablet Take 2 tablets by mouth Daily.     • spironolactone (ALDACTONE) 25 MG tablet Take 25 mg by mouth Daily.     • sulfamethoxazole-trimethoprim (BACTRIM DS,SEPTRA DS) 800-160 MG per tablet Take 1 tablet by mouth 2 (Two) Times a Day. 10 tablet 0   • tacrolimus (PROGRAF) 1 MG capsule Take 1 mg by mouth.     • tacrolimus (PROGRAF) 1 MG capsule Take 1 capsule by mouth 2 (Two) Times a Day.     • tamsulosin (FLOMAX) 0.4 MG capsule 24 hr capsule Take 0.4 mg by mouth Daily. TAKES @ 1200 NOON     •  Testosterone Cypionate (DEPOTESTOTERONE CYPIONATE) 200 MG/ML injection      • ziconotide (PF) 25 mcg/mL by Intrathecal route Continuous. 20 mL 0   • ziconotide (PF) 25 mcg/mL by Intrathecal route Continuous. 20 mL 0   • ziconotide (PRIALT) 100 MCG/ML injection by Intrathecal route.       No current facility-administered medications on file prior to visit.       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[facility])    • Osteoarthritis    • Peripheral neuropathy    • Sleep apnea     CPAP   • Stroke (cerebrum) ([facility]) 11/2022   • Vitamin B 12 deficiency        Past Surgical History:   Procedure Laterality Date   • APPENDECTOMY     • CARDIAC CATHETERIZATION      MULTIPLE   • CARDIAC SURGERY  1995    CLOT REMOVED   • CHEST SURGERY  11/1995    REPAIR CHEST BONE SURGERY   • CORONARY ARTERY BYPASS GRAFT      5 VESSELS   • GALLBLADDER SURGERY     • HEART TRANSPLANT     • HEART TRANSPLANT  06/17/2008   • HEART TRANSPLANT  2008   • KNEE ARTHROPLASTY Left 11/2006   • KNEE ARTHROSCOPY Left 1994   • KNEE CARTILAGE SURGERY Right 10/17/2017   • KNEE SURGERY Bilateral    • PAIN PUMP INSERTION/REVISION N/A 7/16/2021    Procedure: PAIN PUMP INSERTION, SPINAL CORD STIMULATOR REMOVAL PRIALT +++ LOW DOSE++;  Surgeon: Stevo Brian MD;  Location: [facility] MAIN OR;  Service: Pain Management;  Laterality: N/A;   • PAIN PUMP TRIAL INJECTION ONLY N/A 6/18/2021    Procedure: Injection Single for Pain Pump Trial;  Surgeon: Stevo Brian MD;  Location: [facility] MAIN OR;  Service: Pain  "Management;  Laterality: N/A;   • PROSTATE SURGERY      REPAIR   • SHOULDER SURGERY Right    • SPINAL CORD STIMULATOR IMPLANT N/A 4/20/2018    Procedure: SPINAL CORD STIMULATOR INSERTION PHASE 2;  Surgeon: Stevo Brian MD;  Location: [facility] OR;  Service: Pain Management   • TOTAL KNEE ARTHROPLASTY REVISION Left 10/30/2009   • TOTAL KNEE ARTHROPLASTY REVISION Right        No family history on file.    Social History     Socioeconomic History   • Marital status:    Tobacco Use   • Smoking status: Former   • Smokeless tobacco: Never   Vaping Use   • Vaping Use: Never used   Substance and Sexual Activity   • Alcohol use: No   • Drug use: No   • Sexual activity: Defer       -------        Review of Systems   Constitutional: Positive for activity change (dec) and fatigue (occ). Negative for fever.   HENT: Negative for congestion.    Eyes: Negative for visual disturbance.   Respiratory: Negative for cough and shortness of breath.    Cardiovascular: Negative for chest pain.   Gastrointestinal: Positive for nausea (hx gastroparesis) and vomiting.   Genitourinary: Negative for difficulty urinating.   Musculoskeletal: Positive for back pain.   Neurological: Negative for dizziness, weakness, light-headedness, numbness and headaches.   Psychiatric/Behavioral: Negative for agitation, sleep disturbance and suicidal ideas. The patient is not nervous/anxious.        Vitals:    11/21/22 1547   BP: 173/100  Comment: pt sts had bp meds today   Pulse: 82   Resp: 20   Temp: 97.8 °F (36.6 °C)   SpO2: 94%   Weight: 81.2 kg (179 lb)   Height: 180.3 cm (70.98\")   PainSc: 0-No pain   PainLoc: Back         Objective   Physical Exam  VSS, NNR, NCAT, NMNA, NRD, AAOx3.  He does not look like he feels well but not toxic.      Assessment & Plan   Diagnoses and all orders for this visit:    1. Presence of intrathecal pump (Primary)    2. Spinal stenosis of lumbar region, unspecified whether neurogenic claudication " present      Extended office visit today as we had a lot to discuss.  This was a 37-minute office visit dedicated to the discussion below, in addition to the additional time for the pump management which is again in addition to that 37 minutes of discussion.  All of his questions and those of his spouse were answered to their satisfaction.    We talked about his new MRI report from October 2022, compared the radiologist findings to radiologist findings from the report of the CT scan from 2014.  While it was 2 different radiologist reading then I will have the images available it does seem that there is documentation of interval progression with noted multilevel foraminal narrowings.  Also some moderate canal narrowing noted in the mid lumbar area centrally.    We talked about other options namely what surgical repair would look like in I expressed to him that I do not think it would likely be in his best interest to consider surgery because of the complexity of multiple foraminotomies versus the decision of whether to just try a laminotomy or laminectomy.  This is weighed against his multiple medical comorbidities.    We talked about gastroparesis, and I expressed my concern about high range morphine in the setting of gastroparesis.  He will talk to the prescriber and may consider lowering the dose.    We talked about use of opioid and the pump therapy, versus even a consideration of a mix of ziconotide and opiate.  I expressed to him that mixed therapies like this are difficult to get covered from insurance standpoint because of the off label use of the therapy.  Also expressed to him that use of some intrathecal opiate may be advantageous with regards to his GI tract function versus the oral morphine.    --- Follow-up for next refill           WILDER REPORT  As part of the patient's treatment plan, I am prescribing controlled substances. The patient has been made aware of appropriate use of such medications,  including potential risk of somnolence, limited ability to drive and/or work safely, and the potential for dependence or overdose. It has also been made clear that these medications are for use by this patient only, without concomitant use of alcohol or other substances unless prescribed.     Patient has completed prescribing agreement detailing terms of continued prescribing of controlled substances, including monitoring WILDER reports, urine drug screening, and pill counts if necessary. The patient is aware that inappropriate use will results in cessation of prescribing such medications.    As the clinician, I personally reviewed the WILDER from as above while the patient was in the office today.    History and physical exam exhibit continued safe and appropriate use of controlled substances.       Dictated utilizing Dragon dictation.       Patient remained masked during entire encounter. No cough present. I donned a mask and eye protection throughout entire visit. Prior to donning mask and eye protection, hand hygiene was performed, as well as when it was doffed.  I was closer than 6 feet, but not for an extended period of time. No obvious exposure to any bodily fluids.    --      Vitals:    11/21/22 1547   PainSc: 0-No pain   PainLoc: Back          Saurav Cotto Sr. reports a pain score of 0.  Given his pain assessment as noted, treatment options were discussed and the following options were decided upon as a follow-up plan to address the patient's pain: prescription for non-opiod analgesics.

## 2022-12-05 ENCOUNTER — OFFICE (OUTPATIENT)
Dept: URBAN - METROPOLITAN AREA CLINIC 76 | Facility: CLINIC | Age: 77
End: 2022-12-05

## 2022-12-05 VITALS
DIASTOLIC BLOOD PRESSURE: 82 MMHG | HEART RATE: 90 BPM | WEIGHT: 181 LBS | HEIGHT: 70 IN | OXYGEN SATURATION: 94 % | SYSTOLIC BLOOD PRESSURE: 177 MMHG

## 2022-12-05 DIAGNOSIS — K31.84 GASTROPARESIS: ICD-10-CM

## 2022-12-05 DIAGNOSIS — Z86.010 PERSONAL HISTORY OF COLONIC POLYPS: ICD-10-CM

## 2022-12-05 DIAGNOSIS — K21.9 GASTRO-ESOPHAGEAL REFLUX DISEASE WITHOUT ESOPHAGITIS: ICD-10-CM

## 2022-12-05 PROCEDURE — 99214 OFFICE O/P EST MOD 30 MIN: CPT | Performed by: INTERNAL MEDICINE

## 2022-12-05 RX ORDER — ONDANSETRON HYDROCHLORIDE 4 MG/1
TABLET, FILM COATED ORAL
Qty: 50 | Refills: 6 | Status: ACTIVE
Start: 2022-12-05

## 2023-02-20 ENCOUNTER — OFFICE VISIT (OUTPATIENT)
Dept: PAIN MEDICINE | Facility: CLINIC | Age: 78
End: 2023-02-20
Payer: MEDICARE

## 2023-02-20 VITALS
TEMPERATURE: 98 F | WEIGHT: 174.2 LBS | OXYGEN SATURATION: 98 % | HEART RATE: 81 BPM | HEIGHT: 71 IN | DIASTOLIC BLOOD PRESSURE: 94 MMHG | BODY MASS INDEX: 24.39 KG/M2 | SYSTOLIC BLOOD PRESSURE: 170 MMHG

## 2023-02-20 DIAGNOSIS — M48.061 SPINAL STENOSIS OF LUMBAR REGION, UNSPECIFIED WHETHER NEUROGENIC CLAUDICATION PRESENT: ICD-10-CM

## 2023-02-20 DIAGNOSIS — Z97.8 PRESENCE OF INTRATHECAL PUMP: Primary | ICD-10-CM

## 2023-02-20 DIAGNOSIS — M54.16 LUMBAR RADICULOPATHY: ICD-10-CM

## 2023-02-20 PROCEDURE — 62369 ANAL SP INF PMP W/REPRG&FILL: CPT | Performed by: ANESTHESIOLOGY

## 2023-02-20 RX ORDER — CLOPIDOGREL BISULFATE 75 MG/1
1 TABLET ORAL DAILY
COMMUNITY
Start: 2022-11-22 | End: 2023-02-22 | Stop reason: SDUPTHER

## 2023-02-20 RX ORDER — CLONIDINE 0.3 MG/24H
1 PATCH, EXTENDED RELEASE TRANSDERMAL WEEKLY
COMMUNITY

## 2023-02-20 NOTE — PROGRESS NOTES
CHIEF COMPLAINT  F/U back pain- patient states that his pain has remained the same since his last visit.     Subjective   Saurav Cotto Sr. is a 78 y.o. male  who presents for follow-up.  He has a history of back pain    Strong desire to decrease opioid use, due ot gastroparesis.          History of Present Illness     High level of pain at the time of getting out of bed.      PEG Assessment   What number best describes your pain on average in the past week?5  What number best describes how, during the past week, pain has interfered with your enjoyment of life?10  What number best describes how, during the past week, pain has interfered with your general activity?  10    --  The aforementioned information the Chief Complaint section and above subjective data including any HPI data, and also the Review of Systems data, has been personally reviewed and affirmed.  --        Review of Pertinent Medical Data ---  E-kathryn report is reviewed:  I reviewed the document in the electronic form under the PDMP tab in the Epic EMR...  - In this function, the report is a current report in as close to real-time as possible.  - The report was available for immediate review.    - I did jesús the report as reviewed.  - There is concern for aberrant behavior based on this ekasper review.      The following portions of the patient's history were reviewed and updated as appropriate: allergies, current medications, past family history, past medical history, past social history, past surgical history and problem list.    Review of Systems   Constitutional: Positive for fatigue. Negative for activity change, chills and fever.   HENT: Negative for congestion.    Eyes: Negative for visual disturbance.   Respiratory: Positive for shortness of breath. Negative for chest tightness.    Cardiovascular: Negative for chest pain.   Gastrointestinal: Positive for constipation. Negative for abdominal pain and diarrhea.   Genitourinary: Negative for  "difficulty urinating and dysuria.   Musculoskeletal: Positive for back pain.   Neurological: Positive for weakness, light-headedness and headaches. Negative for dizziness and numbness.   Psychiatric/Behavioral: Negative for agitation and sleep disturbance. The patient is not nervous/anxious.        Vitals:    02/20/23 1352   BP: 170/94   Pulse: 81   Temp: 98 °F (36.7 °C)   SpO2: 98%   Weight: 79 kg (174 lb 3.2 oz)   Height: 180.3 cm (71\")   PainSc:   4   PainLoc: Back         Objective   Physical Exam  VSS, NNR, NCAT, NMNA, NRD, AAOx3.      ----       Intrathecal pump refill and reprogramming.  The patient is running ziconotide at the stock concentration of 25 mcg/mL.  Expected residual volume was 14 mL in the actual residual volume was at 14ml.  Patient has been running simple continuous dose of 1.375 mcg/day and the pump was refilled with 20 mL of ziconotide at the same concentration and was continued at the same baseline but with the addition of a 0.1 mcg dose over 30 minutes at 5:30 am.  This pump is a 20 mL Medtronic SynchroMed 2 pump with an estimated replacement March 2028.  Catheter tip is at T9.    ----        Assessment & Plan   Diagnoses and all orders for this visit:    1. Presence of intrathecal pump (Primary)    2. Lumbar radiculopathy    3. Spinal stenosis of lumbar region, unspecified whether neurogenic claudication present        --- Follow-up for next refill           WILDER REPORT  As part of the patient's treatment plan, I am prescribing controlled substances. The patient has been made aware of appropriate use of such medications, including potential risk of somnolence, limited ability to drive and/or work safely, and the potential for dependence or overdose. It has also been made clear that these medications are for use by this patient only, without concomitant use of alcohol or other substances unless prescribed.     Patient has completed prescribing agreement detailing terms of continued " prescribing of controlled substances, including monitoring WILDER reports, urine drug screening, and pill counts if necessary. The patient is aware that inappropriate use will results in cessation of prescribing such medications.    As the clinician, I personally reviewed the WILDER from as above while the patient was in the office today.    History and physical exam exhibit continued safe and appropriate use of controlled substances.       Dictated utilizing Dragon dictation.       Patient remained masked during entire encounter. No cough present. I donned a mask and eye protection throughout entire visit. Prior to donning mask and eye protection, hand hygiene was performed, as well as when it was doffed.  I was closer than 6 feet, but not for an extended period of time. No obvious exposure to any bodily fluids.    ---      Vitals:    02/20/23 1352   PainSc:   4   PainLoc: Back          Saurav Cotto Sr. reports a pain score of 4.  Given his pain assessment as noted, treatment options were discussed and the following options were decided upon as a follow-up plan to address the patient's pain: continuation of current treatment plan for pain.

## 2023-02-21 ENCOUNTER — OFFICE VISIT (OUTPATIENT)
Dept: NEUROLOGY | Facility: CLINIC | Age: 78
End: 2023-02-21
Payer: MEDICARE

## 2023-02-21 VITALS
DIASTOLIC BLOOD PRESSURE: 84 MMHG | SYSTOLIC BLOOD PRESSURE: 148 MMHG | BODY MASS INDEX: 24.64 KG/M2 | OXYGEN SATURATION: 95 % | WEIGHT: 176 LBS | HEIGHT: 71 IN | HEART RATE: 83 BPM

## 2023-02-21 DIAGNOSIS — G31.84 MCI (MILD COGNITIVE IMPAIRMENT) WITH MEMORY LOSS: Primary | ICD-10-CM

## 2023-02-21 DIAGNOSIS — G44.85 IDIOPATHIC STABBING HEADACHE: ICD-10-CM

## 2023-02-21 DIAGNOSIS — Z86.73 HISTORY OF TIA (TRANSIENT ISCHEMIC ATTACK): ICD-10-CM

## 2023-02-21 PROCEDURE — 99215 OFFICE O/P EST HI 40 MIN: CPT | Performed by: PSYCHIATRY & NEUROLOGY

## 2023-02-21 RX ORDER — MEMANTINE HYDROCHLORIDE 28 MG/1
28 CAPSULE, EXTENDED RELEASE ORAL DAILY
Qty: 90 CAPSULE | Refills: 3 | Status: SHIPPED | OUTPATIENT
Start: 2023-02-21

## 2023-02-21 RX ORDER — RIVASTIGMINE TARTRATE 6 MG/1
6 CAPSULE ORAL 2 TIMES DAILY
Qty: 180 CAPSULE | Refills: 3 | Status: SHIPPED | OUTPATIENT
Start: 2023-02-21

## 2023-02-21 NOTE — PROGRESS NOTES
Notes by MA:  Patient presents today for MCI and stroke follow up. Patient presents today with their wife, Catia and has given consent to give health information to them. Pt and wife sts they believe pt had a stroke on 11/06/22. Pt became weak, confused and had difficulties with reading. Pt followed up with Cherrie Mohr and she ordered a MRI. MRI did show an old stroke, but nothing new. Per wife, Linn told them she believed it was a TIA. Wife and  sts last night he had a severe stabbing pain in his left eye, lasting only seconds. Pt sts he has had this before in the right eye, but not for several months.  Pt believes his memory has declines since last ov.        Subjective:     Patient ID: Saurav Cotto Sr. is a 78 y.o. male.    History of Present Illness  The following portions of the patient's history were reviewed and updated as appropriate: allergies, current medications, past family history, past medical history, past social history, past surgical history and problem list.    Review of Systems   Constitutional: Positive for fatigue.   Gastrointestinal: Positive for nausea.   Musculoskeletal: Positive for gait problem.   Neurological: Positive for weakness (general and legs) and headaches. Negative for dizziness, tremors, seizures, syncope, facial asymmetry, speech difficulty, light-headedness and numbness.   Hematological: Does not bruise/bleed easily.   Psychiatric/Behavioral: Positive for confusion and decreased concentration. Negative for agitation, behavioral problems, dysphoric mood, hallucinations, self-injury, sleep disturbance and suicidal ideas. The patient is not nervous/anxious and is not hyperactive.         Objective:    Neurologic Exam  Awake alert pleasant cooperative with fluent speech and normal speech comprehension.  Multiple cognitive screens were performed.  He once again scored normally as noted in the associated cognitive screens.  Cranial nerves II through XII normal and  symmetric.  The motor exam continues to reveal symmetric tone bulk and power appropriate for age and activity level.  The sensory exam reveals reduced decreased sensation to temperature bilaterally.  He continues to demonstrate a positive Romberg and a wide-based gait.  Tendon reflexes are diffusely absent.  Physical Exam    Assessment/Plan:     Diagnoses and all orders for this visit:    1. MCI (mild cognitive impairment) with memory loss (Primary)    2. History of TIA (transient ischemic attack)    3. Idiopathic stabbing headache    Other orders  -     rivastigmine (EXELON) 6 MG capsule; Take 1 capsule by mouth 2 (Two) Times a Day.  Dispense: 180 capsule; Refill: 3  -     memantine (NAMENDA XR) 28 MG capsule sustained-release 24 hr extended release capsule; Take 1 capsule by mouth Daily.  Dispense: 90 capsule; Refill: 3     78-year-old gentleman caring the diagnosis of mild cognitive impairment after evaluation at [facility] where he was placed on the combination of Exelon and extended release Namenda.  His cognitive screens normalized after that and have remained stable including today's examination.  Tolerating the medications well.  I reassured him that he is doing well and no injuries were clearly indicated today.  From a stroke/TIA standpoint he has been started on Plavix in addition to his aspirin (which she is on for cardiac reasons).  He is complaining of intermittent stabbing headaches, usually in the eyes with a shifting predominance between left and right, likely consistent with idiopathic stabbing headache.  He has already had appropriate brain imaging.  It does not happen frequently enough to warrant prophylactic therapy and is too short for acute therapy.  I discussed that with him in detail.  We will see him back in 1 year, earlier if necessary.    45 minutes patient care time today.

## 2023-02-22 RX ORDER — CLOPIDOGREL BISULFATE 75 MG/1
75 TABLET ORAL DAILY
Qty: 30 TABLET | Refills: 3 | Status: SHIPPED | OUTPATIENT
Start: 2023-02-22

## 2023-02-22 NOTE — TELEPHONE ENCOUNTER
Caller: EWELINA Mustafa call back number: [phone]      Requested Prescriptions:  PLAVIX 75 MG   Requested Prescriptions      No prescriptions requested or ordered in this encounter        Pharmacy where request should be sent: [facility] [address] [place] [address] [facility] - [phone]  - [phone]   [phone]     Additional details provided by patient: PT NEED RX SENT TO PHARMACY .     Does the patient have less than a 3 day supply:  [x] Yes  [] No    Would you like a call back once the refill request has been completed: [] Yes [] No    If the office needs to give you a call back, can they leave a voicemail: [] Yes [] No    Jan Woody Rep   02/22/23 15:25 EST

## 2023-03-03 ENCOUNTER — TELEPHONE (OUTPATIENT)
Dept: NEUROLOGY | Facility: CLINIC | Age: 78
End: 2023-03-03
Payer: MEDICARE

## 2023-03-03 NOTE — TELEPHONE ENCOUNTER
Caller: CRYSTAL FROM PCP OFFICE      Shahla Pierre MD     PCP - General, Family Medicine     Since 3/9/2018     [phone]       What form or medical record are you requesting: LAST OV NOTE    Who is requesting this form or medical record from you: PCP OFFICE     How would you like to receive the form or medical records (pick-up, mail, fax): [phone]    Timeframe paperwork needed: SOON    Additional notes: PT WAS COMPLAINING OF CLUSTER HEADACHES TO PCP, SHE WAS NOT AWARE OF APPOINTMENT FROM 2-21-23 , THEY ARE WONDERING IF PT SHOULD BE SEEN BEFORE THE 1 YEAR FOLLOW UP. PT ADVISED THEM HE WAS NOT TREATED FOR HEADACHES AT OUR VISIT.

## 2023-03-08 NOTE — TELEPHONE ENCOUNTER
Called and spoke with wife. Wife sts pt is doing better, that Dr Pierre believes pt is having cluster headaches. I offered to schedule pt for evaluation, but wife sts they would like to hold off scheduling and will call back if he begins the experience these headaches again.

## 2023-05-08 ENCOUNTER — OFFICE VISIT (OUTPATIENT)
Dept: PAIN MEDICINE | Facility: CLINIC | Age: 78
End: 2023-05-08
Payer: MEDICARE

## 2023-05-08 VITALS
HEART RATE: 86 BPM | OXYGEN SATURATION: 97 % | TEMPERATURE: 98.2 F | WEIGHT: 169.4 LBS | SYSTOLIC BLOOD PRESSURE: 177 MMHG | HEIGHT: 71 IN | DIASTOLIC BLOOD PRESSURE: 93 MMHG | BODY MASS INDEX: 23.72 KG/M2

## 2023-05-08 DIAGNOSIS — Z97.8 PRESENCE OF INTRATHECAL PUMP: Primary | ICD-10-CM

## 2023-05-08 DIAGNOSIS — M48.061 SPINAL STENOSIS OF LUMBAR REGION, UNSPECIFIED WHETHER NEUROGENIC CLAUDICATION PRESENT: ICD-10-CM

## 2023-05-08 DIAGNOSIS — M54.16 LUMBAR RADICULOPATHY: ICD-10-CM

## 2023-05-08 NOTE — PROGRESS NOTES
CHIEF COMPLAINT  F/U back pain- patient states that his pain has remained the same since his last visit. He states that a new pain that started yesterday which is radiating down his right leg.     Subjective   Saurav Cotto . is a 78 y.o. male  who presents for follow-up.  He has a history of back pain.    Intrathecal pump refill and reprogramming.  The patient is running ziconotide at the stock concentration of 25 mcg/mL.  Expected residual volume was 15.7 mL in the actual residual volume was at 15ml.  Patient has been running simple continuous dose plus bolus...the pump was refilled with 20 mL of ziconotide at the same concentration and was continued at the with 1.411mcg/day baseline but with the addition of a 0.1 mcg dose over 30 minutes at 5:30 am.  Total daily dose is now 1.512mcg/day.  This pump is a 20 mL Medtronic SynchroMed 2 pump with an estimated replacement March 2028.  Catheter tip is at T9.      History of Present Illness     Overall he has been having some more pain.  He does hope to continue to wean morphine.  We had previously in discussion with some increasing pain and we may need to increase his dose of intrathecal ziconotide.  He may consider slight dose adjustment again in a couple weeks.  I think some very slow titration upwards may be very reasonable, not making changes more any more frequently than once weekly in order to try to minimize side effect profile.    PEG Assessment   What number best describes your pain on average in the past week?5  What number best describes how, during the past week, pain has interfered with your enjoyment of life?4  What number best describes how, during the past week, pain has interfered with your general activity?  4    --  The aforementioned information the Chief Complaint section and above subjective data including any HPI data, and also the Review of Systems data, has been personally reviewed and affirmed.  --        Review of Pertinent Medical Data  ---  E-kathryn report is reviewed:  I reviewed the document in the electronic form under the PDMP tab in the Epic EMR...  - In this function, the report is a current report in as close to real-time as possible.  - The report was not available for immediate review.    - I did jesús the report as reviewed.  - There is not pertinent data on the last page of the report.      The following portions of the patient's history were reviewed and updated as appropriate: allergies, current medications, past family history, past medical history, past social history, past surgical history and problem list.    -------    The following portions of the patient's history were reviewed and updated as appropriate: allergies, current medications, past family history, past medical history, past social history, past surgical history and problem list.    Allergies   Allergen Reactions   • Coreg [Carvedilol] Hives   • Singulair [Montelukast Sodium] Other (See Comments)     weakness   • Theophyllines Other (See Comments)     weakness         Current Outpatient Medications:   •  albuterol (PROVENTIL HFA;VENTOLIN HFA) 108 (90 Base) MCG/ACT inhaler, Inhale 2 puffs Every 4 (Four) Hours As Needed for Wheezing., Disp: , Rfl:   •  amLODIPine (NORVASC) 10 MG tablet, , Disp: , Rfl:   •  aspirin 81 MG EC tablet, Take 1 tablet by mouth. HOLD PRIOR TO SURGERY, Disp: , Rfl:   •  atorvastatin (LIPITOR) 10 MG tablet, , Disp: , Rfl:   •  busPIRone (BUSPAR) 15 MG tablet, 10 mg., Disp: , Rfl:   •  chlorthalidone (HYGROTON) 25 MG tablet, Take 1 tablet by mouth Every Morning., Disp: , Rfl:   •  Cholecalciferol (VITAMIN D3) 2000 units tablet, Take 1 tablet by mouth Every Evening. HOLD PRIOR TO SURGERY, Disp: , Rfl:   •  cloNIDine (CATAPRES-TTS) 0.3 MG/24HR patch, Place 1 patch on the skin as directed by provider 1 (One) Time Per Week., Disp: , Rfl:   •  clopidogrel (PLAVIX) 75 MG tablet, Take 1 tablet by mouth Daily., Disp: 30 tablet, Rfl: 3  •  cyanocobalamin  1000 MCG/ML injection, Inject 1 mL into the appropriate muscle as directed by prescriber Every 28 (Twenty-Eight) Days., Disp: , Rfl:   •  DULoxetine (CYMBALTA) 60 MG capsule, Take 1 capsule by mouth., Disp: , Rfl:   •  hydrALAZINE (APRESOLINE) 100 MG tablet, Take 1 tablet by mouth 3 (Three) Times a Day., Disp: , Rfl:   •  insulin aspart (novoLOG) 100 UNIT/ML injection, SLIDING SCALE, Disp: , Rfl:   •  Insulin Disposable Pump (V-GO 30) kit, Inject 1 Device under the skin., Disp: , Rfl:   •  ketoconazole (NIZORAL) 2 % shampoo, , Disp: , Rfl:   •  Magnesium Oxide (MAG-OXIDE PO), Take 1,200 mg by mouth., Disp: , Rfl:   •  memantine (NAMENDA XR) 28 MG capsule sustained-release 24 hr extended release capsule, Take 1 capsule by mouth Daily., Disp: 90 capsule, Rfl: 3  •  Morphine (MS CONTIN) 60 MG 12 hr tablet, Take 1 tablet by mouth 2 (Two) Times a Day. Taking 60 mg in the Am and 30 mg in the evening., Disp: , Rfl:   •  Multiple Vitamin (MULTIVITAMIN) tablet, Take 1 tablet by mouth Daily. HOLD PRIOR TO SURGERY, Disp: , Rfl:   •  mycophenolate (CELLCEPT) 250 MG capsule, Take 3 capsules by mouth., Disp: , Rfl:   •  Naloxegol Oxalate (MOVANTIK) 25 MG tablet, Take 1 tablet by mouth Daily As Needed., Disp: , Rfl:   •  omeprazole (priLOSEC) 40 MG capsule, Take 1 capsule by mouth Every Morning., Disp: , Rfl:   •  ondansetron (ZOFRAN) 8 MG tablet, As Needed., Disp: , Rfl:   •  rivastigmine (EXELON) 6 MG capsule, Take 1 capsule by mouth 2 (Two) Times a Day., Disp: 180 capsule, Rfl: 3  •  sennosides-docusate (PERICOLACE) 8.6-50 MG per tablet, Take 2 tablets by mouth Daily., Disp: , Rfl:   •  spironolactone (ALDACTONE) 25 MG tablet, Take 1 tablet by mouth Daily., Disp: , Rfl:   •  tacrolimus (PROGRAF) 1 MG capsule, Take 1 capsule by mouth., Disp: , Rfl:   •  tamsulosin (FLOMAX) 0.4 MG capsule 24 hr capsule, Take 1 capsule by mouth Daily. TAKES @ 1200 NOON, Disp: , Rfl:   •  Unable to find, Take 1 each by mouth 3 (Three) Times a Day.  Med Name: domeperidone 10 mg, Disp: , Rfl:   •  ziconotide (PF) 25 mcg/mL, by Intrathecal route Continuous., Disp: 20 mL, Rfl: 0    Current Outpatient Medications on File Prior to Visit   Medication Sig Dispense Refill   • albuterol (PROVENTIL HFA;VENTOLIN HFA) 108 (90 Base) MCG/ACT inhaler Inhale 2 puffs Every 4 (Four) Hours As Needed for Wheezing.     • amLODIPine (NORVASC) 10 MG tablet      • aspirin 81 MG EC tablet Take 1 tablet by mouth. HOLD PRIOR TO SURGERY     • atorvastatin (LIPITOR) 10 MG tablet      • busPIRone (BUSPAR) 15 MG tablet 10 mg.     • chlorthalidone (HYGROTON) 25 MG tablet Take 1 tablet by mouth Every Morning.     • Cholecalciferol (VITAMIN D3) 2000 units tablet Take 1 tablet by mouth Every Evening. HOLD PRIOR TO SURGERY     • cloNIDine (CATAPRES-TTS) 0.3 MG/24HR patch Place 1 patch on the skin as directed by provider 1 (One) Time Per Week.     • clopidogrel (PLAVIX) 75 MG tablet Take 1 tablet by mouth Daily. 30 tablet 3   • cyanocobalamin 1000 MCG/ML injection Inject 1 mL into the appropriate muscle as directed by prescriber Every 28 (Twenty-Eight) Days.     • DULoxetine (CYMBALTA) 60 MG capsule Take 1 capsule by mouth.     • hydrALAZINE (APRESOLINE) 100 MG tablet Take 1 tablet by mouth 3 (Three) Times a Day.     • insulin aspart (novoLOG) 100 UNIT/ML injection SLIDING SCALE     • Insulin Disposable Pump (V-GO 30) kit Inject 1 Device under the skin.     • ketoconazole (NIZORAL) 2 % shampoo      • Magnesium Oxide (MAG-OXIDE PO) Take 1,200 mg by mouth.     • memantine (NAMENDA XR) 28 MG capsule sustained-release 24 hr extended release capsule Take 1 capsule by mouth Daily. 90 capsule 3   • Morphine (MS CONTIN) 60 MG 12 hr tablet Take 1 tablet by mouth 2 (Two) Times a Day. Taking 60 mg in the Am and 30 mg in the evening.     • Multiple Vitamin (MULTIVITAMIN) tablet Take 1 tablet by mouth Daily. HOLD PRIOR TO SURGERY     • mycophenolate (CELLCEPT) 250 MG capsule Take 3 capsules by mouth.     •  Naloxegol Oxalate (MOVANTIK) 25 MG tablet Take 1 tablet by mouth Daily As Needed.     • omeprazole (priLOSEC) 40 MG capsule Take 1 capsule by mouth Every Morning.     • ondansetron (ZOFRAN) 8 MG tablet As Needed.     • rivastigmine (EXELON) 6 MG capsule Take 1 capsule by mouth 2 (Two) Times a Day. 180 capsule 3   • sennosides-docusate (PERICOLACE) 8.6-50 MG per tablet Take 2 tablets by mouth Daily.     • spironolactone (ALDACTONE) 25 MG tablet Take 1 tablet by mouth Daily.     • tacrolimus (PROGRAF) 1 MG capsule Take 1 capsule by mouth.     • tamsulosin (FLOMAX) 0.4 MG capsule 24 hr capsule Take 1 capsule by mouth Daily. TAKES @ 1200 NOON     • Unable to find Take 1 each by mouth 3 (Three) Times a Day. Med Name: domeperidone 10 mg     • ziconotide (PF) 25 mcg/mL by Intrathecal route Continuous. 20 mL 0     No current facility-administered medications on file prior to visit.       Patient Active Problem List   Diagnosis   • Lumbar radiculopathy   • Spinal stenosis of lumbar region   • Other chronic pain   • S/P insertion of intrathecal pump   • Presence of intrathecal pump   • MCI (mild cognitive impairment) with memory loss   • History of TIA (transient ischemic attack)   • Idiopathic stabbing headache       Past Medical History:   Diagnosis Date   • Acid reflux    • Anxiety    • Asthma    • Chronic back pain    • Depression    • Hypertension    • Joint pain    • Low back pain    • MCI (mild cognitive impairment) with memory loss    • Myocardial infarct 1991, 1994, 1995, 2002, 2005,   • Neuropathy in diabetes    • Osteoarthritis    • Peripheral neuropathy    • Sleep apnea     CPAP   • Stroke (cerebrum) 11/2022   • Vitamin B 12 deficiency        Past Surgical History:   Procedure Laterality Date   • APPENDECTOMY     • CARDIAC CATHETERIZATION      MULTIPLE   • CARDIAC SURGERY  1995    CLOT REMOVED   • CHEST SURGERY  11/1995    REPAIR CHEST BONE SURGERY   • CORONARY ARTERY BYPASS GRAFT      5 VESSELS   • GALLBLADDER  SURGERY     • HEART TRANSPLANT     • HEART TRANSPLANT  06/17/2008   • HEART TRANSPLANT  2008   • KNEE ARTHROPLASTY Left 11/2006   • KNEE ARTHROSCOPY Left 1994   • KNEE CARTILAGE SURGERY Right 10/17/2017   • KNEE SURGERY Bilateral    • PAIN PUMP INSERTION/REVISION N/A 7/16/2021    Procedure: PAIN PUMP INSERTION, SPINAL CORD STIMULATOR REMOVAL PRIALT +++ LOW DOSE++;  Surgeon: Stevo Brian MD;  [facility];  Service: Pain Management;  Laterality: N/A;   • PAIN PUMP TRIAL INJECTION ONLY N/A 6/18/2021    Procedure: Injection Single for Pain Pump Trial;  Surgeon: Stevo Brian MD;  [facility];  Service: Pain Management;  Laterality: N/A;   • PROSTATE SURGERY      REPAIR   • SHOULDER SURGERY Right    • SPINAL CORD STIMULATOR IMPLANT N/A 4/20/2018    Procedure: SPINAL CORD STIMULATOR INSERTION PHASE 2;  Surgeon: Stevo Brian MD;  Location: [facility] MAIN OR;  Service: Pain Management   • TOTAL KNEE ARTHROPLASTY REVISION Left 10/30/2009   • TOTAL KNEE ARTHROPLASTY REVISION Right        Family History   Problem Relation Age of Onset   • Stroke Mother    • Dementia Mother    • Arthritis Mother    • Cancer Mother    • Diabetes Mother    • Hypertension Mother    • Heart disease Mother    • Hyperlipidemia Mother    • Thyroid disease Mother    • Heart disease Father    • Hyperlipidemia Father    • Hypertension Father    • Arthritis Father        Social History     Socioeconomic History   • Marital status:    Tobacco Use   • Smoking status: Former   • Smokeless tobacco: Never   Vaping Use   • Vaping Use: Never used   Substance and Sexual Activity   • Alcohol use: No   • Drug use: No   • Sexual activity: Defer       -------        Review of Systems   Constitutional: Positive for fatigue. Negative for activity change, chills and fever.   HENT: Negative for congestion.    Eyes: Negative for visual disturbance.   Respiratory: Negative for chest tightness and shortness of breath.   "  Cardiovascular: Negative for chest pain.   Gastrointestinal: Positive for constipation. Negative for abdominal pain and diarrhea.   Genitourinary: Negative for difficulty urinating and dysuria.   Musculoskeletal: Positive for back pain.   Neurological: Positive for weakness and headaches. Negative for dizziness, light-headedness and numbness.   Psychiatric/Behavioral: Negative for agitation and sleep disturbance. The patient is not nervous/anxious.        Vitals:    05/08/23 1424   BP: 177/93   Pulse: 86   Temp: 98.2 °F (36.8 °C)   SpO2: 97%   Weight: 76.8 kg (169 lb 6.4 oz)   Height: 180.3 cm (71\")   PainSc:   9   PainLoc: Back         Objective   Physical Exam  VSS, NNR, NCAT, NMNA, NRD, AAOx3.  Pump site looks clean and clear.      Assessment & Plan   Diagnoses and all orders for this visit:    1. Presence of intrathecal pump (Primary)    2. Spinal stenosis of lumbar region, unspecified whether neurogenic claudication present    3. Lumbar radiculopathy        --- Follow-up for next pump refill as scheduled.  -- If he like to titrate further upwards we could do that in 1 week or 2.         WILDER REPORT  WILDER report has been reviewed and scanned into the patient's chart.  Date of last WILDER : as above.  Our practice is not managing his prescription of opioids.           Dictated utilizing Dragon dictation.     ---      Vitals:    05/08/23 1424   PainSc:   9   PainLoc: Back          Saurav Cotto Sr. reports a pain score of 9.  Given his pain assessment as noted, treatment options were discussed and the following options were decided upon as a follow-up plan to address the patient's pain: continuation of current treatment plan for pain.    "

## 2023-07-06 ENCOUNTER — OFFICE (OUTPATIENT)
Dept: URBAN - METROPOLITAN AREA CLINIC 76 | Facility: CLINIC | Age: 78
End: 2023-07-06

## 2023-07-06 VITALS
DIASTOLIC BLOOD PRESSURE: 62 MMHG | OXYGEN SATURATION: 96 % | HEART RATE: 77 BPM | SYSTOLIC BLOOD PRESSURE: 114 MMHG | HEIGHT: 70 IN | WEIGHT: 169 LBS

## 2023-07-06 DIAGNOSIS — R94.8 ABNORMAL RESULTS OF FUNCTION STUDIES OF OTHER ORGANS AND SYS: ICD-10-CM

## 2023-07-06 DIAGNOSIS — K31.84 GASTROPARESIS: ICD-10-CM

## 2023-07-06 DIAGNOSIS — K59.00 CONSTIPATION, UNSPECIFIED: ICD-10-CM

## 2023-07-06 DIAGNOSIS — Z86.010 PERSONAL HISTORY OF COLONIC POLYPS: ICD-10-CM

## 2023-07-06 DIAGNOSIS — K21.9 GASTRO-ESOPHAGEAL REFLUX DISEASE WITHOUT ESOPHAGITIS: ICD-10-CM

## 2023-07-06 PROCEDURE — 99214 OFFICE O/P EST MOD 30 MIN: CPT | Performed by: INTERNAL MEDICINE

## 2023-07-28 ENCOUNTER — TELEPHONE (OUTPATIENT)
Dept: PAIN MEDICINE | Facility: CLINIC | Age: 78
End: 2023-07-28

## 2023-07-28 NOTE — TELEPHONE ENCOUNTER
Caller: EWELINA LEWIS     Relationship to patient: WIFE    Best call back number: [phone]    Patient is needing: THEY  MOVED PATIENT SX UP TO 08/02 AND HE WILL NEED HIS PAIN PUMP FILLED BEFORE THEN     UNABLE TO WARM TRANSFER

## 2023-07-31 ENCOUNTER — OFFICE VISIT (OUTPATIENT)
Dept: PAIN MEDICINE | Facility: CLINIC | Age: 78
End: 2023-07-31
Payer: MEDICARE

## 2023-07-31 VITALS
BODY MASS INDEX: 23.24 KG/M2 | DIASTOLIC BLOOD PRESSURE: 80 MMHG | RESPIRATION RATE: 20 BRPM | WEIGHT: 166 LBS | SYSTOLIC BLOOD PRESSURE: 134 MMHG | OXYGEN SATURATION: 97 % | HEART RATE: 78 BPM | HEIGHT: 71 IN | TEMPERATURE: 97 F

## 2023-07-31 DIAGNOSIS — G89.29 OTHER CHRONIC PAIN: ICD-10-CM

## 2023-07-31 DIAGNOSIS — M48.061 SPINAL STENOSIS OF LUMBAR REGION, UNSPECIFIED WHETHER NEUROGENIC CLAUDICATION PRESENT: ICD-10-CM

## 2023-07-31 DIAGNOSIS — R29.898 WEAKNESS OF BOTH LOWER EXTREMITIES: ICD-10-CM

## 2023-07-31 DIAGNOSIS — Z97.8 PRESENCE OF INTRATHECAL PUMP: Primary | ICD-10-CM

## 2023-07-31 DIAGNOSIS — M54.16 LUMBAR RADICULOPATHY: ICD-10-CM

## 2023-07-31 PROCEDURE — 1159F MED LIST DOCD IN RCRD: CPT

## 2023-07-31 PROCEDURE — 99213 OFFICE O/P EST LOW 20 MIN: CPT

## 2023-07-31 PROCEDURE — 1125F AMNT PAIN NOTED PAIN PRSNT: CPT

## 2023-07-31 PROCEDURE — 1160F RVW MEDS BY RX/DR IN RCRD: CPT

## 2023-07-31 RX ORDER — PREDNISONE 20 MG/1
1 TABLET ORAL DAILY
COMMUNITY
Start: 2023-06-20

## 2023-07-31 RX ORDER — FLURBIPROFEN SODIUM 0.3 MG/ML
1 SOLUTION/ DROPS OPHTHALMIC 3 TIMES DAILY
COMMUNITY
Start: 2023-06-30

## 2023-07-31 RX ORDER — METOPROLOL SUCCINATE 25 MG/1
TABLET, EXTENDED RELEASE ORAL
COMMUNITY

## 2023-10-31 ENCOUNTER — OFFICE VISIT (OUTPATIENT)
Dept: PAIN MEDICINE | Facility: CLINIC | Age: 78
End: 2023-10-31
Payer: MEDICARE

## 2023-10-31 VITALS
HEIGHT: 71 IN | SYSTOLIC BLOOD PRESSURE: 142 MMHG | BODY MASS INDEX: 24.27 KG/M2 | OXYGEN SATURATION: 97 % | TEMPERATURE: 96.9 F | WEIGHT: 173.4 LBS | DIASTOLIC BLOOD PRESSURE: 76 MMHG | HEART RATE: 77 BPM

## 2023-10-31 DIAGNOSIS — M54.16 LUMBAR RADICULOPATHY: ICD-10-CM

## 2023-10-31 DIAGNOSIS — M48.061 SPINAL STENOSIS OF LUMBAR REGION, UNSPECIFIED WHETHER NEUROGENIC CLAUDICATION PRESENT: ICD-10-CM

## 2023-10-31 DIAGNOSIS — Z97.8 PRESENCE OF INTRATHECAL PUMP: Primary | ICD-10-CM

## 2023-10-31 NOTE — PROGRESS NOTES
CHIEF COMPLAINT    Follow up back pain. The patient is here for pain pump refill.    Subjective   Saurav Cotto Sr. is a 78 y.o. male  who presents for follow-up.  He has a history of chronic back pain.  He presents today for IT pump refill.  Stable with current regimen.  IT Prialt is infusing at at total daily dose of 1.512 mcg/day (1.411mcg/day continuous rate with scheduled bolus of 0.1mcg dose over 30 minutes at 5:30am).  Pain is stable with regimen.  No adverse reactions reported.        Back Pain  This is a chronic problem. The current episode started more than 1 year ago. The problem occurs daily. The problem has been waxing and waning since onset. The pain is present in the lumbar spine. The quality of the pain is described as aching and burning. The pain is at a severity of 2/10. The pain is mild. Associated symptoms include weakness (from the waist down). Pertinent negatives include no abdominal pain, chest pain, dysuria, fever, headaches or numbness. Treatments tried: IT pump medications. The treatment provided moderate relief.        PEG Assessment   What number best describes your pain on average in the past week?4  What number best describes how, during the past week, pain has interfered with your enjoyment of life?4  What number best describes how, during the past week, pain has interfered with your general activity?  5    Review of Pertinent Medical Data ---    The following portions of the patient's history were reviewed and updated as appropriate: allergies, current medications, past family history, past medical history, past social history, past surgical history, and problem list.    Review of Systems   Constitutional:  Negative for chills and fever.   Respiratory:  Negative for cough and shortness of breath.    Cardiovascular:  Negative for chest pain.   Gastrointestinal:  Positive for constipation (gastroparesis) and diarrhea (gastroparesis). Negative for abdominal pain.   Genitourinary:   "Negative for difficulty urinating and dysuria.   Musculoskeletal:  Positive for back pain.   Neurological:  Positive for weakness (from the waist down). Negative for dizziness, light-headedness, numbness and headaches.     I have reviewed and confirmed the accuracy of the ROS as documented by the MA/LPN/RN ABDULKADIR Rowe    Vitals:    10/31/23 1357   BP: 142/76   BP Location: Left arm   Patient Position: Sitting   Pulse: 77   Temp: 96.9 °F (36.1 °C)   TempSrc: Temporal   SpO2: 97%   Weight: 78.7 kg (173 lb 6.4 oz)   Height: 180.3 cm (71\")   PainSc:   2   PainLoc: Back         Objective   Physical Exam  Vitals and nursing note reviewed.   Constitutional:       General: He is not in acute distress.     Appearance: Normal appearance. He is not ill-appearing.   Pulmonary:      Effort: Pulmonary effort is normal. No respiratory distress.   Musculoskeletal:      Lumbar back: Tenderness and bony tenderness present.   Neurological:      Mental Status: He is alert and oriented to person, place, and time.      Motor: Motor function is intact. No weakness.      Gait: Gait abnormal (slowed).   Psychiatric:         Mood and Affect: Mood normal.         Behavior: Behavior normal.     Intrathecal pump was interrogated in office today. Results are in chart.     The patient is currently at a dose of 1.411mcg/day continuous with the addition of 0.1mcg scheduled bolus at 5:30am.Total daily dose is 1.512mcg/day.       Under sterile technique, the pump was accessed using a [employer] proprietary refill kit. The volume was withdrawn from the pump. The expected residual volume of the pump was 14.1 ml. The actual residual volume of the pump was 14 ml.     The pump was then refilled with 20 ml of Prialt at a concentration of 25.0 mcg/ml.  The pump was set to continue at its previous rate. No changes were made.      Patient tolerated procedure well. Minimal bleeding was noted. Pump site remains intact with no evidence of erythema or " drainage.     Patient will return for pump refill when due or sooner if needed.      Estimated replacement March 2028.     Pump refill in 84 days      Assessment & Plan   Diagnoses and all orders for this visit:    1. Presence of intrathecal pump (Primary)    2. Spinal stenosis of lumbar region, unspecified whether neurogenic claudication present    3. Lumbar radiculopathy      --- No changes made to IT regimen. Pump refilled as documented above    Saurav Cotto Sr. reports a pain score of 2.  Given his pain assessment as noted, treatment options were discussed and the following options were decided upon as a follow-up plan to address the patient's pain: continuation of current treatment plan for pain.      --- Follow-up 84 days for IT pump refill

## 2023-11-15 ENCOUNTER — OFFICE VISIT (OUTPATIENT)
Dept: GASTROENTEROLOGY | Facility: CLINIC | Age: 78
End: 2023-11-15
Payer: MEDICARE

## 2023-11-15 VITALS
BODY MASS INDEX: 24.22 KG/M2 | WEIGHT: 173 LBS | SYSTOLIC BLOOD PRESSURE: 148 MMHG | DIASTOLIC BLOOD PRESSURE: 80 MMHG | HEIGHT: 71 IN

## 2023-11-15 DIAGNOSIS — K31.84 GASTROPARESIS: Primary | ICD-10-CM

## 2023-11-15 DIAGNOSIS — K63.8219 SMALL INTESTINAL BACTERIAL OVERGROWTH (SIBO): ICD-10-CM

## 2023-11-15 DIAGNOSIS — K59.03 DRUG-INDUCED CONSTIPATION: ICD-10-CM

## 2023-11-15 DIAGNOSIS — Z97.8 PRESENCE OF INTRATHECAL PUMP: ICD-10-CM

## 2023-11-15 PROCEDURE — 99204 OFFICE O/P NEW MOD 45 MIN: CPT | Performed by: INTERNAL MEDICINE

## 2023-11-15 PROCEDURE — 1159F MED LIST DOCD IN RCRD: CPT | Performed by: INTERNAL MEDICINE

## 2023-11-15 PROCEDURE — 1160F RVW MEDS BY RX/DR IN RCRD: CPT | Performed by: INTERNAL MEDICINE

## 2023-11-15 RX ORDER — BLOOD SUGAR DIAGNOSTIC
STRIP MISCELLANEOUS 3 TIMES DAILY
COMMUNITY
Start: 2023-10-04

## 2023-11-15 RX ORDER — TESTOSTERONE CYPIONATE 200 MG/ML
100 INJECTION, SOLUTION INTRAMUSCULAR WEEKLY
COMMUNITY
Start: 2023-09-14

## 2023-11-15 RX ORDER — CABERGOLINE 0.5 MG/1
0.5 TABLET ORAL 2 TIMES WEEKLY
COMMUNITY

## 2023-11-15 RX ORDER — FERROUS SULFATE 325(65) MG
325 TABLET ORAL
COMMUNITY
Start: 2023-11-10

## 2023-11-15 NOTE — PROGRESS NOTES
"    PATIENT INFORMATION  Saurav Cotto Sr.       - 1945    CHIEF COMPLAINT  Chief Complaint   Patient presents with    Gastroparesis       HISTORY OF PRESENT ILLNESS  Her due to location and was seeing Dr Hawkins and has extensive records and reviewed what is available and his GES is not here but his wife has it at homeand was performed at [facility].    Has 3 days with out effctive BMs andf then bhavin have a clean out with several large BM conor initially clog the toulet but then will be diarrhea.    Dedspite the pain pump he is on MS contin daily ( 60mg) as well and is on Movantik for the last 3 years.     His gastroparesis was nausea dn dry heaves and is better on Domperidone and that improved  significantly felt that lead to 80 pound weight loss      Describes foul gas w/o egg burps                 REVIEWED PERTINENT RESULTS/ LABS  No results found for: \"CASEREPORT\", \"FINALDX\"  Lab Results   Component Value Date    HGB 13.8 2022    MCV 94.2 2022     2022    ALT 15 2020    AST 19 2020    HGBA1C 6.2 (H) 2022    TRIG 78 2020      No results found.    REVIEW OF SYSTEMS  Review of Systems   Constitutional:  Negative for activity change, chills, fever and unexpected weight change.   HENT:  Positive for trouble swallowing. Negative for congestion.    Eyes:  Negative for visual disturbance.   Respiratory:  Positive for choking. Negative for shortness of breath.    Cardiovascular:  Negative for chest pain and palpitations.   Gastrointestinal:  Positive for abdominal distention, abdominal pain, anal bleeding, constipation, diarrhea and nausea. Negative for blood in stool.   Endocrine: Negative for cold intolerance and heat intolerance.   Genitourinary:  Negative for hematuria.   Musculoskeletal:  Negative for gait problem.   Skin:  Negative for color change.   Allergic/Immunologic: Negative for immunocompromised state.   Neurological:  Negative for weakness and " light-headedness.   Hematological:  Negative for adenopathy.   Psychiatric/Behavioral:  Negative for sleep disturbance. The patient is not nervous/anxious.          ACTIVE PROBLEMS  Patient Active Problem List    Diagnosis     MCI (mild cognitive impairment) with memory loss [G31.84]     History of TIA (transient ischemic attack) [Z86.73]     Idiopathic stabbing headache [G44.85]     Presence of intrathecal pump [Z97.8]     S/P insertion of intrathecal pump [Z98.890]     Other chronic pain [G89.29]     Lumbar radiculopathy [M54.16]     Spinal stenosis of lumbar region [M48.061]          PAST MEDICAL HISTORY  Past Medical History:   Diagnosis Date    Acid reflux     Anxiety     Asthma     Chronic back pain     Depression     Hypertension     Joint pain     Low back pain     MCI (mild cognitive impairment) with memory loss     Myocardial infarct 1991, 1994, 1995, 2002, 2005,    Neuropathy in diabetes     Osteoarthritis     Peripheral neuropathy     Sleep apnea     CPAP    Stroke (cerebrum) 11/2022    Vitamin B 12 deficiency          SURGICAL HISTORY  Past Surgical History:   Procedure Laterality Date    APPENDECTOMY      CARDIAC CATHETERIZATION      MULTIPLE    CARDIAC SURGERY  1995    CLOT REMOVED    CHEST SURGERY  11/1995    REPAIR CHEST BONE SURGERY    CORONARY ARTERY BYPASS GRAFT      5 VESSELS    GALLBLADDER SURGERY      HEART TRANSPLANT      HEART TRANSPLANT  06/17/2008    HEART TRANSPLANT  2008    KNEE ARTHROPLASTY Left 11/2006    KNEE ARTHROSCOPY Left 1994    KNEE CARTILAGE SURGERY Right 10/17/2017    KNEE SURGERY Bilateral     PAIN PUMP INSERTION/REVISION N/A 07/16/2021    Procedure: PAIN PUMP INSERTION, SPINAL CORD STIMULATOR REMOVAL PRIALT +++ LOW DOSE++;  Surgeon: Stevo Brian MD;  [place] [facility] [place];  Service: Pain Management;  Laterality: N/A;    PAIN PUMP TRIAL INJECTION ONLY N/A 06/18/2021    Procedure: Injection Single for Pain Pump Trial;  Surgeon: Stevo Brian MD;  Location: [facility] [place]  MAIN [place];  Service: Pain Management;  Laterality: N/A;    PROSTATE SURGERY      REPAIR    SHOULDER SURGERY Right     SPINAL CORD STIMULATOR IMPLANT N/A 04/20/2018    Procedure: SPINAL CORD STIMULATOR INSERTION PHASE 2;  Surgeon: Stevo Brian MD;  Location: [facility] MAIN OR;  Service: Pain Management    TOTAL KNEE ARTHROPLASTY REVISION Left 10/30/2009    TOTAL KNEE ARTHROPLASTY REVISION Right     TOTAL SHOULDER REPLACEMENT Right 08/02/2023    revision         FAMILY HISTORY  Family History   Problem Relation Age of Onset    Stroke Mother     Dementia Mother     Arthritis Mother     Cancer Mother     Diabetes Mother     Hypertension Mother     Heart disease Mother     Hyperlipidemia Mother     Thyroid disease Mother     Heart disease Father     Hyperlipidemia Father     Hypertension Father     Arthritis Father          SOCIAL HISTORY  Social History     Occupational History    Not on file   Tobacco Use    Smoking status: Former    Smokeless tobacco: Never   Vaping Use    Vaping Use: Never used   Substance and Sexual Activity    Alcohol use: No    Drug use: No    Sexual activity: Defer         CURRENT MEDICATIONS    Current Outpatient Medications:     Accu-Chek Guide test strip, 3 (Three) Times a Day. as directed, Disp: , Rfl:     albuterol (PROVENTIL HFA;VENTOLIN HFA) 108 (90 Base) MCG/ACT inhaler, Inhale 2 puffs Every 4 (Four) Hours As Needed for Wheezing., Disp: , Rfl:     amLODIPine (NORVASC) 10 MG tablet, , Disp: , Rfl:     aspirin 81 MG EC tablet, Take 1 tablet by mouth. HOLD PRIOR TO SURGERY, Disp: , Rfl:     atorvastatin (LIPITOR) 10 MG tablet, , Disp: , Rfl:     B-D UF III MINI PEN NEEDLES 31G X 5 MM misc, 1 each by Other route 3 (Three) Times a Day., Disp: , Rfl:     busPIRone (BUSPAR) 15 MG tablet, 10 mg., Disp: , Rfl:     cabergoline (DOSTINEX) 0.5 MG tablet, Take 1 tablet by mouth 2 (Two) Times a Week., Disp: , Rfl:     chlorthalidone (HYGROTON) 25 MG tablet, Take 1 tablet by mouth Every Morning., Disp:  , Rfl:     Cholecalciferol (VITAMIN D3) 2000 units tablet, Take 1 tablet by mouth Every Evening. HOLD PRIOR TO SURGERY, Disp: , Rfl:     cloNIDine (CATAPRES-TTS) 0.3 MG/24HR patch, Place 1 patch on the skin as directed by provider 1 (One) Time Per Week., Disp: , Rfl:     clopidogrel (PLAVIX) 75 MG tablet, Take 1 tablet by mouth Daily., Disp: 30 tablet, Rfl: 3    cyanocobalamin 1000 MCG/ML injection, Inject 1 mL into the appropriate muscle as directed by prescriber Every 28 (Twenty-Eight) Days., Disp: , Rfl:     DULoxetine (CYMBALTA) 60 MG capsule, Take 1 capsule by mouth., Disp: , Rfl:     FeroSul 325 (65 Fe) MG tablet, Take 1 tablet by mouth Daily With Breakfast., Disp: , Rfl:     hydrALAZINE (APRESOLINE) 100 MG tablet, Take 1 tablet by mouth 3 (Three) Times a Day., Disp: , Rfl:     insulin aspart (novoLOG) 100 UNIT/ML injection, SLIDING SCALE, Disp: , Rfl:     Insulin Disposable Pump (V-GO 30) kit, Inject 1 Device under the skin., Disp: , Rfl:     Magnesium Oxide (MAG-OXIDE PO), Take 1,200 mg by mouth., Disp: , Rfl:     memantine (NAMENDA XR) 28 MG capsule sustained-release 24 hr extended release capsule, Take 1 capsule by mouth Daily., Disp: 90 capsule, Rfl: 3    metoprolol succinate XL (TOPROL-XL) 25 MG 24 hr tablet, TAKE 1/2 (ONE-HALF) TABLET BY MOUTH EVERY DAY AT BEDTIME, Disp: , Rfl:     Morphine (MS CONTIN) 60 MG 12 hr tablet, Take 1 tablet by mouth 2 (Two) Times a Day. Taking 60 mg in the Am and 30 mg in the evening., Disp: , Rfl:     Multiple Vitamin (MULTIVITAMIN) tablet, Take 1 tablet by mouth Daily. HOLD PRIOR TO SURGERY, Disp: , Rfl:     mycophenolate (CELLCEPT) 250 MG capsule, Take 3 capsules by mouth., Disp: , Rfl:     Naloxegol Oxalate (MOVANTIK) 25 MG tablet, Take 1 tablet by mouth Daily As Needed., Disp: , Rfl:     omeprazole (priLOSEC) 40 MG capsule, Take 1 capsule by mouth Every Morning., Disp: , Rfl:     ondansetron (ZOFRAN) 8 MG tablet, As Needed., Disp: , Rfl:     rivastigmine (EXELON) 6 MG  "capsule, Take 1 capsule by mouth 2 (Two) Times a Day., Disp: 180 capsule, Rfl: 3    sennosides-docusate (PERICOLACE) 8.6-50 MG per tablet, Take 2 tablets by mouth Daily., Disp: , Rfl:     spironolactone (ALDACTONE) 25 MG tablet, Take 1 tablet by mouth Daily., Disp: , Rfl:     tacrolimus (PROGRAF) 1 MG capsule, Take 1 capsule by mouth., Disp: , Rfl:     tamsulosin (FLOMAX) 0.4 MG capsule 24 hr capsule, Take 1 capsule by mouth Daily. TAKES @ 1200 NOON, Disp: , Rfl:     Testosterone Cypionate (DEPOTESTOTERONE CYPIONATE) 200 MG/ML injection, Inject 0.5 mL into the appropriate muscle as directed by prescriber 1 (One) Time Per Week., Disp: , Rfl:     Unable to find, Take 1 each by mouth 3 (Three) Times a Day. Med Name: domeperidone 10 mg, Disp: , Rfl:     ziconotide (PF) 25 mcg/mL, by Intrathecal route Continuous., Disp: 20 mL, Rfl: 0    ciprofloxacin (Cipro) 500 MG tablet, Take 1 tablet by mouth 2 (Two) Times a Day for 14 days., Disp: 28 tablet, Rfl: 0    metroNIDAZOLE (FLAGYL) 250 MG tablet, Take 1 tablet by mouth 3 (Three) Times a Day for 14 days., Disp: 42 tablet, Rfl: 0    riFAXIMin (XIFAXAN) 550 MG tablet, Take 1 tablet by mouth Every 8 (Eight) Hours., Disp: 42 tablet, Rfl: 3    ALLERGIES  Coreg [carvedilol], Singulair [montelukast sodium], and Theophyllines    VITALS  Vitals:    11/15/23 1106   BP: 148/80   BP Location: Left arm   Patient Position: Sitting   Cuff Size: Adult   Weight: 78.5 kg (173 lb)   Height: 180.3 cm (71\")       PHYSICAL EXAM  Debilities/Disabilities Identified: None  Emotional Behavior: Appropriate  Wt Readings from Last 3 Encounters:   11/15/23 78.5 kg (173 lb)   10/31/23 78.7 kg (173 lb 6.4 oz)   07/31/23 75.3 kg (166 lb)     Ht Readings from Last 1 Encounters:   11/15/23 180.3 cm (71\")     Body mass index is 24.13 kg/m².  Physical Exam  Constitutional:       Appearance: He is well-developed. He is not diaphoretic.   HENT:      Head: Normocephalic and atraumatic.   Eyes:      General: No " scleral icterus.     Conjunctiva/sclera: Conjunctivae normal.      Pupils: Pupils are equal, round, and reactive to light.   Neck:      Thyroid: No thyromegaly.   Cardiovascular:      Rate and Rhythm: Normal rate and regular rhythm.      Heart sounds: Normal heart sounds. No murmur heard.     No gallop.   Pulmonary:      Effort: Pulmonary effort is normal.      Breath sounds: Normal breath sounds. No wheezing or rales.   Abdominal:      General: Bowel sounds are normal. There is no distension or abdominal bruit.      Palpations: Abdomen is soft. There is no shifting dullness, fluid wave or mass.      Tenderness: There is abdominal tenderness in the left lower quadrant. There is no guarding. Negative signs include Pacheco's sign.      Hernia: There is no hernia in the ventral area.   Musculoskeletal:         General: Normal range of motion.      Cervical back: Normal range of motion and neck supple.   Lymphadenopathy:      Cervical: No cervical adenopathy.   Skin:     General: Skin is warm and dry.      Findings: No erythema or rash.   Neurological:      Mental Status: He is alert and oriented to person, place, and time.   Psychiatric:         Mood and Affect: Mood normal.         Behavior: Behavior normal.         CLINICAL DATA REVIEWED   reviewed previous lab results and integrated with today's visit, reviewed notes from other physicians and/or last GI encounter, reviewed previous endoscopy results and available photos, reviewed surgical pathology results from previous biopsies    ASSESSMENT  Diagnoses and all orders for this visit:    Gastroparesis    Small intestinal bacterial overgrowth (SIBO)    Drug-induced constipation    Presence of intrathecal pump    Other orders  -     FeroSul 325 (65 Fe) MG tablet; Take 1 tablet by mouth Daily With Breakfast.  -     cabergoline (DOSTINEX) 0.5 MG tablet; Take 1 tablet by mouth 2 (Two) Times a Week.  -     Accu-Chek Guide test strip; 3 (Three) Times a Day. as directed  -      Testosterone Cypionate (DEPOTESTOTERONE CYPIONATE) 200 MG/ML injection; Inject 0.5 mL into the appropriate muscle as directed by prescriber 1 (One) Time Per Week.  -     riFAXIMin (XIFAXAN) 550 MG tablet; Take 1 tablet by mouth Every 8 (Eight) Hours.          PLAN  Will terat for what appears to be SIBO and continue Movantik   Diet for Gastroparesis was reviewed in detail and continue Domperidone especially prior to solid food meals- but increase use of Supplements/Smoothies  Soluble fiber was suggested as well     Return in about 3 months (around 2/15/2024).    I have discussed the above plan with the patient.  They verbalize understanding and are in agreement with the plan.  They have been advised to contact the office for any questions, concerns, or changes related to their health.

## 2023-11-16 ENCOUNTER — TELEPHONE (OUTPATIENT)
Dept: GASTROENTEROLOGY | Facility: CLINIC | Age: 78
End: 2023-11-16
Payer: MEDICARE

## 2023-11-16 ENCOUNTER — PATIENT ROUNDING (BHMG ONLY) (OUTPATIENT)
Dept: GASTROENTEROLOGY | Facility: CLINIC | Age: 78
End: 2023-11-16
Payer: MEDICARE

## 2023-11-16 RX ORDER — METRONIDAZOLE 250 MG/1
250 TABLET ORAL 3 TIMES DAILY
Qty: 42 TABLET | Refills: 0 | Status: SHIPPED | OUTPATIENT
Start: 2023-11-16 | End: 2023-11-30

## 2023-11-16 RX ORDER — CIPROFLOXACIN 500 MG/1
500 TABLET, FILM COATED ORAL 2 TIMES DAILY
Qty: 28 TABLET | Refills: 0 | Status: SHIPPED | OUTPATIENT
Start: 2023-11-16 | End: 2023-11-30

## 2023-11-16 NOTE — TELEPHONE ENCOUNTER
Pt advised to absolutely finish entire course of both antibiotics, even if he feels better mid course.  Also advised to  OTC probiotic @ pharmacy to take.  Pt voiced understanding.

## 2023-11-16 NOTE — TELEPHONE ENCOUNTER
PA Case: [id number], Status: Approved, Coverage Starts on: 1/1/2023 12:00:00 AM, Coverage Ends on: 12/31/2024 12:00:00 AM. Questions? Contact [phone].

## 2024-01-23 ENCOUNTER — OFFICE VISIT (OUTPATIENT)
Dept: PAIN MEDICINE | Facility: CLINIC | Age: 79
End: 2024-01-23
Payer: MEDICARE

## 2024-01-23 VITALS
OXYGEN SATURATION: 97 % | HEIGHT: 71 IN | TEMPERATURE: 97.3 F | SYSTOLIC BLOOD PRESSURE: 132 MMHG | BODY MASS INDEX: 23.24 KG/M2 | RESPIRATION RATE: 18 BRPM | DIASTOLIC BLOOD PRESSURE: 70 MMHG | HEART RATE: 79 BPM | WEIGHT: 166 LBS

## 2024-01-23 DIAGNOSIS — M54.16 LUMBAR RADICULOPATHY: ICD-10-CM

## 2024-01-23 DIAGNOSIS — Z97.8 PRESENCE OF INTRATHECAL PUMP: ICD-10-CM

## 2024-01-23 DIAGNOSIS — M48.061 SPINAL STENOSIS OF LUMBAR REGION, UNSPECIFIED WHETHER NEUROGENIC CLAUDICATION PRESENT: ICD-10-CM

## 2024-01-23 DIAGNOSIS — G89.29 OTHER CHRONIC PAIN: Primary | ICD-10-CM

## 2024-01-23 PROCEDURE — 1159F MED LIST DOCD IN RCRD: CPT | Performed by: NURSE PRACTITIONER

## 2024-01-23 PROCEDURE — 1125F AMNT PAIN NOTED PAIN PRSNT: CPT | Performed by: NURSE PRACTITIONER

## 2024-01-23 PROCEDURE — 1160F RVW MEDS BY RX/DR IN RCRD: CPT | Performed by: NURSE PRACTITIONER

## 2024-01-23 PROCEDURE — 62369 ANAL SP INF PMP W/REPRG&FILL: CPT | Performed by: NURSE PRACTITIONER

## 2024-01-23 NOTE — PROGRESS NOTES
CHIEF COMPLAINT  Back pain/pump refill    Subjective   Saurav Cotto Sr. is a 78 y.o. male  who presents for follow-up.  He has a history of chronic back pain.   He presents today for IT pump refill.  Stable with current regimen.  IT Prialt is infusing at at total daily dose of 1.512 mcg/day (1.411mcg/day continuous rate with scheduled bolus of 0.1mcg dose over 30 minutes at 5:30am).  Pain is stable with regimen.  No adverse reactions reported.   Continues to report most severe pain is first thing in the morning.      Prescribed oral Morphine by PCP. Has been unable to successfully wean.      Back Pain  This is a chronic problem. The current episode started more than 1 year ago. The problem occurs daily. The problem has been waxing and waning since onset. The pain is present in the lumbar spine. The quality of the pain is described as aching and burning. The pain is at a severity of 2/10. The pain is mild. Associated symptoms include weakness. Pertinent negatives include no abdominal pain, chest pain, dysuria, fever, headaches or numbness. Treatments tried: IT pump medications. The treatment provided moderate relief.      PEG Assessment   What number best describes your pain on average in the past week?5  What number best describes how, during the past week, pain has interfered with your enjoyment of life?5  What number best describes how, during the past week, pain has interfered with your general activity?  5    Review of Pertinent Medical Data ---    The following portions of the patient's history were reviewed and updated as appropriate: allergies, current medications, past family history, past medical history, past social history, past surgical history, and problem list.    Review of Systems   Constitutional:  Negative for activity change, fatigue and fever.   HENT:  Negative for congestion.    Respiratory:  Negative for cough and chest tightness.    Cardiovascular:  Negative for chest pain.  "  Gastrointestinal:  Positive for constipation. Negative for abdominal pain and diarrhea.   Genitourinary:  Negative for difficulty urinating and dysuria.   Musculoskeletal:  Positive for back pain.   Neurological:  Positive for weakness. Negative for dizziness, light-headedness, numbness and headaches.   Psychiatric/Behavioral:  Negative for agitation, sleep disturbance and suicidal ideas. The patient is not nervous/anxious.        I have reviewed and confirmed the accuracy of the ROS as documented by the MA/LPN/RN ABDULKADIR Rowe    Vitals:    01/23/24 1405   BP: 132/70   BP Location: Left arm   Patient Position: Sitting   Cuff Size: Large Adult   Pulse: 79   Resp: 18   Temp: 97.3 °F (36.3 °C)   TempSrc: Temporal   SpO2: 97%   Weight: 75.3 kg (166 lb)   Height: 180.3 cm (71\")   PainSc:   2     Objective   Physical Exam  Vitals and nursing note reviewed.   Constitutional:       General: He is not in acute distress.     Appearance: Normal appearance. He is not ill-appearing.   Pulmonary:      Effort: Pulmonary effort is normal. No respiratory distress.   Musculoskeletal:      Lumbar back: Tenderness and bony tenderness present. Negative right straight leg raise test and negative left straight leg raise test.   Neurological:      Mental Status: He is alert and oriented to person, place, and time.      Motor: Motor function is intact. No weakness.      Gait: Gait abnormal (slowed).   Psychiatric:         Mood and Affect: Mood normal.         Behavior: Behavior normal.         Intrathecal pump was interrogated in office today. Results are in chart.     The patient is currently at a dose of 1.411mcg/day continuous with the addition of 0.1mcg scheduled bolus at 5:30am.Total daily dose is 1.512mcg/day.       Under sterile technique, the pump was accessed using a [employer] proprietary refill kit. The volume was withdrawn from the pump. The expected residual volume of the pump was 14.9 ml. The actual residual " volume of the pump was 16 ml.     The pump was then refilled with 20 ml of Prialt at a concentration of 25.0 mcg/ml.  The pump was set to continue at its previous rate with an adjustment to the scheduled bolus from 0.1mcg/day to 0.2mcg/day.      Patient tolerated procedure well. Minimal bleeding was noted. Pump site remains intact with no evidence of erythema or drainage.     Patient will return for pump refill when due or sooner if needed.      Estimated battery replacement March 2028.     Pump refill in 84 days    Assessment & Plan   Diagnoses and all orders for this visit:    1. Other chronic pain (Primary)    2. Presence of intrathecal pump    3. Spinal stenosis of lumbar region, unspecified whether neurogenic claudication present    4. Lumbar radiculopathy      --- Refill IT pump today --- Prialt.  --- Increase 05:30 AM bolus to 0.2mcg, no other changes made to regimen     Saurav Cotto Sr. reports a pain score of 2.  Given his pain assessment as noted, treatment options were discussed and the following options were decided upon as a follow-up plan to address the patient's pain:  see plan above .      --- Follow-up 84 days for IT pump refill                 Dictated utilizing Dragon dictation.

## 2024-01-31 RX ORDER — CLOPIDOGREL BISULFATE 75 MG/1
75 TABLET ORAL DAILY
Qty: 30 TABLET | Refills: 0 | Status: SHIPPED | OUTPATIENT
Start: 2024-01-31

## 2024-01-31 NOTE — TELEPHONE ENCOUNTER
Caller: Catia Fernandez    Relationship: Emergency Contact    Best call back number: [phone]    Requested Prescriptions:   Requested Prescriptions     Pending Prescriptions Disp Refills    clopidogrel (PLAVIX) 75 MG tablet 30 tablet 3     Sig: Take 1 tablet by mouth Daily.        Pharmacy where request should be sent: [facility] [address] [place] [address] [facility] [phone] [facility] [phone] FX     Last office visit with prescribing clinician: 2/21/2023   Last telemedicine visit with prescribing clinician: Visit date not found   Next office visit with prescribing clinician: 2/20/2024     Additional details provided by patient: ORIGINAL PRESCRIBER IS NO LONGER W/ PRACTICE & SHE IS REQUESTING RX FROM DR. ZABALA    PLEASE SEND RX OR CALL TO ADVISE-THANK YOU     Does the patient have less than a 3 day supply:  [] Yes  [x] No    Would you like a call back once the refill request has been completed: [] Yes [x] No    If the office needs to give you a call back, can they leave a voicemail: [] Yes [x] No    Jan Eldridge Rep   01/31/24 14:28 EST

## 2024-02-13 ENCOUNTER — TELEPHONE (OUTPATIENT)
Dept: PAIN MEDICINE | Facility: CLINIC | Age: 79
End: 2024-02-13

## 2024-02-13 ENCOUNTER — OFFICE VISIT (OUTPATIENT)
Dept: PAIN MEDICINE | Facility: CLINIC | Age: 79
End: 2024-02-13
Payer: MEDICARE

## 2024-02-13 VITALS
DIASTOLIC BLOOD PRESSURE: 76 MMHG | BODY MASS INDEX: 23.49 KG/M2 | TEMPERATURE: 97.1 F | SYSTOLIC BLOOD PRESSURE: 122 MMHG | RESPIRATION RATE: 18 BRPM | OXYGEN SATURATION: 94 % | HEART RATE: 57 BPM | HEIGHT: 71 IN | WEIGHT: 167.8 LBS

## 2024-02-13 DIAGNOSIS — M48.061 SPINAL STENOSIS OF LUMBAR REGION, UNSPECIFIED WHETHER NEUROGENIC CLAUDICATION PRESENT: ICD-10-CM

## 2024-02-13 DIAGNOSIS — Z97.8 PRESENCE OF INTRATHECAL PUMP: ICD-10-CM

## 2024-02-13 DIAGNOSIS — G89.29 OTHER CHRONIC PAIN: Primary | ICD-10-CM

## 2024-02-13 RX ORDER — INSULIN GLARGINE 100 [IU]/ML
INJECTION, SOLUTION SUBCUTANEOUS
COMMUNITY
Start: 2024-01-22

## 2024-02-20 ENCOUNTER — OFFICE VISIT (OUTPATIENT)
Dept: NEUROLOGY | Facility: CLINIC | Age: 79
End: 2024-02-20
Payer: MEDICARE

## 2024-02-20 VITALS
WEIGHT: 164.2 LBS | BODY MASS INDEX: 22.99 KG/M2 | HEIGHT: 71 IN | HEART RATE: 74 BPM | OXYGEN SATURATION: 95 % | SYSTOLIC BLOOD PRESSURE: 120 MMHG | DIASTOLIC BLOOD PRESSURE: 78 MMHG

## 2024-02-20 DIAGNOSIS — G31.84 MCI (MILD COGNITIVE IMPAIRMENT) WITH MEMORY LOSS: Primary | ICD-10-CM

## 2024-02-20 DIAGNOSIS — Z86.73 HISTORY OF TIA (TRANSIENT ISCHEMIC ATTACK): ICD-10-CM

## 2024-02-20 PROCEDURE — 1159F MED LIST DOCD IN RCRD: CPT | Performed by: PSYCHIATRY & NEUROLOGY

## 2024-02-20 PROCEDURE — 1160F RVW MEDS BY RX/DR IN RCRD: CPT | Performed by: PSYCHIATRY & NEUROLOGY

## 2024-02-20 PROCEDURE — 99213 OFFICE O/P EST LOW 20 MIN: CPT | Performed by: PSYCHIATRY & NEUROLOGY

## 2024-02-20 NOTE — PROGRESS NOTES
Notes by MA:  Pt is here today for a follow up visit. He feels under th weather today. His wife is here with him.        Subjective:     Patient ID: Saurav Cotto Sr. is a 79 y.o. male.    History of Present Illness  The following portions of the patient's history were reviewed and updated as appropriate: allergies, current medications, past family history, past medical history, past social history, past surgical history, and problem list.    Review of Systems   Musculoskeletal:  Positive for gait problem (using cane).   Neurological:  Positive for weakness. Negative for dizziness, tremors, seizures, syncope, facial asymmetry, speech difficulty, light-headedness, numbness and headaches.   Psychiatric/Behavioral:  Negative for agitation, behavioral problems, confusion, decreased concentration, dysphoric mood, hallucinations, self-injury, sleep disturbance and suicidal ideas. The patient is not nervous/anxious and is not hyperactive.         Objective:    Neurologic Exam  Awake alert pleasant cooperative with fluent speech and normal speech comprehension. Multiple cognitive screens were performed. He once again scored normally as noted in the associated cognitive screens (28 on the Folstein today). Cranial nerves II through XII normal and symmetric. The motor exam continues to reveal symmetric tone bulk and power appropriate for age and activity level. The sensory exam reveals reduced decreased sensation to temperature bilaterally. He continues to demonstrate a positive Romberg and a wide-based gait. Tendon reflexes are diffusely absent.   Physical Exam    Assessment/Plan:     Diagnoses and all orders for this visit:    1. MCI (mild cognitive impairment) with memory loss (Primary)    2. History of TIA (transient ischemic attack)     78-year-old gentleman caring the diagnosis of mild cognitive impairment after evaluation at [facility] where he was placed on the combination of Exelon and extended release  Smiley. His cognitive screens normalized after that and have remained stable including today's examination. Tolerating the medications well. I reassured him that he is doing well and no changes are clearly indicated today.  Continues on dual antiplatelet therapy given his history of TIA and cardiac issues.  Again no changes.  Happy to see him back as needed.

## 2024-02-23 RX ORDER — CLOPIDOGREL BISULFATE 75 MG/1
75 TABLET ORAL DAILY
Qty: 30 TABLET | Refills: 5 | Status: SHIPPED | OUTPATIENT
Start: 2024-02-23

## 2024-03-04 DIAGNOSIS — G31.84 MCI (MILD COGNITIVE IMPAIRMENT) WITH MEMORY LOSS: Primary | ICD-10-CM

## 2024-03-04 RX ORDER — RIVASTIGMINE TARTRATE 6 MG/1
6 CAPSULE ORAL 2 TIMES DAILY
Qty: 180 CAPSULE | Refills: 3 | Status: SHIPPED | OUTPATIENT
Start: 2024-03-04

## 2024-03-11 ENCOUNTER — TELEPHONE (OUTPATIENT)
Dept: GASTROENTEROLOGY | Facility: CLINIC | Age: 79
End: 2024-03-11
Payer: MEDICARE

## 2024-03-11 NOTE — TELEPHONE ENCOUNTER
PT WIFE CALLED SHE WANTS SOMEONE TO CALL HER BACK    SHE IS REQUESTING DR DE LA O CALL IN AN ORDER OF DOMPERIDONE  10 MG TO HER [facility]    SHE SAID IT TAKES 2 WEEKS TO GET THE MEDS AND PT ONLY HAS ONE WEEK LEFT. SO SHE IS REQUESTING HE SEND THE RX OFF TODAY.    SHE SAID TO LET DR DE LA O KNOW HE TAKES 5 OF THOSE PILLS A DAY.    SHE WOULD LIKE SOMEONE TO CALL HER BACK ABOUT HIM CALLING IN THE ORDER    ALSO, SHE WANTED TO REQUEST DR DE LA O EMAIL HER A COPY OF THE RX TO HER EMAIL AT [email or web address]    HER CALL BACK # [phone]    PHARM IN [place]    [phone]  [phone]    SHE SAID THE PREVIOUS RX # WAS [id number]

## 2024-04-05 ENCOUNTER — OFFICE VISIT (OUTPATIENT)
Dept: GASTROENTEROLOGY | Facility: CLINIC | Age: 79
End: 2024-04-05
Payer: MEDICARE

## 2024-04-05 ENCOUNTER — TELEPHONE (OUTPATIENT)
Dept: GASTROENTEROLOGY | Facility: CLINIC | Age: 79
End: 2024-04-05

## 2024-04-05 VITALS
BODY MASS INDEX: 22.93 KG/M2 | WEIGHT: 163.8 LBS | SYSTOLIC BLOOD PRESSURE: 134 MMHG | HEIGHT: 71 IN | DIASTOLIC BLOOD PRESSURE: 84 MMHG

## 2024-04-05 DIAGNOSIS — K31.84 GASTROPARESIS: ICD-10-CM

## 2024-04-05 DIAGNOSIS — K59.03 THERAPEUTIC OPIOID-INDUCED CONSTIPATION (OIC): ICD-10-CM

## 2024-04-05 DIAGNOSIS — Z86.010 PERSONAL HISTORY OF COLONIC POLYPS: Primary | ICD-10-CM

## 2024-04-05 DIAGNOSIS — T40.2X5A THERAPEUTIC OPIOID-INDUCED CONSTIPATION (OIC): ICD-10-CM

## 2024-04-05 PROBLEM — Z86.0100 PERSONAL HISTORY OF COLONIC POLYPS: Status: ACTIVE | Noted: 2024-04-05

## 2024-04-05 NOTE — PROGRESS NOTES
"    PATIENT INFORMATION  Saurav Cotto Sr.       - 1945    CHIEF COMPLAINT  Chief Complaint   Patient presents with    Gastroparesis     SIBO    Constipation       HISTORY OF PRESENT ILLNESS    Here today for GP and OIC follow-up    Gastroparesis: Zofran every morning helps keeps things regulated and sometimes takes a 2nd dose. Domperidone has helped a lot, 3 times. Vomited maybe once since LOV. Omeprazole daily. Diet is ok, 3-4 meals a day, shakes in between.    IBS-C: Miralax 1-2 days a week, with BM most days, then can skip 2 days and then can have some diarrhea. Foul gas at LOV and improved now, but did not notice a difference immediately after xifaxan. Out of probiotics right now. Off movantik now because was having diarrhea, but doing better off and on miralax. Cramping every once in while, maybe when skipping a few days, but not often. Taking docusate every day also. Pain pump and Ms Contin.    Last Colon [facility], not sure when, >5 yrs did have polyps.    Heart transplant .    Constipation  Associated symptoms include nausea. Pertinent negatives include no abdominal pain, diarrhea or vomiting.       REVIEWED PERTINENT RESULTS/ LABS  No results found for: \"CASEREPORT\", \"FINALDX\"  Lab Results   Component Value Date    HGB 12.2 (L) 2023    MCV 89.6 2023     2023    ALT 12 2022    AST 12 2022    HGBA1C 6.7 (H) 2023    TRIG 78 2020      No results found.    REVIEW OF SYSTEMS  Review of Systems   Eyes: Negative.    Respiratory: Negative.     Cardiovascular: Negative.    Gastrointestinal:  Positive for constipation and nausea. Negative for abdominal pain, diarrhea and vomiting.        SIBO, Gastroparesis   Endocrine: Negative.    Genitourinary: Negative.    Musculoskeletal: Negative.    Skin: Negative.    Allergic/Immunologic: Negative.    Neurological:  Positive for weakness and headaches.   Hematological:  Bruises/bleeds easily. "   Psychiatric/Behavioral: Negative.           ACTIVE PROBLEMS  Patient Active Problem List    Diagnosis     MCI (mild cognitive impairment) with memory loss [G31.84]     History of TIA (transient ischemic attack) [Z86.73]     Idiopathic stabbing headache [G44.85]     Presence of intrathecal pump [Z97.8]     S/P insertion of intrathecal pump [Z98.890]     Other chronic pain [G89.29]     Lumbar radiculopathy [M54.16]     Spinal stenosis of lumbar region [M48.061]          PAST MEDICAL HISTORY  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    Vitamin B 12 deficiency          SURGICAL HISTORY  Past Surgical History:   Procedure Laterality Date    APPENDECTOMY      CARDIAC CATHETERIZATION      MULTIPLE    CARDIAC SURGERY  1995    CLOT REMOVED    CHEST SURGERY  11/1995    REPAIR CHEST BONE SURGERY    CHOLECYSTECTOMY  1978    CORONARY ARTERY BYPASS GRAFT      5 VESSELS    GALLBLADDER SURGERY      HEART TRANSPLANT      HEART TRANSPLANT  06/17/2008    HEART TRANSPLANT  2008    KNEE ARTHROPLASTY Left 11/2006    KNEE ARTHROSCOPY Left 1994    KNEE CARTILAGE SURGERY Right 10/17/2017    KNEE SURGERY Bilateral     PAIN PUMP INSERTION/REVISION N/A 07/16/2021    Procedure: PAIN PUMP INSERTION, SPINAL CORD STIMULATOR REMOVAL PRIALT +++ LOW DOSE++;  Surgeon: Stevo Brian MD;  [place] [facility];  Service: Pain Management;  Laterality: N/A;    PAIN PUMP TRIAL INJECTION ONLY N/A 06/18/2021    Procedure: Injection Single for Pain Pump Trial;  Surgeon: Stevo Brian MD;  Location:  [facility];  Service: Pain Management;  Laterality: N/A;    PROSTATE SURGERY      REPAIR    SHOULDER SURGERY Right     SPINAL CORD STIMULATOR IMPLANT N/A 04/20/2018    Procedure: SPINAL CORD STIMULATOR INSERTION PHASE 2;  Surgeon: Stevo Brian MD;  Location: [facility] MAIN OR;  Service: Pain Management    TOTAL KNEE ARTHROPLASTY REVISION Left 10/30/2009    TOTAL KNEE ARTHROPLASTY REVISION Right     TOTAL SHOULDER REPLACEMENT Right 08/02/2023    revision         FAMILY HISTORY  Family History   Problem Relation Age of Onset    Stroke Mother     Dementia Mother     Arthritis Mother     Cancer Mother     Diabetes Mother     Hypertension Mother     Heart disease Mother     Hyperlipidemia Mother     Thyroid disease Mother     Heart disease Father     Hyperlipidemia Father     Hypertension Father     Arthritis Father          SOCIAL HISTORY  Social History     Occupational History    Not on file   Tobacco Use    Smoking status: Former    Smokeless tobacco: Never   Vaping Use    Vaping status: Never Used   Substance and Sexual Activity    Alcohol use: No    Drug use: No    Sexual activity: Defer         CURRENT MEDICATIONS    Current Outpatient Medications:     Accu-Chek Guide test strip, 3 (Three) Times a Day. as directed, Disp: , Rfl:     albuterol (PROVENTIL HFA;VENTOLIN HFA) 108 (90 Base) MCG/ACT inhaler, Inhale 2 puffs Every 4 (Four) Hours As Needed for Wheezing., Disp: , Rfl:     amLODIPine (NORVASC) 10 MG tablet, , Disp: , Rfl:     aspirin 81 MG EC tablet, Take 1 tablet by mouth. HOLD PRIOR TO SURGERY, Disp: , Rfl:     atorvastatin (LIPITOR) 10 MG tablet, , Disp: , Rfl:     B-D UF III MINI PEN NEEDLES 31G X 5 MM misc, 1 each by Other route 3 (Three) Times a Day., Disp: , Rfl:     busPIRone (BUSPAR) 15 MG tablet, 10 mg., Disp: , Rfl:     cabergoline (DOSTINEX) 0.5 MG tablet, Take 1 tablet by mouth 2 (Two) Times a Week., Disp: , Rfl:     chlorthalidone (HYGROTON) 25 MG tablet, Take 1 tablet by mouth Every  Morning., Disp: , Rfl:     Cholecalciferol (VITAMIN D3) 2000 units tablet, Take 1 tablet by mouth Every Evening. HOLD PRIOR TO SURGERY, Disp: , Rfl:     cloNIDine (CATAPRES-TTS) 0.3 MG/24HR patch, Place 1 patch on the skin as directed by provider 1 (One) Time Per Week., Disp: , Rfl:     clopidogrel (PLAVIX) 75 MG tablet, Take 1 tablet by mouth once daily, Disp: 30 tablet, Rfl: 5    cyanocobalamin 1000 MCG/ML injection, Inject 1 mL into the appropriate muscle as directed by prescriber Every 28 (Twenty-Eight) Days., Disp: , Rfl:     DULoxetine (CYMBALTA) 60 MG capsule, Take 1 capsule by mouth., Disp: , Rfl:     FeroSul 325 (65 Fe) MG tablet, Take 1 tablet by mouth Daily With Breakfast., Disp: , Rfl:     hydrALAZINE (APRESOLINE) 100 MG tablet, Take 1 tablet by mouth 3 (Three) Times a Day., Disp: , Rfl:     insulin aspart (novoLOG) 100 UNIT/ML injection, SLIDING SCALE, Disp: , Rfl:     Insulin Disposable Pump (V-GO 30) kit, Inject 1 Device under the skin., Disp: , Rfl:     Lantus SoloStar 100 UNIT/ML injection pen, INJECT 5 UNITS SUBCUTANEOUSLY EVERY EVENING AND TITRATE UP TO MAX 20 UNITS PER DAY, Disp: , Rfl:     Magnesium Oxide (MAG-OXIDE PO), Take 1,200 mg by mouth., Disp: , Rfl:     memantine (NAMENDA XR) 28 MG capsule sustained-release 24 hr extended release capsule, Take 1 capsule by mouth Daily., Disp: 90 capsule, Rfl: 3    metoprolol succinate XL (TOPROL-XL) 25 MG 24 hr tablet, TAKE 1/2 (ONE-HALF) TABLET BY MOUTH EVERY DAY AT BEDTIME, Disp: , Rfl:     Morphine (MS CONTIN) 60 MG 12 hr tablet, Take 1 tablet by mouth 2 (Two) Times a Day. Taking 60 mg in the Am and 30 mg in the evening., Disp: , Rfl:     Multiple Vitamin (MULTIVITAMIN) tablet, Take 1 tablet by mouth Daily. HOLD PRIOR TO SURGERY, Disp: , Rfl:     mycophenolate (CELLCEPT) 250 MG capsule, Take 3 capsules by mouth., Disp: , Rfl:     omeprazole (priLOSEC) 40 MG capsule, Take 1 capsule by mouth Every Morning., Disp: , Rfl:     ondansetron (ZOFRAN) 8 MG  "tablet, As Needed., Disp: , Rfl:     rivastigmine (EXELON) 6 MG capsule, Take 1 capsule by mouth twice daily, Disp: 180 capsule, Rfl: 3    sennosides-docusate (PERICOLACE) 8.6-50 MG per tablet, Take 2 tablets by mouth Daily., Disp: , Rfl:     tacrolimus (PROGRAF) 1 MG capsule, Take 1 capsule by mouth., Disp: , Rfl:     tamsulosin (FLOMAX) 0.4 MG capsule 24 hr capsule, Take 1 capsule by mouth Daily. TAKES @ 1200 NOON, Disp: , Rfl:     Testosterone Cypionate (DEPOTESTOTERONE CYPIONATE) 200 MG/ML injection, Inject 0.5 mL into the appropriate muscle as directed by prescriber 1 (One) Time Per Week., Disp: , Rfl:     Unable to find, Take 1 each by mouth 3 (Three) Times a Day. Med Name: domeperidone 10 mg, Disp: , Rfl:     ziconotide (PF) 25 mcg/mL, by Intrathecal route Continuous., Disp: 20 mL, Rfl: 0    ALLERGIES  Coreg [carvedilol], Singulair [montelukast sodium], and Theophyllines    VITALS  Vitals:    04/05/24 1008   BP: 134/84   BP Location: Left arm   Patient Position: Sitting   Cuff Size: Adult   Weight: 74.3 kg (163 lb 12.8 oz)   Height: 180.3 cm (70.98\")       PHYSICAL EXAM  Debilities/Disabilities Identified: None  Emotional Behavior: Appropriate  Wt Readings from Last 3 Encounters:   04/05/24 74.3 kg (163 lb 12.8 oz)   02/20/24 74.5 kg (164 lb 3.2 oz)   02/13/24 76.1 kg (167 lb 12.8 oz)     Ht Readings from Last 1 Encounters:   04/05/24 180.3 cm (70.98\")     Body mass index is 22.86 kg/m².  Physical Exam  Constitutional:       General: He is not in acute distress.     Appearance: Normal appearance. He is not ill-appearing.   HENT:      Head: Normocephalic and atraumatic.      Mouth/Throat:      Mouth: Mucous membranes are moist.      Pharynx: No posterior oropharyngeal erythema.   Eyes:      General: No scleral icterus.  Cardiovascular:      Rate and Rhythm: Normal rate and regular rhythm.      Heart sounds: Normal heart sounds.   Pulmonary:      Effort: Pulmonary effort is normal.      Breath sounds: Normal " breath sounds.   Abdominal:      General: Abdomen is flat. Bowel sounds are normal. There is no distension.      Palpations: Abdomen is soft. There is no mass.      Tenderness: There is no abdominal tenderness in the right lower quadrant, left upper quadrant and left lower quadrant. There is no guarding or rebound. Negative signs include Pacheco's sign.      Hernia: No hernia is present.   Musculoskeletal:      Cervical back: Neck supple.   Skin:     General: Skin is warm.      Capillary Refill: Capillary refill takes less than 2 seconds.   Neurological:      General: No focal deficit present.      Mental Status: He is alert and oriented to person, place, and time.   Psychiatric:         Mood and Affect: Mood normal.         Behavior: Behavior normal.         Thought Content: Thought content normal.         Judgment: Judgment normal.         CLINICAL DATA REVIEWED   reviewed previous lab results and integrated with today's visit, reviewed notes from other physicians and/or last GI encounter, reviewed previous endoscopy results and available photos, reviewed surgical pathology results from previous biopsies    ASSESSMENT  Diagnoses and all orders for this visit:    Personal history of colonic polyps  -     Case Request; Standing  -     Case Request    Gastroparesis    Therapeutic opioid-induced constipation (OIC)    Other orders  -     Follow Anesthesia Guidelines / Protocol; Future  -     Verify bowel prep was successful; Standing  -     Give tap water enema if bowel prep was insufficient; Standing  -     Obtain Informed Consent; Standing          PLAN    Recommend 1/2 dose miralax at night  Colon with 2 day prep    Return in about 6 months (around 10/5/2024).    I have discussed the above plan with the patient.  They verbalize understanding and are in agreement with the plan.  They have been advised to contact the office for any questions, concerns, or changes related to their health.

## 2024-04-05 NOTE — TELEPHONE ENCOUNTER
Seen in the office today, 04/05/2024 by ABDULKADIR Garcia.  EGD & Colonoscopy was ordered.    Scheduled at [place] on 06/26/2024 at 11:45am - arrive 10:30am.  2 day prep was given in office.    On PLAVIX - need to reach out to Dr. Raul Damico.

## 2024-04-24 ENCOUNTER — OFFICE VISIT (OUTPATIENT)
Dept: PAIN MEDICINE | Facility: CLINIC | Age: 79
End: 2024-04-24
Payer: MEDICARE

## 2024-04-24 VITALS
DIASTOLIC BLOOD PRESSURE: 74 MMHG | BODY MASS INDEX: 23.52 KG/M2 | OXYGEN SATURATION: 93 % | HEIGHT: 71 IN | HEART RATE: 71 BPM | SYSTOLIC BLOOD PRESSURE: 153 MMHG | WEIGHT: 168 LBS | TEMPERATURE: 98.6 F

## 2024-04-24 DIAGNOSIS — Z97.8 PRESENCE OF INTRATHECAL PUMP: ICD-10-CM

## 2024-04-24 DIAGNOSIS — G89.29 OTHER CHRONIC PAIN: Primary | ICD-10-CM

## 2024-04-24 DIAGNOSIS — M48.061 SPINAL STENOSIS OF LUMBAR REGION, UNSPECIFIED WHETHER NEUROGENIC CLAUDICATION PRESENT: ICD-10-CM

## 2024-04-24 PROCEDURE — 1125F AMNT PAIN NOTED PAIN PRSNT: CPT | Performed by: NURSE PRACTITIONER

## 2024-04-24 PROCEDURE — 62369 ANAL SP INF PMP W/REPRG&FILL: CPT | Performed by: NURSE PRACTITIONER

## 2024-04-24 RX ORDER — LEVOTHYROXINE SODIUM 0.05 MG/1
50 TABLET ORAL DAILY
COMMUNITY
Start: 2024-04-13

## 2024-04-24 RX ORDER — SPIRONOLACTONE 25 MG/1
25 TABLET ORAL DAILY
COMMUNITY

## 2024-04-24 NOTE — PROGRESS NOTES
CHIEF COMPLAINT  F/U back pain/pump refill- patient states that his pain has remained the same since his last visit.     Subjective   Saurav Cotto . is a 79 y.o. male  who presents for follow-up.  He has a history of back pain.  Pain today 4/10 VAS. Pain stable with current regimen.  He presents today for IT pain pump refill (Prialt). Last visit he was reporting adverse side effects after we very modestly increased the IT medication.  He reported significant drowsiness and feeling weak all over.   We thus reduced him back to previous rate. Today he states that these symptoms have completely resolved.  He is happy with the current settings.        IT Prialt is infusing at at total daily dose of 1.512 mcg/day (1.411mcg/day continuous rate with scheduled bolus of 0.1mcg dose over 30 minutes at 5:30am).  Pain is stable with regimen.  No adverse reactions reported.       Prescribed oral Morphine by PCP. Has been unable to successfully wean.    Back Pain  This is a chronic problem. The current episode started more than 1 year ago. The problem occurs daily. The problem has been waxing and waning since onset. The pain is present in the lumbar spine. The quality of the pain is described as aching and burning. The pain is at a severity of 4/10. The pain is mild. Associated symptoms include weakness (jarek legs). Pertinent negatives include no abdominal pain, chest pain, dysuria, fever, headaches or numbness. Treatments tried: IT pump - Prialt. The treatment provided moderate relief.        PEG Assessment   What number best describes your pain on average in the past week?3  What number best describes how, during the past week, pain has interfered with your enjoyment of life?4  What number best describes how, during the past week, pain has interfered with your general activity?  7    Review of Pertinent Medical Data ---      The following portions of the patient's history were reviewed and updated as appropriate: allergies,  "current medications, past family history, past medical history, past social history, past surgical history, and problem list.    Review of Systems   Constitutional:  Negative for activity change, chills, fatigue and fever.   HENT:  Negative for congestion.    Eyes:  Negative for visual disturbance.   Respiratory:  Negative for chest tightness and shortness of breath.    Cardiovascular:  Negative for chest pain.   Gastrointestinal:  Positive for constipation and diarrhea. Negative for abdominal pain.   Genitourinary:  Negative for difficulty urinating and dysuria.   Musculoskeletal:  Positive for back pain.   Neurological:  Positive for weakness (jarek legs). Negative for dizziness, light-headedness, numbness and headaches.   Psychiatric/Behavioral:  Negative for agitation and sleep disturbance. The patient is not nervous/anxious.        I have reviewed and confirmed the accuracy of the ROS as documented by the MA/LPN/RN ABDULKADIR Rowe    Vitals:    04/24/24 0950   BP: 153/74   Pulse: 71   Temp: 98.6 °F (37 °C)   SpO2: 93%   Weight: 76.2 kg (168 lb)   Height: 180.3 cm (71\")   PainSc:   4   PainLoc: Back         Objective   Physical Exam  Vitals and nursing note reviewed.   Constitutional:       General: He is not in acute distress.     Appearance: Normal appearance. He is not ill-appearing.   Pulmonary:      Effort: Pulmonary effort is normal. No respiratory distress.   Musculoskeletal:      Lumbar back: Tenderness and bony tenderness present.   Neurological:      Mental Status: He is alert and oriented to person, place, and time.      Motor: Motor function is intact. No weakness.      Gait: Gait abnormal (slowed).   Psychiatric:         Mood and Affect: Mood normal.         Behavior: Behavior normal.       Intrathecal pump was interrogated in office today. Results are in chart.     The patient is currently at a dose of 1.411mcg/day continuous with the addition of 0.1mcg scheduled bolus at 5:30am.Total daily " dose is 1.512mcg/day.       Under sterile technique, the pump was accessed using a Eletrog[employer] proprietary refill kit. The volume was withdrawn from the pump. The expected residual volume of the pump was 14.3 ml. The actual residual volume of the pump was 14 ml.     The pump was then refilled with 20 ml of Prialt at a concentration of 25.0 mcg/ml.  The pump was set to continue at its previous rate.       Patient tolerated procedure well. Minimal bleeding was noted. Pump site remains intact with no evidence of erythema or drainage.     Estimated battery replacement March 2028.     Pump refill in 84 days    Assessment & Plan   Diagnoses and all orders for this visit:    1. Other chronic pain (Primary)    2. Presence of intrathecal pump    3. Spinal stenosis of lumbar region, unspecified whether neurogenic claudication present      --- IT pump refilled and reprogrammed as documented. No changes made to regimen.      Saurav Cotto Sr. reports a pain score of 4.  Given his pain assessment as noted, treatment options were discussed and the following options were decided upon as a follow-up plan to address the patient's pain: continuation of current treatment plan for pain.      --- Follow-up for next pump refill

## 2024-05-02 ENCOUNTER — TELEPHONE (OUTPATIENT)
Dept: GASTROENTEROLOGY | Facility: CLINIC | Age: 79
End: 2024-05-02
Payer: MEDICARE

## 2024-05-02 NOTE — TELEPHONE ENCOUNTER
Good morning.  Patient is scheduled for EGD & Colonoscopy on 06/26/2024.  He is taking Plavix.  Can he hold prior to procedure?  If so, how many days?    Thank you!  Michelle -

## 2024-05-03 ENCOUNTER — TELEPHONE (OUTPATIENT)
Dept: NEUROLOGY | Facility: CLINIC | Age: 79
End: 2024-05-03
Payer: MEDICARE

## 2024-05-03 DIAGNOSIS — G31.84 MCI (MILD COGNITIVE IMPAIRMENT) WITH MEMORY LOSS: ICD-10-CM

## 2024-05-03 RX ORDER — RIVASTIGMINE TARTRATE 6 MG/1
6 CAPSULE ORAL 2 TIMES DAILY
Qty: 180 CAPSULE | Refills: 3 | Status: CANCELLED | OUTPATIENT
Start: 2024-05-03

## 2024-05-03 NOTE — TELEPHONE ENCOUNTER
Caller: Saurav Cotto Sr.    Relationship: Self    Best call back number: [phone]    Requested Prescriptions:   Requested Prescriptions     Pending Prescriptions Disp Refills    rivastigmine (EXELON) 6 MG capsule 180 capsule 3     Sig: Take 1 capsule by mouth 2 (Two) Times a Day.        Pharmacy where request should be sent: [facility] [address] [place] [address] [phone] [facility] [phone]      Last office visit with prescribing clinician: 2/20/2024   Last telemedicine visit with prescribing clinician: Visit date not found   Next office visit with prescribing clinician: Visit date not found     Additional details provided by patient: PT WILL BE OUT ON 5-6-24    Does the patient have less than a 3 day supply:  [x] Yes  [] No    Would you like a call back once the refill request has been completed: [] Yes [] No    If the office needs to give you a call back, can they leave a voicemail: [] Yes [] No    Jan Marsh Rep   05/03/24 12:52 EDT

## 2024-05-03 NOTE — TELEPHONE ENCOUNTER
Caller: Saurav Cotto Sr.    Relationship: Self    Best call back number: [phone]    What was the call regarding: PT STATES HE FOUND HIS MEDICATION AND REFILL IS NOT NEEDED AT THIS TIME. REORDER CANCELLED.    Do you require a callback: NO    DOCUMENTING PER PROTOCOL.

## 2024-05-13 DIAGNOSIS — G31.84 MCI (MILD COGNITIVE IMPAIRMENT) WITH MEMORY LOSS: Primary | ICD-10-CM

## 2024-05-13 RX ORDER — MEMANTINE HYDROCHLORIDE 28 MG/1
28 CAPSULE, EXTENDED RELEASE ORAL DAILY
Qty: 90 CAPSULE | Refills: 3 | Status: SHIPPED | OUTPATIENT
Start: 2024-05-13

## 2024-06-06 ENCOUNTER — HOSPITAL ENCOUNTER (EMERGENCY)
Facility: HOSPITAL | Age: 79
Discharge: HOME | End: 2024-06-06
Attending: EMERGENCY MEDICINE | Admitting: EMERGENCY MEDICINE
Payer: MEDICARE

## 2024-06-06 VITALS
TEMPERATURE: 98.3 F | SYSTOLIC BLOOD PRESSURE: 179 MMHG | WEIGHT: 173 LBS | RESPIRATION RATE: 16 BRPM | HEIGHT: 71 IN | HEART RATE: 95 BPM | DIASTOLIC BLOOD PRESSURE: 100 MMHG | OXYGEN SATURATION: 96 % | BODY MASS INDEX: 24.22 KG/M2

## 2024-06-06 DIAGNOSIS — S01.502A: Primary | ICD-10-CM

## 2024-06-06 LAB
ALBUMIN SERPL-MCNC: 3.4 G/DL (ref 3.5–5.2)
ALBUMIN/GLOB SERPL: 1.3 G/DL
ALP SERPL-CCNC: 210 U/L (ref 39–117)
ALT SERPL W P-5'-P-CCNC: 22 U/L (ref 1–41)
ANION GAP SERPL CALCULATED.3IONS-SCNC: 11.1 MMOL/L (ref 5–15)
AST SERPL-CCNC: 25 U/L (ref 1–40)
BASOPHILS # BLD AUTO: 0.01 10*3/MM3 (ref 0–0.2)
BASOPHILS NFR BLD AUTO: 0.1 % (ref 0–1.5)
BILIRUB SERPL-MCNC: 0.3 MG/DL (ref 0–1.2)
BUN SERPL-MCNC: 35 MG/DL (ref 8–23)
BUN/CREAT SERPL: 37.6 (ref 7–25)
CALCIUM SPEC-SCNC: 9.2 MG/DL (ref 8.6–10.5)
CHLORIDE SERPL-SCNC: 98 MMOL/L (ref 98–107)
CO2 SERPL-SCNC: 23.9 MMOL/L (ref 22–29)
CREAT SERPL-MCNC: 0.93 MG/DL (ref 0.76–1.27)
DEPRECATED RDW RBC AUTO: 47.5 FL (ref 37–54)
EGFRCR SERPLBLD CKD-EPI 2021: 84 ML/MIN/1.73
EOSINOPHIL # BLD AUTO: 0.02 10*3/MM3 (ref 0–0.4)
EOSINOPHIL NFR BLD AUTO: 0.3 % (ref 0.3–6.2)
ERYTHROCYTE [DISTWIDTH] IN BLOOD BY AUTOMATED COUNT: 13.4 % (ref 12.3–15.4)
GLOBULIN UR ELPH-MCNC: 2.7 GM/DL
GLUCOSE SERPL-MCNC: 242 MG/DL (ref 65–99)
HCT VFR BLD AUTO: 34 % (ref 37.5–51)
HGB BLD-MCNC: 10.6 G/DL (ref 13–17.7)
IMM GRANULOCYTES # BLD AUTO: 0.03 10*3/MM3 (ref 0–0.05)
IMM GRANULOCYTES NFR BLD AUTO: 0.4 % (ref 0–0.5)
LYMPHOCYTES # BLD AUTO: 0.57 10*3/MM3 (ref 0.7–3.1)
LYMPHOCYTES NFR BLD AUTO: 7.3 % (ref 19.6–45.3)
MCH RBC QN AUTO: 29.4 PG (ref 26.6–33)
MCHC RBC AUTO-ENTMCNC: 31.2 G/DL (ref 31.5–35.7)
MCV RBC AUTO: 94.2 FL (ref 79–97)
MONOCYTES # BLD AUTO: 0.86 10*3/MM3 (ref 0.1–0.9)
MONOCYTES NFR BLD AUTO: 11 % (ref 5–12)
NEUTROPHILS NFR BLD AUTO: 6.35 10*3/MM3 (ref 1.7–7)
NEUTROPHILS NFR BLD AUTO: 80.9 % (ref 42.7–76)
PLATELET # BLD AUTO: 290 10*3/MM3 (ref 140–450)
PMV BLD AUTO: 9 FL (ref 6–12)
POTASSIUM SERPL-SCNC: 4.4 MMOL/L (ref 3.5–5.2)
PROT SERPL-MCNC: 6.1 G/DL (ref 6–8.5)
RBC # BLD AUTO: 3.61 10*6/MM3 (ref 4.14–5.8)
SODIUM SERPL-SCNC: 133 MMOL/L (ref 136–145)
WBC NRBC COR # BLD AUTO: 7.84 10*3/MM3 (ref 3.4–10.8)

## 2024-06-06 PROCEDURE — 99283 EMERGENCY DEPT VISIT LOW MDM: CPT

## 2024-06-06 PROCEDURE — 36415 COLL VENOUS BLD VENIPUNCTURE: CPT

## 2024-06-06 PROCEDURE — 85025 COMPLETE CBC W/AUTO DIFF WBC: CPT | Performed by: NURSE PRACTITIONER

## 2024-06-06 PROCEDURE — 80053 COMPREHEN METABOLIC PANEL: CPT | Performed by: NURSE PRACTITIONER

## 2024-06-06 RX ORDER — LIDOCAINE HYDROCHLORIDE AND EPINEPHRINE BITARTRATE 20; .01 MG/ML; MG/ML
10 INJECTION, SOLUTION SUBCUTANEOUS ONCE
Status: COMPLETED | OUTPATIENT
Start: 2024-06-06 | End: 2024-06-06

## 2024-06-06 RX ADMIN — LIDOCAINE HYDROCHLORIDE AND EPINEPHRINE 10 ML: 20; 10 INJECTION, SOLUTION INFILTRATION; PERINEURAL at 15:07

## 2024-06-06 NOTE — ED PROVIDER NOTES
"Subjective   History of Present Illness  79-year-old  male patient presented to the emergency department via private vehicle with a chief complaint of postoperative bleeding.  Patient states that he had a tongue lesion removed yesterday at the oral surgeons office.  He states that they placed sutures on his tongue where they took the lesion off.  He states the sutures have \"fallen out\" and he began having bleeding last night.  He states they called the oral surgeon's office and they told him that no one would be there until 1 PM.  Patient states he has had a lesion on his tongue for \"quite a while\" and it was not healing.  This is the reason why they did a surgical intervention yesterday.    History provided by:  Medical records, relative and patient      Review of Systems   Constitutional: Negative.    HENT:  Positive for mouth sores. Negative for congestion, dental problem, drooling, ear discharge, ear pain, facial swelling, hearing loss, nosebleeds, postnasal drip and rhinorrhea.         Postoperative tongue bleeding   Respiratory: Negative.     Cardiovascular: Negative.    Gastrointestinal: Negative.    Musculoskeletal: Negative.    Skin: Negative.    Neurological: Negative.    Hematological: Negative.    Psychiatric/Behavioral: Negative.     All other systems reviewed and are negative.      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    Vitamin B 12 deficiency        Allergies   Allergen Reactions    Coreg [Carvedilol] Hives    Singulair [Montelukast Sodium] " Other (See Comments)     weakness    Theophyllines Other (See Comments)     weakness       Past Surgical History:   Procedure Laterality Date    APPENDECTOMY      CARDIAC CATHETERIZATION      MULTIPLE    CARDIAC SURGERY  1995    CLOT REMOVED    CHEST SURGERY  11/1995    REPAIR CHEST BONE SURGERY    CHOLECYSTECTOMY  1978    CORONARY ARTERY BYPASS GRAFT      5 VESSELS    GALLBLADDER SURGERY      HEART TRANSPLANT      HEART TRANSPLANT  06/17/2008    HEART TRANSPLANT  2008    KNEE ARTHROPLASTY Left 11/2006    KNEE ARTHROSCOPY Left 1994    KNEE CARTILAGE SURGERY Right 10/17/2017    KNEE SURGERY Bilateral     PAIN PUMP INSERTION/REVISION N/A 07/16/2021    Procedure: PAIN PUMP INSERTION, SPINAL CORD STIMULATOR REMOVAL PRIALT +++ LOW DOSE++;  Surgeon: Stevo Brian MD;  Location: [facility] MAIN OR;  Service: Pain Management;  Laterality: N/A;    PAIN PUMP TRIAL INJECTION ONLY N/A 06/18/2021    Procedure: Injection Single for Pain Pump Trial;  Surgeon: Stevo Brian MD;  Location: [facility] MAIN OR;  Service: Pain Management;  Laterality: N/A;    PROSTATE SURGERY      REPAIR    SHOULDER SURGERY Right     SPINAL CORD STIMULATOR IMPLANT N/A 04/20/2018    Procedure: SPINAL CORD STIMULATOR INSERTION PHASE 2;  Surgeon: Stevo Brian MD;  Location: [facility] MAIN OR;  Service: Pain Management    TOTAL KNEE ARTHROPLASTY REVISION Left 10/30/2009    TOTAL KNEE ARTHROPLASTY REVISION Right     TOTAL SHOULDER REPLACEMENT Right 08/02/2023    revision       Family History   Problem Relation Age of Onset    Stroke Mother     Dementia Mother     Arthritis Mother     Cancer Mother     Diabetes Mother     Hypertension Mother     Heart disease Mother     Hyperlipidemia Mother     Thyroid disease Mother     Heart disease Father     Hyperlipidemia Father     Hypertension Father     Arthritis Father        Social History     Socioeconomic History    Marital status:    Tobacco Use    Smoking status: Former    Smokeless tobacco: Never    Vaping Use    Vaping status: Never Used   Substance and Sexual Activity    Alcohol use: No    Drug use: No    Sexual activity: Defer           Objective   Physical Exam  Vitals and nursing note reviewed.   Constitutional:       General: He is not in acute distress.     Appearance: He is normal weight. He is not ill-appearing, toxic-appearing or diaphoretic.   HENT:      Head: Normocephalic and atraumatic.      Jaw: There is normal jaw occlusion.      Right Ear: External ear normal.      Left Ear: External ear normal.      Nose: Nose normal.      Mouth/Throat:      Mouth: Mucous membranes are moist.      Pharynx: Oropharynx is clear.     Eyes:      Extraocular Movements: Extraocular movements intact.      Pupils: Pupils are equal, round, and reactive to light.   Cardiovascular:      Rate and Rhythm: Normal rate and regular rhythm.      Heart sounds: Normal heart sounds.   Pulmonary:      Effort: Pulmonary effort is normal.      Breath sounds: Normal breath sounds.   Abdominal:      General: Abdomen is scaphoid. Bowel sounds are normal. There is no distension or abdominal bruit. There are no signs of injury.      Palpations: Abdomen is soft.      Tenderness: There is no abdominal tenderness.   Skin:     General: Skin is warm and dry.      Capillary Refill: Capillary refill takes less than 2 seconds.   Neurological:      General: No focal deficit present.      Mental Status: He is alert and oriented to person, place, and time.   Psychiatric:         Mood and Affect: Mood normal.         Behavior: Behavior normal.         Thought Content: Thought content normal.         Judgment: Judgment normal.         Laceration Repair    Date/Time: 6/6/2024 3:04 PM    Performed by: aKrlo Holland APRN  Authorized by: Camden Madsen MD    Consent:     Consent obtained:  Verbal    Consent given by:  Patient    Risks discussed:  Infection, pain, retained foreign body, tendon damage, vascular damage, poor wound healing, poor  cosmetic result, nerve damage and need for additional repair    Alternatives discussed:  No treatment, delayed treatment, observation and referral  Universal protocol:     Procedure explained and questions answered to patient or proxy's satisfaction: yes      Relevant documents present and verified: yes      Test results available: yes      Imaging studies available: yes      Required blood products, implants, devices, and special equipment available: yes      Site/side marked: yes      Immediately prior to procedure, a time out was called: yes      Patient identity confirmed:  Verbally with patient, arm band and hospital-assigned identification number  Anesthesia:     Anesthesia method:  Local infiltration    Local anesthetic:  Lidocaine 2% WITH epi  Laceration details:     Location: tounge.    Length (cm):  1    Depth (mm):  0.5  Treatment:     Area cleansed with:  Saline  Skin repair:     Repair method:  Sutures    Suture size:  4-0    Suture material:  Chromic gut    Suture technique:  Figure eight  Approximation:     Approximation:  Close  Repair type:     Repair type:  Simple  Post-procedure details:     Dressing:  Open (no dressing)             ED Course                                             Medical Decision Making  Differential diagnosis includes oral cancer, apophysis ulcer, postoperative bleeding, oral lesion, herpes simplex virus, and cellulitis.    Results for orders placed or performed during the hospital encounter of 06/06/24  -CBC Auto Differential:   Specimen: Blood       Result                      Value             Ref Range           WBC                         7.84              3.40 - 10.80*       RBC                         3.61 (L)          4.14 - 5.80 *       Hemoglobin                  10.6 (L)          13.0 - 17.7 *       Hematocrit                  34.0 (L)          37.5 - 51.0 %       MCV                         94.2              79.0 - 97.0 *       MCH                         29.4               26.6 - 33.0 *       MCHC                        31.2 (L)          31.5 - 35.7 *       RDW                         13.4              12.3 - 15.4 %       RDW-SD                      47.5              37.0 - 54.0 *       MPV                         9.0               6.0 - 12.0 fL       Platelets                   290               140 - 450 10*       Neutrophil %                80.9 (H)          42.7 - 76.0 %       Lymphocyte %                7.3 (L)           19.6 - 45.3 %       Monocyte %                  11.0              5.0 - 12.0 %        Eosinophil %                0.3               0.3 - 6.2 %         Basophil %                  0.1               0.0 - 1.5 %         Immature Grans %            0.4               0.0 - 0.5 %         Neutrophils, Absolute       6.35              1.70 - 7.00 *       Lymphocytes, Absolute       0.57 (L)          0.70 - 3.10 *       Monocytes, Absolute         0.86              0.10 - 0.90 *       Eosinophils, Absolute       0.02              0.00 - 0.40 *       Basophils, Absolute         0.01              0.00 - 0.20 *       Immature Grans, Absolu*     0.03              0.00 - 0.05 *  -Comprehensive Metabolic Panel:   Specimen: Blood       Result                      Value             Ref Range           Glucose                     242 (H)           65 - 99 mg/dL       BUN                         35 (H)            8 - 23 mg/dL        Creatinine                  0.93              0.76 - 1.27 *       Sodium                      133 (L)           136 - 145 mm*       Potassium                   4.4               3.5 - 5.2 mm*       Chloride                    98                98 - 107 mmo*       CO2                         23.9              22.0 - 29.0 *       Calcium                     9.2               8.6 - 10.5 m*       Total Protein               6.1               6.0 - 8.5 g/*       Albumin                     3.4 (L)           3.5 - 5.2 g/*       ALT (SGPT)                   22                1 - 41 U/L          AST (SGOT)                  25                1 - 40 U/L          Alkaline Phosphatase        210 (H)           39 - 117 U/L        Total Bilirubin             0.3               0.0 - 1.2 mg*       Globulin                    2.7               gm/dL               A/G Ratio                   1.3               g/dL                BUN/Creatinine Ratio        37.6 (H)          7.0 - 25.0          Anion Gap                   11.1              5.0 - 15.0 m*       eGFR                        84.0              >60.0 mL/min*  Results for orders placed or performed during the hospital encounter of 07/16/21  -POC Glucose STAT:   Specimen: Blood       Result                      Value             Ref Range           Glucose                     125               70 - 130 mg/*  Results for orders placed or performed during the hospital encounter of 07/14/21  -ECG 12 Lead:        Result                      Value             Ref Range           QT Interval                 333               ms             Results for orders placed or performed in visit on 07/14/21  -CBC Auto Differential:   Specimen: Blood       Result                      Value             Ref Range           WBC                         8.07              3.40 - 10.80*       RBC                         4.29              4.14 - 5.80 *       Hemoglobin                  13.2              13.0 - 17.7 *       Hematocrit                  40.8              37.5 - 51.0 %       MCV                         95.1              79.0 - 97.0 *       MCH                         30.8              26.6 - 33.0 *       MCHC                        32.4              31.5 - 35.7 *       RDW                         14.9              12.3 - 15.4 %       RDW-SD                      51.7              37.0 - 54.0 *       MPV                         8.4               6.0 - 12.0 fL       Platelets                   321               140 - 450 10*        Neutrophil %                70.7              42.7 - 76.0 %       Lymphocyte %                10.5 (L)          19.6 - 45.3 %       Monocyte %                  16.1 (H)          5.0 - 12.0 %        Eosinophil %                1.5               0.3 - 6.2 %         Basophil %                  0.5               0.0 - 1.5 %         Immature Grans %            0.7 (H)           0.0 - 0.5 %         Neutrophils, Absolute       5.70              1.70 - 7.00 *       Lymphocytes, Absolute       0.85              0.70 - 3.10 *       Monocytes, Absolute         1.30 (H)          0.10 - 0.90 *       Eosinophils, Absolute       0.12              0.00 - 0.40 *       Basophils, Absolute         0.04              0.00 - 0.20 *       Immature Grans, Absolu*     0.06 (H)          0.00 - 0.05 *       nRBC                        0.0               0.0 - 0.2 /1*  -MRSA Screen Culture (Outpatient) - Swab, Nares:   Specimen: Nares; Swab       Result                      Value             Ref Range           MRSA Screen Cx                                                No Methicillin Resistant Staphylococcus aureus isolated  -Hemoglobin A1c:   Specimen: Blood       Result                      Value             Ref Range           Hemoglobin A1C              6.70 (H)          4.80 - 5.60 %  -Basic Metabolic Panel:   Specimen: Blood       Result                      Value             Ref Range           Glucose                     115 (H)           65 - 99 mg/dL       BUN                         27 (H)            8 - 23 mg/dL        Creatinine                  1.04              0.76 - 1.27 *       Sodium                      133 (L)           136 - 145 mm*       Potassium                   5.0               3.5 - 5.2 mm*       Chloride                    98                98 - 107 mmo*       CO2                         28.8              22.0 - 29.0 *       Calcium                     9.9               8.6 - 10.5 m*       eGFR Non   Amer       70                >60 mL/min/1*       BUN/Creatinine Ratio        26.0 (H)          7.0 - 25.0          Anion Gap                   6.2               5.0 - 15.0 m*  Results for orders placed or performed in visit on 07/14/21  -COVID-19,[facility], NP/OP SWAB IN UTM/VTM/[facility]SP[facility] MEDIA,24 HR TAT - Swab, Nasopharynx:   Specimen: Nasopharynx; Swab       Result                      Value             Ref Range           COVID19                     Not Detected      Not Detected*  Results for orders placed or performed during the hospital encounter of 06/18/21  -POC Glucose STAT:   Specimen: Blood       Result                      Value             Ref Range           Glucose                     141 (A)           70 - 130 mg/*  Results for orders placed or performed during the hospital encounter of 04/20/18  -POC Glucose Once:   Specimen: Blood       Result                      Value             Ref Range           Glucose                     132 (H)           70 - 130 mg/*  -POC Glucose Once:   Specimen: Blood       Result                      Value             Ref Range           Glucose                     91                70 - 130 mg/*  Results for orders placed or performed in visit on 04/17/18  -CBC Auto Differential:   Specimen: Blood       Result                      Value             Ref Range           WBC                         5.52              4.50 - 10.70*       RBC                         4.01 (L)          4.60 - 6.00 *       Hemoglobin                  13.0 (L)          13.7 - 17.6 *       Hematocrit                  41.1              40.4 - 52.2 %       MCV                         102.5 (H)         79.8 - 96.2 *       MCH                         32.4              27.0 - 32.7 *       MCHC                        31.6 (L)          32.6 - 36.4 *       RDW                         12.7              11.5 - 14.5 %       RDW-SD                      47.6              37.0 - 54.0  *       MPV                         9.7               6.0 - 12.0 fL       Platelets                   240               140 - 500 10*       Neutrophil %                69.7              42.7 - 76.0 %       Lymphocyte %                13.0 (L)          19.6 - 45.3 %       Monocyte %                  15.0 (H)          5.0 - 12.0 %        Eosinophil %                1.6               0.3 - 6.2 %         Basophil %                  0.2               0.0 - 1.5 %         Immature Grans %            0.5               0.0 - 0.5 %         Neutrophils, Absolute       3.84              1.90 - 8.10 *       Lymphocytes, Absolute       0.72 (L)          0.90 - 4.80 *       Monocytes, Absolute         0.83              0.20 - 1.20 *       Eosinophils, Absolute       0.09              0.00 - 0.70 *       Basophils, Absolute         0.01              0.00 - 0.20 *       Immature Grans, Absolu*     0.03              0.00 - 0.03 *    *Note: Due to a large number of results and/or encounters for the requested time period, some results have not been displayed. A complete set of results can be found in Results Review.     Discussed laboratory and assessment findings.  Patient has open wound which is bleeding to the inferior tip of the tongue.  See procedure note.  Patient should follow-up with oral surgery as discussed.  Patient should see his PCP as discussed.  Patient should return to the emergency department symptoms get worse or change.  Patient understands and agrees to discharge plan.    Problems Addressed:  Open tongue wound, initial encounter: complicated acute illness or injury    Amount and/or Complexity of Data Reviewed  Labs: ordered. Decision-making details documented in ED Course.    Risk  Prescription drug management.        Final diagnoses:   Open tongue wound, initial encounter       ED Disposition  ED Disposition       ED Disposition   Discharge    Condition   Stable    Comment   --               [place]  [address] [address] [address] [phone]    Schedule an appointment as soon as possible for a visit in 1 week      Oral surgeon    Call today  Call today to schedule an appointment for follow-up         Medication List      No changes were made to your prescriptions during this visit.            Karlo Holland, APRN  06/06/24 1507

## 2024-06-25 ENCOUNTER — ANESTHESIA EVENT (OUTPATIENT)
Dept: PERIOP | Facility: HOSPITAL | Age: 79
End: 2024-06-25
Payer: MEDICARE

## 2024-06-26 ENCOUNTER — ANESTHESIA (OUTPATIENT)
Dept: PERIOP | Facility: HOSPITAL | Age: 79
End: 2024-06-26
Payer: MEDICARE

## 2024-06-26 ENCOUNTER — HOSPITAL ENCOUNTER (OUTPATIENT)
Facility: HOSPITAL | Age: 79
Setting detail: HOSPITAL OUTPATIENT SURGERY
Discharge: HOME | End: 2024-06-26
Attending: INTERNAL MEDICINE | Admitting: INTERNAL MEDICINE
Payer: MEDICARE

## 2024-06-26 VITALS
RESPIRATION RATE: 20 BRPM | WEIGHT: 153 LBS | OXYGEN SATURATION: 99 % | TEMPERATURE: 97.6 F | BODY MASS INDEX: 21.34 KG/M2 | HEART RATE: 71 BPM | SYSTOLIC BLOOD PRESSURE: 155 MMHG | DIASTOLIC BLOOD PRESSURE: 85 MMHG

## 2024-06-26 DIAGNOSIS — Z86.010 PERSONAL HISTORY OF COLONIC POLYPS: ICD-10-CM

## 2024-06-26 LAB — GLUCOSE BLDC GLUCOMTR-MCNC: 103 MG/DL (ref 70–130)

## 2024-06-26 PROCEDURE — 88342 IMHCHEM/IMCYTCHM 1ST ANTB: CPT | Performed by: INTERNAL MEDICINE

## 2024-06-26 PROCEDURE — 25010000002 PROPOFOL 200 MG/20ML EMULSION

## 2024-06-26 PROCEDURE — 25810000003 LACTATED RINGERS PER 1000 ML: Performed by: NURSE ANESTHETIST, CERTIFIED REGISTERED

## 2024-06-26 PROCEDURE — 88305 TISSUE EXAM BY PATHOLOGIST: CPT | Performed by: INTERNAL MEDICINE

## 2024-06-26 PROCEDURE — 82948 REAGENT STRIP/BLOOD GLUCOSE: CPT

## 2024-06-26 RX ORDER — SODIUM CHLORIDE 0.9 % (FLUSH) 0.9 %
10 SYRINGE (ML) INJECTION AS NEEDED
Status: DISCONTINUED | OUTPATIENT
Start: 2024-06-26 | End: 2024-06-26 | Stop reason: HOSPADM

## 2024-06-26 RX ORDER — PROPOFOL 10 MG/ML
INJECTION, EMULSION INTRAVENOUS AS NEEDED
Status: DISCONTINUED | OUTPATIENT
Start: 2024-06-26 | End: 2024-06-26 | Stop reason: SURG

## 2024-06-26 RX ORDER — LIDOCAINE HYDROCHLORIDE 20 MG/ML
INJECTION, SOLUTION INFILTRATION; PERINEURAL AS NEEDED
Status: DISCONTINUED | OUTPATIENT
Start: 2024-06-26 | End: 2024-06-26 | Stop reason: SURG

## 2024-06-26 RX ORDER — ONDANSETRON 2 MG/ML
4 INJECTION INTRAMUSCULAR; INTRAVENOUS ONCE AS NEEDED
Status: DISCONTINUED | OUTPATIENT
Start: 2024-06-26 | End: 2024-06-26 | Stop reason: HOSPADM

## 2024-06-26 RX ORDER — SODIUM CHLORIDE 9 MG/ML
40 INJECTION, SOLUTION INTRAVENOUS AS NEEDED
Status: DISCONTINUED | OUTPATIENT
Start: 2024-06-26 | End: 2024-06-26 | Stop reason: HOSPADM

## 2024-06-26 RX ORDER — SODIUM CHLORIDE, SODIUM LACTATE, POTASSIUM CHLORIDE, CALCIUM CHLORIDE 600; 310; 30; 20 MG/100ML; MG/100ML; MG/100ML; MG/100ML
100 INJECTION, SOLUTION INTRAVENOUS ONCE
Status: DISCONTINUED | OUTPATIENT
Start: 2024-06-26 | End: 2024-06-26 | Stop reason: HOSPADM

## 2024-06-26 RX ORDER — SODIUM CHLORIDE, SODIUM LACTATE, POTASSIUM CHLORIDE, CALCIUM CHLORIDE 600; 310; 30; 20 MG/100ML; MG/100ML; MG/100ML; MG/100ML
9 INJECTION, SOLUTION INTRAVENOUS CONTINUOUS
Status: DISCONTINUED | OUTPATIENT
Start: 2024-06-26 | End: 2024-06-26 | Stop reason: HOSPADM

## 2024-06-26 RX ORDER — LIDOCAINE HYDROCHLORIDE 10 MG/ML
0.5 INJECTION, SOLUTION EPIDURAL; INFILTRATION; INTRACAUDAL; PERINEURAL ONCE AS NEEDED
Status: DISCONTINUED | OUTPATIENT
Start: 2024-06-26 | End: 2024-06-26 | Stop reason: HOSPADM

## 2024-06-26 RX ORDER — SODIUM CHLORIDE 0.9 % (FLUSH) 0.9 %
10 SYRINGE (ML) INJECTION EVERY 12 HOURS SCHEDULED
Status: DISCONTINUED | OUTPATIENT
Start: 2024-06-26 | End: 2024-06-26 | Stop reason: HOSPADM

## 2024-06-26 RX ADMIN — PROPOFOL 110 MG: 10 INJECTION, EMULSION INTRAVENOUS at 11:28

## 2024-06-26 RX ADMIN — LIDOCAINE HYDROCHLORIDE 60 MG: 20 INJECTION, SOLUTION INFILTRATION; PERINEURAL at 11:25

## 2024-06-26 RX ADMIN — PROPOFOL 40 MG: 10 INJECTION, EMULSION INTRAVENOUS at 11:25

## 2024-06-26 RX ADMIN — SODIUM CHLORIDE, POTASSIUM CHLORIDE, SODIUM LACTATE AND CALCIUM CHLORIDE 9 ML/HR: 600; 310; 30; 20 INJECTION, SOLUTION INTRAVENOUS at 10:57

## 2024-06-26 NOTE — ANESTHESIA PREPROCEDURE EVALUATION
Anesthesia Evaluation     Patient summary reviewed and Nursing notes reviewed   NPO Solid Status: > 8 hours  NPO Liquid Status: > 2 hours           Airway   Mallampati: I  Dental - normal exam     Pulmonary - normal exam   (+) a smoker (none for 30 years) Former, COPD (albuterol as needed, none recent), asthma,sleep apnea (does not use machine)  Cardiovascular - normal exam  Exercise tolerance: poor (<4 METS)    Rhythm: regular  Rate: normal    (+) hypertension well controlled 2 medications or greater, past MI (prior to heart transplant)  >12 months, CAD, CABG    ROS comment: Heart transplant 16 years ago  ASA yesterday, Plavix 3 days ago    12/6/2023  Stress Echo Findings   Negative, asymptomatic to chest pain, Dobutamine stress EKG for ischemia with a heart rate at maximum infusion reaching 90%           Neuro/Psych  (+) TIA, CVA, headaches, numbness (peripheral neuropathy)Dizziness: loses balance easily.    ROS Comment: Mild cognitive impairment  GI/Hepatic/Renal/Endo    (+) GERD well controlled, diabetes mellitus () well controlled using insulin, thyroid problem hypothyroidism    ROS Comment: Weight loss  History of esophageal varices. (Family unaware)  gastroparesis    Musculoskeletal     (+) back pain (spinal stenosis), chronic pain      ROS comment: Pain pump  Abdominal  - normal exam   Substance History - negative use     OB/GYN negative ob/gyn ROS         Other   arthritis (TKA),     ROS/Med Hx Other: Water with meds 0800                Anesthesia Plan    ASA 3     MAC     intravenous induction     Anesthetic plan, risks, benefits, and alternatives have been provided, discussed and informed consent has been obtained with: patient and spouse/significant other.    Use of blood products discussed with patient and spouse/significant other  Consented to blood products.      CODE STATUS:

## 2024-06-26 NOTE — ANESTHESIA POSTPROCEDURE EVALUATION
Patient: Saurav Cotto Sr.    Procedure Summary       Date: 06/26/24 Room / Location: [facility] ENDOSCOPY 1 /  [facility] OR    Anesthesia Start: 1120 Anesthesia Stop: 1200    Procedure: COLONOSCOPY WITH POLYPECTOMY Diagnosis:       Personal history of colonic polyps      Diverticulosis      Colon polyp      (Personal history of colonic polyps [Z86.010])    Surgeons: True Cassidy MD Provider: Daryl Obrien CRNA    Anesthesia Type: MAC ASA Status: 3            Anesthesia Type: MAC    Vitals  Vitals Value Taken Time   /84 06/26/24 1210   Temp 97.6 °F (36.4 °C) 06/26/24 1203   Pulse 75 06/26/24 1214   Resp 18 06/26/24 1203   SpO2 95 % 06/26/24 1214   Vitals shown include unfiled device data.        Post Anesthesia Care and Evaluation    Patient location during evaluation: PHASE II  Patient participation: complete - patient participated  Level of consciousness: awake  Pain management: adequate    Airway patency: patent  Anesthetic complications: No anesthetic complications  PONV Status: none  Cardiovascular status: acceptable  Respiratory status: acceptable  Hydration status: acceptable

## 2024-06-26 NOTE — BRIEF OP NOTE
COLONOSCOPY WITH POLYPECTOMY  Progress Note    Gailkylah EDMAR Cotto Sr.  6/26/2024    Pre-op Diagnosis:   Personal history of colonic polyps [Z86.010]       Post-Op Diagnosis Codes:     * Personal history of colonic polyps [Z86.010]     * Diverticulosis [K57.90]     * Colon polyp [K63.5]    Procedure/CPT® Codes:        Procedure(s):  COLONOSCOPY WITH POLYPECTOMY              Surgeon(s):  True Cassidy MD    Anesthesia: Monitored Anesthesia Care    Staff:   Circulator: Pedrito Reyes RN  Scrub Person: Isaac Virgen; Fallon Toro; Sharla Monroy         Estimated Blood Loss: none    Urine Voided: * No values recorded between 6/26/2024 11:20 AM and 6/26/2024 11:54 AM *    Specimens:                Specimens       ID Source Type Tests Collected By Collected At Frozen?    A Large Intestine, Transverse Colon Polyp TISSUE PATHOLOGY EXAM   True Cassidy MD 6/26/24 1143 No    Description: x3    This specimen was not marked as sent.    B Large Intestine, Rectum Polyp TISSUE PATHOLOGY EXAM   True Cassidy MD 6/26/24 1152 No    This specimen was not marked as sent.                  Drains: * No LDAs found *    Findings: Colon to TI good Prep  Sigmoid Diverticulosis  [id number]        Complications: none          True Cassidy MD     Date: 6/26/2024  Time: 11:59 EDT

## 2024-06-26 NOTE — H&P
Patient Care Team:  Shahla Pierre MD as PCP - General (Family Medicine)    CHIEF COMPLAINT: Personal hx colon polyps    HISTORY OF PRESENT ILLNESS:  Last Colon was > 5 years ago    Past Medical History:   Diagnosis Date    Acid reflux     Anemia     Anxiety     Asthma     Chronic back pain     Colon polyp     Coronary artery disease 1991    1st heart attack    Depression     Disease of thyroid gland     Esophageal varices     Hypertension     Joint pain     Low back pain     MCI (mild cognitive impairment) with memory loss     Myocardial infarct 1991, 1994, 1995, 2002, 2005,    Neuropathy in diabetes     Osteoarthritis     Peripheral neuropathy     Sleep apnea     CPAP    Stroke (cerebrum) 11/2022    Vitamin B 12 deficiency      Past Surgical History:   Procedure Laterality Date    APPENDECTOMY      CARDIAC CATHETERIZATION      MULTIPLE    CARDIAC SURGERY  1995    CLOT REMOVED    CHEST SURGERY  11/1995    REPAIR CHEST BONE SURGERY    CHOLECYSTECTOMY  1978    CORONARY ARTERY BYPASS GRAFT      5 VESSELS    GALLBLADDER SURGERY      HEART TRANSPLANT  06/17/2008    KNEE ARTHROPLASTY Left 11/2006    KNEE ARTHROSCOPY Left 1994    KNEE CARTILAGE SURGERY Right 10/17/2017    KNEE SURGERY Bilateral     PAIN PUMP INSERTION/REVISION N/A 07/16/2021    Procedure: PAIN PUMP INSERTION, SPINAL CORD STIMULATOR REMOVAL PRIALT +++ LOW DOSE++;  Surgeon: Stevo Brian MD;  [place] [facility] [place];  Service: Pain Management;  Laterality: N/A;    PAIN PUMP TRIAL INJECTION ONLY N/A 06/18/2021    Procedure: Injection Single for Pain Pump Trial;  Surgeon: Stevo Brian MD;  [place] [facility] [place];  Service: Pain Management;  Laterality: N/A;    PROSTATE SURGERY      REPAIR    SHOULDER SURGERY Right     SPINAL CORD STIMULATOR IMPLANT N/A 04/20/2018    Procedure: SPINAL CORD STIMULATOR INSERTION PHASE 2;  Surgeon: Stevo Brian MD;  Location: [facility] MAIN OR;  Service: Pain Management    TOTAL KNEE ARTHROPLASTY REVISION Left  10/30/2009    TOTAL KNEE ARTHROPLASTY REVISION Right     TOTAL SHOULDER REPLACEMENT Right 08/02/2023    revision     Family History   Problem Relation Age of Onset    Stroke Mother     Dementia Mother     Arthritis Mother     Cancer Mother     Diabetes Mother     Hypertension Mother     Heart disease Mother     Hyperlipidemia Mother     Thyroid disease Mother     Heart disease Father     Hyperlipidemia Father     Hypertension Father     Arthritis Father      Social History     Tobacco Use    Smoking status: Former    Smokeless tobacco: Never   Vaping Use    Vaping status: Never Used   Substance Use Topics    Alcohol use: No    Drug use: No     Medications Prior to Admission   Medication Sig Dispense Refill Last Dose    albuterol (PROVENTIL HFA;VENTOLIN HFA) 108 (90 Base) MCG/ACT inhaler Inhale 2 puffs Every 4 (Four) Hours As Needed for Wheezing.   Past Month    amLODIPine (NORVASC) 10 MG tablet    6/25/2024    aspirin 81 MG EC tablet Take 1 tablet by mouth. HOLD PRIOR TO SURGERY   6/25/2024    atorvastatin (LIPITOR) 10 MG tablet    6/25/2024    busPIRone (BUSPAR) 15 MG tablet 10 mg.   6/25/2024    cabergoline (DOSTINEX) 0.5 MG tablet Take 1 tablet by mouth 2 (Two) Times a Week.   Past Week    chlorthalidone (HYGROTON) 25 MG tablet Take 1 tablet by mouth Every Morning.   6/26/2024    Cholecalciferol (VITAMIN D3) 2000 units tablet Take 1 tablet by mouth Every Evening. HOLD PRIOR TO SURGERY   6/25/2024    cloNIDine (CATAPRES-TTS) 0.3 MG/24HR patch Place 1 patch on the skin as directed by provider 1 (One) Time Per Week.   6/26/2024    cyanocobalamin 1000 MCG/ML injection Inject 1 mL into the appropriate muscle as directed by prescriber Every 28 (Twenty-Eight) Days.   Past Month    DULoxetine (CYMBALTA) 60 MG capsule Take 1 capsule by mouth.   6/25/2024    FeroSul 325 (65 Fe) MG tablet Take 1 tablet by mouth Daily With Breakfast.   Past Week    Lantus SoloStar 100 UNIT/ML injection pen INJECT 5 UNITS SUBCUTANEOUSLY EVERY  EVENING AND TITRATE UP TO MAX 20 UNITS PER DAY   6/25/2024    levothyroxine (SYNTHROID, LEVOTHROID) 50 MCG tablet Take 1 tablet by mouth Daily.   6/25/2024    Magnesium Oxide (MAG-OXIDE PO) Take 1,200 mg by mouth.   6/25/2024    memantine (NAMENDA XR) 28 MG capsule sustained-release 24 hr extended release capsule Take 1 capsule by mouth once daily 90 capsule 3 6/25/2024    metoprolol succinate XL (TOPROL-XL) 25 MG 24 hr tablet TAKE 1/2 (ONE-HALF) TABLET BY MOUTH EVERY DAY AT BEDTIME   6/26/2024    Morphine (MS CONTIN) 60 MG 12 hr tablet Take 1 tablet by mouth 2 (Two) Times a Day. Taking 60 mg in the Am and 30 mg in the evening.   6/25/2024    Multiple Vitamin (MULTIVITAMIN) tablet Take 1 tablet by mouth Daily. HOLD PRIOR TO SURGERY   6/25/2024    mycophenolate (CELLCEPT) 250 MG capsule Take 3 capsules by mouth.   6/25/2024    omeprazole (priLOSEC) 40 MG capsule Take 1 capsule by mouth Every Morning.   6/25/2024    ondansetron (ZOFRAN) 8 MG tablet As Needed.   6/26/2024    rivastigmine (EXELON) 6 MG capsule Take 1 capsule by mouth twice daily 180 capsule 3 6/25/2024    tacrolimus (PROGRAF) 1 MG capsule Take 1 capsule by mouth.   6/25/2024    tamsulosin (FLOMAX) 0.4 MG capsule 24 hr capsule Take 1 capsule by mouth Daily. TAKES @ 1200 NOON   6/25/2024    Testosterone Cypionate (DEPOTESTOTERONE CYPIONATE) 200 MG/ML injection Inject 0.5 mL into the appropriate muscle as directed by prescriber 1 (One) Time Per Week.   Past Week    Unable to find Take 1 each by mouth 3 (Three) Times a Day. Med Name: domeperidone 10 mg   6/25/2024    ziconotide (PF) 25 mcg/mL by Intrathecal route Continuous. 20 mL 0 6/26/2024    Accu-Chek Guide test strip 3 (Three) Times a Day. as directed       B-D UF III MINI PEN NEEDLES 31G X 5 MM misc 1 each by Other route 3 (Three) Times a Day.       clopidogrel (PLAVIX) 75 MG tablet Take 1 tablet by mouth once daily 30 tablet 5 6/23/2024    hydrALAZINE (APRESOLINE) 100 MG tablet Take 1 tablet by mouth  3 (Three) Times a Day.       insulin aspart (novoLOG) 100 UNIT/ML injection SLIDING SCALE   More than a month    Insulin Disposable Pump (V-GO 30) kit Inject 1 Device under the skin.       sennosides-docusate (PERICOLACE) 8.6-50 MG per tablet Take 2 tablets by mouth Daily. (Patient not taking: Reported on 4/24/2024)        Allergies:  Coreg [carvedilol], Singulair [montelukast sodium], and Theophyllines    REVIEW OF SYSTEMS:  Please see the above history of present illness for pertinent positives and negatives.  The remainder of the patient's systems have been reviewed and are negative.     Vital Signs            Physical Exam:  Physical Exam   Constitutional: Patient appears well-developed and well-nourished and in no acute distress   HEENT:   Head: Normocephalic and atraumatic.   Eyes:  Pupils are equal, round, and reactive to light. EOM are intact. Sclerae are anicteric and non-injected.  Mouth and Throat: Patient has moist mucous membranes. Oropharynx is clear of any erythema or exudate.     Neck: Neck supple. No JVD present. No thyromegaly present. No lymphadenopathy present.  Cardiovascular: Regular rate, regular rhythm, S1 normal and S2 normal.  Exam reveals no gallop and no friction rub.  No murmur heard.  Pulmonary/Chest: Lungs are clear to auscultation bilaterally. No respiratory distress. No wheezes. No rhonchi. No rales.   Abdominal: Soft. Bowel sounds are normal. No distension and no mass. There is no hepatosplenomegaly. There is no tenderness.   Musculoskeletal: Normal Muscle tone  Extremities: No edema. Pulses are palpable in all 4 extremities.  Neurological: Patient is alert and oriented to person, place, and time. Cranial nerves II-XII are grossly intact with no focal deficits.  Skin: Skin is warm. No rash noted. Nails show no clubbing.  No cyanosis or erythema.    Debilities/Disabilities Identified: None  Emotional Behavior: Appropriate     Results Review:   I reviewed the patient's new clinical  results.    Lab Results (most recent)       None            Imaging Results (Most Recent)       None          reviewed    ECG/EMG Results (most recent)       None          reviewed    Assessment & Plan   Personal hx colon polyps/  colonoscopy      I discussed the patient's findings and my recommendations with patient.     True Cassidy MD  06/26/24  10:47 EDT    Time: 10 min prior to procedure.

## 2024-06-27 ENCOUNTER — TELEPHONE (OUTPATIENT)
Dept: PAIN MEDICINE | Facility: CLINIC | Age: 79
End: 2024-06-27

## 2024-06-27 NOTE — TELEPHONE ENCOUNTER
Caller: Saurav Cotto Sr.    Relationship to patient: Self    Best call back number: [phone] (home) [phone] (work)    Type of visit: PUMP REFILL     Requested date: 2-3 WEEKS AFTER 7/12/24    If rescheduling, when is the original appointment: 7/17/24    Additional notes: MR COTTO IS HAVING SHOULDER SX ON 7/12/24

## 2024-06-28 LAB
LAB AP CASE REPORT: NORMAL
PATH REPORT.ADDENDUM SPEC: NORMAL
PATH REPORT.FINAL DX SPEC: NORMAL
PATH REPORT.GROSS SPEC: NORMAL

## 2024-07-29 ENCOUNTER — TELEPHONE (OUTPATIENT)
Dept: PAIN MEDICINE | Facility: CLINIC | Age: 79
End: 2024-07-29

## 2024-07-29 NOTE — TELEPHONE ENCOUNTER
Spoke to patient, there are no appointments that are later than 3:30 to r/s appointment. I also told the patient he would need to speak to Carmita during his office visit about Dr. Brian taking over his morphine prescription.

## 2024-07-29 NOTE — TELEPHONE ENCOUNTER
Caller: Catia Fernandez    Relationship: Emergency Contact    Best call back number: [phone]    What was the call regarding: PT'S WIFE CALLING TO SEE ABOUT GETTING PT'S APPT ON 08/02/24 MOVED TO AFTER 3:30PM OR MOVED TO ANOTHER DAY IF IT IS OK WITH THEE NIETO. THE APPT IS FOR A PUMP REFILL, SO PT IS UNSURE IF IT IS OK TO MOVE TO ANOTHER DAY.     ADDITIONALLY, PT'S PCP IS NO LONGER GOING TO CONTINUE TO PRESCRIBE PAIN MEDICATIONS FOR THE PT. PT WOULD LIKE TO KNOW IF DR BRANDT OR THEE NIETO WOULD BE ABLE TO TAKE ON THOSE PRESCRIPTIONS FOR THE PT. PLEASE CONTACT THE PT AT THE NUMBER ABOVE TO DISCUSS THESE ISSUES.

## 2024-07-30 NOTE — TELEPHONE ENCOUNTER
When I talked to him yesterday, when I told him there were no later appointments that day, he said he would just keep it as is.

## 2024-07-30 NOTE — TELEPHONE ENCOUNTER
Fátima - ok to schedule him on a different day if necessary.  He will not be out of medication, just past the recommended refill date.    Dr. Brian - please see the question regarding his oral Morphine.

## 2024-08-02 ENCOUNTER — TELEPHONE (OUTPATIENT)
Dept: PAIN MEDICINE | Facility: CLINIC | Age: 79
End: 2024-08-02

## 2024-08-02 NOTE — TELEPHONE ENCOUNTER
Caller: Catia Fernandez    Relationship: Emergency Contact    Best call back number:[phone]    Which medication are you concerned about: MORPHINE-     Who prescribed you this medication: DR. CHAVEZ BOLIVAR      What are your concerns: WIFE IS CALLING TO CHECK TO SEE IF THEE NIETO WAS ABLE TO CHECK TO SEE IF DR. BRANDT CAN TAKE OVER PRESCRIBING PAIN MEDS FOR PT. WIFE STATES THAT DR. BOLIVAR WILL STOP  DOING THE PAIN MANAGEMENT AT THE END OF AUGUST  AND GOING TO 2 DAYS A WEEK, SO PT NEEDS A NEW PHYSICIAN.    PLEASE CONTACT WIFE AND ADVISE

## 2024-08-02 NOTE — TELEPHONE ENCOUNTER
Please let him know that I have sent. Dr. Brian a message regarding this.  He is on a higher dose of Morphine than we typically prescribe so it will be up to Dr. Brian in terms of if he is comfortable prescribing this.  Mr. Cotto should know that he will be required to be seen monthly for this medication to be filled by out clinic as well.

## 2024-08-05 ENCOUNTER — TELEPHONE (OUTPATIENT)
Dept: PAIN MEDICINE | Facility: CLINIC | Age: 79
End: 2024-08-05

## 2024-08-05 NOTE — TELEPHONE ENCOUNTER
I spoke with Mrs. Cotto and she states that Mr. Cotto has been vomiting all weekend. She states that he has previously had shoulder surgery on 7/12/2024 and got pneumonia afterwards. She states that he was hospitalized and had a dangerously low sodium. She is concern his sodium is very low again. She states that he is very weak and not eating. He has an appt tomorrow to refill his pain pump. I told her she should take him to the ER to be evaluated if is concerned that his sodium has dropped again and due to his symptoms. I did inform his wife her a previous message from ABDULKADIR Marques on 7/30/2024 that he won't be out of medication, just past the recommended refill date. She is going to take him to [facility] to be evaluated. I told her to call back and reschedule his appt after he's been evaluated.

## 2024-08-06 NOTE — TELEPHONE ENCOUNTER
I spoke with Mr. Cotto and informed him of the message. He understood. I tried to get his pain pump refill rescheduled because he cancelled his appt for today and he stated that he is currently admitted to [place] as of yesterday.

## 2024-08-15 ENCOUNTER — OFFICE VISIT (OUTPATIENT)
Dept: PAIN MEDICINE | Facility: CLINIC | Age: 79
End: 2024-08-15
Payer: MEDICARE

## 2024-08-15 VITALS
SYSTOLIC BLOOD PRESSURE: 132 MMHG | OXYGEN SATURATION: 99 % | HEIGHT: 71 IN | TEMPERATURE: 95.9 F | BODY MASS INDEX: 19.74 KG/M2 | DIASTOLIC BLOOD PRESSURE: 86 MMHG | HEART RATE: 68 BPM | WEIGHT: 141 LBS

## 2024-08-15 DIAGNOSIS — M48.061 SPINAL STENOSIS OF LUMBAR REGION, UNSPECIFIED WHETHER NEUROGENIC CLAUDICATION PRESENT: ICD-10-CM

## 2024-08-15 DIAGNOSIS — Z97.8 PRESENCE OF INTRATHECAL PUMP: ICD-10-CM

## 2024-08-15 DIAGNOSIS — G89.29 OTHER CHRONIC PAIN: Primary | ICD-10-CM

## 2024-08-15 NOTE — PROGRESS NOTES
CHIEF COMPLAINT  Pain pump refill.    Subjective   Saurav Cotto Sr. is a 79 y.o. male  who presents for follow-up.  He has a history of chronic back pain, multiple joint pain.  He presents today for IT pump refill (Prialt).      Interval history since previous office visit ---  Left total shoulder replacement 7/12/2024 Dr. Daryl Norton.  Hospital admission 8/5/2024 - 8/9/2024MR.  Abdominal pain and vomiting.  GI consulted, ERCP performed, no obstruction, only dilatation in the bile duct.  Has severe gastroparesis, considering gastric stimulator.  He is actually vomiting during today's visit, his weight is down 26 lbs since he was seen 3 months ago. He is currently residing in a skilled nursing facility.      Pain today 5/10 VAS.   IT Prialt is infusing at at total daily dose of 1.512 mcg/day (1.411mcg/day continuous rate with scheduled bolus of 0.1mcg dose over 30 minutes at 5:30am).  Pain is stable with regimen.  No adverse reactions reported.   Has not tolerated higher doses of Prialt despite multiple titration attempts.       Prescribed oral Morphine by PCP. Has been unable to successfully wean.  Unfortunately PCP is retiring.  The provider taking over is not willing/able to manage the morphine.  He would like to know if we can help with this in the future.    Back Pain  Associated symptoms include weakness (generalized). Pertinent negatives include no dysuria, fever or numbness.        PEG Assessment   What number best describes your pain on average in the past week?5  What number best describes how, during the past week, pain has interfered with your enjoyment of life?10  What number best describes how, during the past week, pain has interfered with your general activity?  8    Review of Pertinent Medical Data ---  I reviewed hospital admission data [facility] 8/5/2024-8/9/2024    The following portions of the patient's history were reviewed and updated as appropriate: allergies, current medications,  "past family history, past medical history, past social history, past surgical history, and problem list.    Review of Systems   Constitutional:  Negative for chills and fever.   Respiratory:  Negative for cough and shortness of breath.    Gastrointestinal:  Positive for constipation, diarrhea and nausea.   Genitourinary:  Negative for difficulty urinating and dysuria.   Musculoskeletal:  Positive for back pain.   Neurological:  Positive for weakness (generalized). Negative for dizziness, light-headedness and numbness.   Psychiatric/Behavioral:  Negative for agitation.      I have reviewed and confirmed the accuracy of the ROS as documented by the MA/LPN/RN ABDULKADIR Rowe    Vitals:    08/15/24 1255   BP: 132/86   BP Location: Left arm   Pulse: 68   Temp: 95.9 °F (35.5 °C)   TempSrc: Temporal   SpO2: 99%   Weight: 64 kg (141 lb)   Height: 180.3 cm (71\")   PainSc:   5   PainLoc: Back         Objective   Physical Exam  Vitals and nursing note reviewed.   Constitutional:       General: He is not in acute distress.     Appearance: Normal appearance. He is ill-appearing.   Pulmonary:      Effort: Pulmonary effort is normal. No respiratory distress.   Musculoskeletal:      Lumbar back: Tenderness present.        Back:    Neurological:      Mental Status: He is alert and oriented to person, place, and time.      Motor: Motor function is intact.      Gait: Gait abnormal (slowed, wheelchair).   Psychiatric:         Mood and Affect: Mood normal.         Behavior: Behavior normal.       Intrathecal pump was interrogated in office today. Results are in chart.     The patient is currently at a dose of 1.411 mcg/day continuous with the addition of 0.1mcg scheduled bolus at 5:30am.Total daily dose is 1.512 mcg/day.       Under sterile technique, the pump was accessed using a [employer] proprietary refill kit. Ultrasound guidance was required to access the pump due to difficulty.  The volume was withdrawn from the pump. The " expected residual volume of the pump was 13.2 ml. The actual residual volume of the pump was 13.1 ml.     The pump was then refilled with 20 ml of Prialt at a concentration of 25.0 mcg/ml.  The pump was set to continue at its previous rate.       Patient tolerated procedure well. Minimal bleeding was noted. Pump site remains intact with no evidence of erythema or drainage.     Estimated battery replacement March 2028.     Pump refill in 84 days    Assessment & Plan   Diagnoses and all orders for this visit:    1. Other chronic pain (Primary)    2. Presence of intrathecal pump    3. Spinal stenosis of lumbar region, unspecified whether neurogenic claudication present      --- Discussed this case in detail with Dr. Brian.   --- The plan will be to convert to intrathecal morphine rather than continue high dose oral morphine.  This is a more appropriate option for the patient due to the high oral morphine equivalency as well as his gastroparesis.    --- When converted to intrathecal morphine, Prialt will be discontinued.    --- Will order Morphine 2mg/ml, 20 ml solution  --- We will plan to start therapy at 0.25 mg of intrathecal morphine / day  --- We will titrate upward as necessary  --- We may consider home refills in the future as appropriate   --- UDS next visit     --- IT pump refilled/reprogrammed today as documented above. Ultrasound guidance required for pump refill.     Saurav Cotto Sr. reports a pain score of 5.  Given his pain assessment as noted, treatment options were discussed and the following options were decided upon as a follow-up plan to address the patient's pain:  see plan above .    --- Follow-up approximately 2 weeks for IT pump medication change/refill            WILDER REPORT    As the clinician, I personally reviewed the WILDER from 8/15/2024 while the patient was in the office today.    History and physical exam exhibit continued safe and appropriate use of controlled substances.        Dictated utilizing Dragon dictation.

## 2024-08-17 DIAGNOSIS — Z86.73 HISTORY OF TIA (TRANSIENT ISCHEMIC ATTACK): Primary | ICD-10-CM

## 2024-08-19 RX ORDER — CLOPIDOGREL BISULFATE 75 MG/1
75 TABLET ORAL DAILY
Qty: 90 TABLET | Refills: 0 | Status: SHIPPED | OUTPATIENT
Start: 2024-08-19

## 2024-08-27 ENCOUNTER — OFFICE VISIT (OUTPATIENT)
Dept: GASTROENTEROLOGY | Facility: CLINIC | Age: 79
End: 2024-08-27
Payer: MEDICARE

## 2024-08-27 ENCOUNTER — OFFICE VISIT (OUTPATIENT)
Age: 79
End: 2024-08-27
Payer: MEDICARE

## 2024-08-27 VITALS
DIASTOLIC BLOOD PRESSURE: 78 MMHG | WEIGHT: 144.6 LBS | TEMPERATURE: 98 F | OXYGEN SATURATION: 96 % | BODY MASS INDEX: 20.24 KG/M2 | HEIGHT: 71 IN | HEART RATE: 84 BPM | SYSTOLIC BLOOD PRESSURE: 136 MMHG

## 2024-08-27 VITALS
HEIGHT: 71 IN | WEIGHT: 144.6 LBS | DIASTOLIC BLOOD PRESSURE: 78 MMHG | SYSTOLIC BLOOD PRESSURE: 130 MMHG | BODY MASS INDEX: 20.24 KG/M2

## 2024-08-27 DIAGNOSIS — Z79.899 HIGH RISK MEDICATION USE: Primary | ICD-10-CM

## 2024-08-27 DIAGNOSIS — M48.061 SPINAL STENOSIS OF LUMBAR REGION, UNSPECIFIED WHETHER NEUROGENIC CLAUDICATION PRESENT: ICD-10-CM

## 2024-08-27 DIAGNOSIS — Z97.8 PRESENCE OF INTRATHECAL PUMP: ICD-10-CM

## 2024-08-27 DIAGNOSIS — G89.29 OTHER CHRONIC PAIN: ICD-10-CM

## 2024-08-27 DIAGNOSIS — K31.84 GASTROPARESIS: Primary | ICD-10-CM

## 2024-08-27 DIAGNOSIS — K59.03 THERAPEUTIC OPIOID-INDUCED CONSTIPATION (OIC): ICD-10-CM

## 2024-08-27 DIAGNOSIS — T40.2X5A THERAPEUTIC OPIOID-INDUCED CONSTIPATION (OIC): ICD-10-CM

## 2024-08-27 DIAGNOSIS — M54.16 LUMBAR RADICULOPATHY: ICD-10-CM

## 2024-08-27 PROCEDURE — 62369 ANAL SP INF PMP W/REPRG&FILL: CPT

## 2024-08-27 PROCEDURE — 1159F MED LIST DOCD IN RCRD: CPT

## 2024-08-27 PROCEDURE — 1160F RVW MEDS BY RX/DR IN RCRD: CPT

## 2024-08-27 PROCEDURE — 1125F AMNT PAIN NOTED PAIN PRSNT: CPT

## 2024-08-27 PROCEDURE — 99214 OFFICE O/P EST MOD 30 MIN: CPT

## 2024-08-27 RX ORDER — SPIRONOLACTONE 25 MG/1
25 TABLET ORAL DAILY
COMMUNITY

## 2024-08-27 RX ORDER — NALOXEGOL OXALATE 25 MG/1
1 TABLET, FILM COATED ORAL DAILY
COMMUNITY
Start: 2024-08-03

## 2024-08-27 RX ORDER — CABERGOLINE 0.5 MG/1
1 TABLET ORAL 2 TIMES WEEKLY
COMMUNITY
Start: 2024-07-18 | End: 2024-10-16

## 2024-08-27 RX ORDER — HYDROCODONE BITARTRATE AND ACETAMINOPHEN 5; 325 MG/1; MG/1
1 TABLET ORAL 2 TIMES DAILY
Qty: 14 TABLET | Refills: 0 | Status: SHIPPED | OUTPATIENT
Start: 2024-08-27

## 2024-08-27 NOTE — PROGRESS NOTES
PATIENT INFORMATION  Saurav Cotto Sr.       - 1945    CHIEF COMPLAINT  Chief Complaint   Patient presents with    Follow-up     Gastroparesis; OIC       HISTORY OF PRESENT ILLNESS  Here today for GP and OIC follow-up     Gastroparesis: Per LOV: Zofran every morning helps keeps things regulated and sometimes takes a 2nd dose. Domperidone has helped a lot, 3 times. Vomited maybe once since LOV. Omeprazole daily. Diet is ok, 3-4 meals a day, shakes in between. TODAY: Nausea often since being in rehab and was off domperidone, now back on and feeling better since. Dry heaves occasionally.      IBS-C: Per LOV: Miralax 1-2 days a week, with BM most days, then can skip 2 days and then can have some diarrhea. Foul gas at LOV and improved now, but did not notice a difference immediately after xifaxan. Out of probiotics right now. Off movantik now because was having diarrhea, but doing better off and on miralax. Cramping every once in while, maybe when skipping a few days, but not often. Taking docusate every day also. Pain pump and Ms Contin.  TODAY: difficulty regulating between diarrhea and constipation, skipping 3-4 days between BM, then diarrhea and can have incontinence. 1/2 dose of miralax every other day. Bleeding only when stopped up and has to strain.     Last Colon [facility], not sure when, >5 yrs did have polyps.     Heart transplant .  2024 Colonoscopy with diverticulosis and 3/4 adenomas        REVIEWED PERTINENT RESULTS/ LABS  Lab Results   Component Value Date    CASEREPORT  2024     Surgical Pathology Report                         Case: [id number]                                  Authorizing Provider:  True Cassidy        Collected:           2024 11:43 AM                                 MD Kirsten                                                                   Ordering Location:     [facility]   Received:            2024 12:43 PM                                  OR                                                                           Pathologist:           Derrick Sauer MD                                                         Specimens:   1) - Large Intestine, Transverse Colon, x3                                                          2) - Large Intestine, Rectum                                                               FINALDX  2024     1.  Colon, transverse, biopsy: Tubular adenomas    2.  Rectum, biopsy: Multiple fragments of benign colonic mucosa with   -A.  Fragments of polypoid granulation tissue with acute and chronic inflammation   -B.  Fragments suggesting hyperplastic polyp    Comment: A CMV stain will be performed and reported in an addendum       Lab Results   Component Value Date    HGB 11.8 (L) 2024    MCV 86.1 2024     2024    ALT 42 2024    AST 30 2024    HGBA1C 6.4 (H) 2024    TRIG 78 2020      XR Shoulder Comp Min 2 Vw LT    Result Date: 2024  Narrative: This result has an attachment that is not available. See    Impression: patient clinic note      CT 3 Phase Pancreas, Abd & Pel Without & With IV With Oral Contrast    Result Date: 2024  Narrative: REVIEWING YOUR TEST RESULTS IN [facility] IS NOT A SUBSTITUTE FOR DISCUSSING THOSE RESULTS WITH YOUR HEALTH CARE PROVIDER. PLEASE CONTACT YOUR PROVIDER VIA [facility] TO DISCUSS ANY QUESTIONS OR CONCERNS YOU MAY HAVE REGARDING THESE TEST RESULTS.  RADIOLOGY REPORT FACILITY:  [facility] UNIT/AGE/GENDER: [id number]  IN      AGE:79 Y          SEX:M PATIENT NAME/:  ESTEE MAYO    1945 UNIT NUMBER:  [id number] ACCOUNT NUMBER:  [id number] ACCESSION NUMBER:  [id number] EXAMINATION: 1. CT abdomen without and with contrast. 2. CT pelvis with contrast. DATE: 2024 at 0858 HISTORY: Complex patient Abdominal pain, acute, nonlocalized biliary dilation. TECHNIQUE: Pancreatic protocol with  imaging of the abdomen without and with intravenous contrast with continuation of imaging through the pelvis during the portal venous phase of enhancement. CT scans at this facility use dose modulation, iterative reconstruction, and/or weight-based dosing when appropriate to reduce radiation dose to as low as reasonably achievable (ALARA). COMPARISON: Outside CT of 8/5/2024 and older [place] CT of 11/10/2017 FINDINGS: There has been some progression of the now moderate dilation of intrahepatic and extrahepatic biliary ducts since 2017 though some dilation was present at that time. The dilation can be followed to the ampullary region with no visible obstructing etiology. Note is made that the bilirubin level is normal. Previous cholecystectomy. No focal liver lesions. The spleen and adrenal glands are unremarkable. The pancreas is normal in appearance with no dilation of the main pancreatic duct. Stable multifocal bilateral renal cortical scarring. Bilateral simple renal cysts. The ureters are normal. The prostate is enlarged with evidence of a previous TURP and Uro-lift procedure. There is hypertrophic bladder wall thickening. Sigmoid diverticulosis without diverticulitis. Other bowel loops are unremarkable. No adenopathy or ascites. The aorta is calcified and mildly ectatic as well as tortuous. There is a small loculated right basilar pleural effusion with smooth thickening of the overlying parietal pleura, new since 2017. There is multifocal scarring or atelectasis at both lung bases. No acute bone abnormality. An epidural catheter is present. There is degenerative change throughout the lumbar spine. IMPRESSION: 1. Slow progression of now moderate dilation of intrahepatic and extrahepatic biliary ducts to the ampullary level with no visible obstructing etiology. Given the normal bilirubin level, this is likely of no acute significance. Ampullary stenosis or a distal ductal stricture are most likely. 2. Sigmoid  diverticulosis. 3. Prostate enlargement with previous TURP and Uro-lift procedure. There is associated hypertrophic bladder wall thickening. 4. Small loculated right basilar pleural effusion with overlying smooth parietal pleural thickening has developed since 2017, perhaps relating to interval cardiothoracic surgery. Correlate with the timing of prior surgery. If there has been no interval surgery, other sources would have to be considered. Dictated by: Oskar Rendon M.D. Images and Report reviewed and interpreted by: Oskar Rendon M.D. <PS><Electronically signed by: Oskar Rendon M.D.> 08/06/2024 0953 D: 08/06/2024 0936 T: 08/06/2024 0936     REVIEW OF SYSTEMS  Review of Systems   Constitutional:  Positive for appetite change.   HENT: Negative.     Eyes: Negative.    Respiratory: Negative.     Cardiovascular: Negative.    Gastrointestinal:  Positive for abdominal distention, constipation, diarrhea and nausea.   Endocrine: Negative.    Genitourinary: Negative.    Musculoskeletal: Negative.    Skin: Negative.    Allergic/Immunologic: Negative.    Neurological: Negative.    Hematological:  Bruises/bleeds easily.   Psychiatric/Behavioral: Negative.           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]     Presence of intrathecal pump [Z97.8]     S/P insertion of intrathecal pump [Z98.890]     Other chronic pain [G89.29]     Lumbar radiculopathy [M54.16]     Spinal stenosis of lumbar region [M48.061]          PAST MEDICAL HISTORY  Past Medical History:   Diagnosis Date    Acid reflux     Anemia     Anxiety     Asthma     Cervical disc disorder     Chronic back pain     Chronic pain disorder     Colon polyp     Coronary artery disease 1991    1st heart attack    Depression     Disease of thyroid gland      Esophageal varices     Gastroparesis     Headache     Headache, tension-type     Hypertension     Joint pain     Low back pain     Lumbosacral disc disease     MCI (mild cognitive impairment) with memory loss     Myocardial infarct 1991, 1994, 1995, 2002, 2005,    Neuropathy in diabetes     Osteoarthritis     Peripheral neuropathy     Sleep apnea     CPAP    Spinal stenosis     Stroke (cerebrum) 11/2022    Vitamin B 12 deficiency          SURGICAL HISTORY  Past Surgical History:   Procedure Laterality Date    APPENDECTOMY      CARDIAC CATHETERIZATION      MULTIPLE    CARDIAC SURGERY  1995    CLOT REMOVED    CHEST SURGERY  11/1995    REPAIR CHEST BONE SURGERY    CHOLECYSTECTOMY  1978    COLONOSCOPY W/ POLYPECTOMY N/A 06/26/2024    Procedure: COLONOSCOPY WITH POLYPECTOMY;  Surgeon: True Cassidy MD;  Location: [facility] OR;  Service: Gastroenterology;  Laterality: N/A;  diverticulosis, transverse polyp x3, rectal polyp x1    CORONARY ARTERY BYPASS GRAFT      5 VESSELS    EPIDURAL BLOCK      GALLBLADDER SURGERY      HEART TRANSPLANT  06/17/2008    JOINT REPLACEMENT      Ò    KNEE ARTHROPLASTY Left 11/2006    KNEE ARTHROSCOPY Left 1994    KNEE CARTILAGE SURGERY Right 10/17/2017    KNEE SURGERY Bilateral     PAIN PUMP INSERTION/REVISION N/A 07/16/2021    Procedure: PAIN PUMP INSERTION, SPINAL CORD STIMULATOR REMOVAL PRIALT +++ LOW DOSE++;  Surgeon: Stevo Brian MD;  [place] [facility] [place];  Service: Pain Management;  Laterality: N/A;    PAIN PUMP TRIAL INJECTION ONLY N/A 06/18/2021    Procedure: Injection Single for Pain Pump Trial;  Surgeon: Stevo Brian MD;  [place] [facility] [place];  Service: Pain Management;  Laterality: N/A;    PROSTATE SURGERY      REPAIR    SHOULDER SURGERY Right     SPINAL CORD STIMULATOR IMPLANT N/A 04/20/2018    Procedure: SPINAL CORD STIMULATOR INSERTION PHASE 2;  Surgeon: Stevo Brian MD;  Location: [facility] OR;  Service: Pain Management    TOTAL KNEE  ARTHROPLASTY REVISION Left 10/30/2009    TOTAL KNEE ARTHROPLASTY REVISION Right     TOTAL SHOULDER ARTHROPLASTY Left 07/12/2024    TOTAL SHOULDER REPLACEMENT Right 08/02/2023    revision    TRIGGER POINT INJECTION           FAMILY HISTORY  Family History   Problem Relation Age of Onset    Stroke Mother     Dementia Mother     Arthritis Mother     Cancer Mother     Diabetes Mother     Hypertension Mother     Heart disease Mother     Hyperlipidemia Mother     Thyroid disease Mother     Coronary artery disease Mother     Heart disease Father     Hyperlipidemia Father     Hypertension Father     Arthritis Father     Coronary artery disease Father          SOCIAL HISTORY  Social History     Occupational History    Not on file   Tobacco Use    Smoking status: Former     Current packs/day: 1.00     Types: Cigarettes    Smokeless tobacco: Never   Vaping Use    Vaping status: Never Used   Substance and Sexual Activity    Alcohol use: No    Drug use: No    Sexual activity: Defer         CURRENT MEDICATIONS    Current Outpatient Medications:     Accu-Chek Guide test strip, 3 (Three) Times a Day. as directed, Disp: , Rfl:     albuterol (PROVENTIL HFA;VENTOLIN HFA) 108 (90 Base) MCG/ACT inhaler, Inhale 2 puffs Every 4 (Four) Hours As Needed for Wheezing., Disp: , Rfl:     amLODIPine (NORVASC) 10 MG tablet, , Disp: , Rfl:     aspirin 81 MG EC tablet, Take 1 tablet by mouth. HOLD PRIOR TO SURGERY, Disp: , Rfl:     atorvastatin (LIPITOR) 10 MG tablet, , Disp: , Rfl:     B-D UF III MINI PEN NEEDLES 31G X 5 MM misc, 1 each by Other route 3 (Three) Times a Day., Disp: , Rfl:     busPIRone (BUSPAR) 15 MG tablet, 10 mg., Disp: , Rfl:     cabergoline (DOSTINEX) 0.5 MG tablet, Take 1 tablet by mouth 2 (Two) Times a Week., Disp: , Rfl:     cabergoline (DOSTINEX) 0.5 MG tablet, Take 2 tablets by mouth 2 (Two) Times a Week., Disp: , Rfl:     chlorthalidone (HYGROTON) 25 MG tablet, Take 1 tablet by mouth Every Morning., Disp: , Rfl:      Cholecalciferol (VITAMIN D3) 2000 units tablet, Take 1 tablet by mouth Every Evening. HOLD PRIOR TO SURGERY, Disp: , Rfl:     cloNIDine (CATAPRES-TTS) 0.3 MG/24HR patch, Place 1 patch on the skin as directed by provider 1 (One) Time Per Week., Disp: , Rfl:     clopidogrel (PLAVIX) 75 MG tablet, Take 1 tablet by mouth once daily, Disp: 90 tablet, Rfl: 0    cyanocobalamin 1000 MCG/ML injection, Inject 1 mL into the appropriate muscle as directed by prescriber Every 28 (Twenty-Eight) Days., Disp: , Rfl:     DULoxetine (CYMBALTA) 60 MG capsule, Take 1 capsule by mouth., Disp: , Rfl:     FeroSul 325 (65 Fe) MG tablet, Take 1 tablet by mouth Daily With Breakfast., Disp: , Rfl:     hydrALAZINE (APRESOLINE) 100 MG tablet, Take 1 tablet by mouth 3 (Three) Times a Day., Disp: , Rfl:     insulin aspart (novoLOG) 100 UNIT/ML injection, SLIDING SCALE, Disp: , Rfl:     Lantus SoloStar 100 UNIT/ML injection pen, INJECT 5 UNITS SUBCUTANEOUSLY EVERY EVENING AND TITRATE UP TO MAX 20 UNITS PER DAY, Disp: , Rfl:     levothyroxine (SYNTHROID, LEVOTHROID) 50 MCG tablet, Take 1 tablet by mouth Daily., Disp: , Rfl:     Magnesium Oxide (MAG-OXIDE PO), Take 1,200 mg by mouth., Disp: , Rfl:     memantine (NAMENDA XR) 28 MG capsule sustained-release 24 hr extended release capsule, Take 1 capsule by mouth once daily, Disp: 90 capsule, Rfl: 3    metoprolol succinate XL (TOPROL-XL) 25 MG 24 hr tablet, TAKE 1/2 (ONE-HALF) TABLET BY MOUTH EVERY DAY AT BEDTIME, Disp: , Rfl:     Morphine (MS CONTIN) 60 MG 12 hr tablet, Take 1 tablet by mouth 2 (Two) Times a Day. Taking 60 mg in the Am and 30 mg in the evening., Disp: , Rfl:     morphine 2 mg/mL, by Intrathecal route Continuous., Disp: 20 mL, Rfl: 0    Movantik 25 MG tablet, Take 1 tablet by mouth Daily., Disp: , Rfl:     Multiple Vitamin (MULTIVITAMIN) tablet, Take 1 tablet by mouth Daily. HOLD PRIOR TO SURGERY, Disp: , Rfl:     mycophenolate (CELLCEPT) 250 MG capsule, Take 3 capsules by mouth.,  "Disp: , Rfl:     naloxone (NARCAN) 4 MG/0.1ML nasal spray, Call 911. Don't prime. [place] in 1 nostril for overdose. Repeat in 2-3 minutes in other nostril if no or minimal breathing/responsiveness., Disp: 2 each, Rfl: 0    omeprazole (priLOSEC) 40 MG capsule, Take 1 capsule by mouth Every Morning., Disp: , Rfl:     ondansetron (ZOFRAN) 8 MG tablet, As Needed., Disp: , Rfl:     rivastigmine (EXELON) 6 MG capsule, Take 1 capsule by mouth twice daily, Disp: 180 capsule, Rfl: 3    sennosides-docusate (PERICOLACE) 8.6-50 MG per tablet, Take 2 tablets by mouth Daily., Disp: , Rfl:     tacrolimus (PROGRAF) 1 MG capsule, Take 1 capsule by mouth., Disp: , Rfl:     tamsulosin (FLOMAX) 0.4 MG capsule 24 hr capsule, Take 1 capsule by mouth Daily. TAKES @ 1200 NOON, Disp: , Rfl:     Testosterone Cypionate (DEPOTESTOTERONE CYPIONATE) 200 MG/ML injection, Inject 0.5 mL into the appropriate muscle as directed by prescriber 1 (One) Time Per Week., Disp: , Rfl:     ziconotide (PF) 25 mcg/mL, by Intrathecal route Continuous., Disp: 20 mL, Rfl: 0    HYDROcodone-acetaminophen (NORCO) 5-325 MG per tablet, Take 1 tablet by mouth 2 (Two) Times a Day., Disp: 14 tablet, Rfl: 0    spironolactone (ALDACTONE) 25 MG tablet, Take 1 tablet by mouth Daily., Disp: , Rfl:     ALLERGIES  Coreg [carvedilol], Singulair [montelukast sodium], and Theophyllines    VITALS  Vitals:    08/27/24 0908   BP: 130/78   BP Location: Left arm   Patient Position: Sitting   Cuff Size: Adult   Weight: 65.6 kg (144 lb 9.6 oz)   Height: 180.3 cm (70.98\")       PHYSICAL EXAM  Debilities/Disabilities Identified: None  Emotional Behavior: Appropriate  Wt Readings from Last 3 Encounters:   08/27/24 65.6 kg (144 lb 9.6 oz)   08/27/24 65.6 kg (144 lb 9.6 oz)   08/15/24 64 kg (141 lb)     Ht Readings from Last 1 Encounters:   08/27/24 180.3 cm (70.98\")     Body mass index is 20.18 kg/m².  Physical Exam  Constitutional:       General: He is not in acute distress.     Appearance: " Normal appearance. He is not ill-appearing.   HENT:      Head: Normocephalic and atraumatic.      Mouth/Throat:      Mouth: Mucous membranes are moist.      Pharynx: No posterior oropharyngeal erythema.   Eyes:      General: No scleral icterus.  Cardiovascular:      Rate and Rhythm: Normal rate and regular rhythm.      Heart sounds: Normal heart sounds.   Pulmonary:      Effort: Pulmonary effort is normal.      Breath sounds: Normal breath sounds.   Abdominal:      General: Abdomen is flat. Bowel sounds are normal. There is no distension.      Palpations: Abdomen is soft. There is no mass.      Tenderness: There is abdominal tenderness in the epigastric area, periumbilical area and left upper quadrant. There is no guarding or rebound. Negative signs include Pacheco's sign.      Hernia: No hernia is present.   Musculoskeletal:      Cervical back: Neck supple.   Skin:     General: Skin is warm.      Capillary Refill: Capillary refill takes less than 2 seconds.   Neurological:      General: No focal deficit present.      Mental Status: He is alert and oriented to person, place, and time.   Psychiatric:         Mood and Affect: Mood normal.         Behavior: Behavior normal.         Thought Content: Thought content normal.         Judgment: Judgment normal.         CLINICAL DATA REVIEWED   reviewed previous lab results and integrated with today's visit, reviewed notes from other physicians and/or last GI encounter, reviewed previous endoscopy results and available photos, reviewed surgical pathology results from previous biopsies    ASSESSMENT  There are no diagnoses linked to this encounter.      PLAN    Continue movantik, add miralax every day and will re-eval if that is enough to prevent dumping    No follow-ups on file.    I have discussed the above plan with the patient.  They verbalize understanding and are in agreement with the plan.  They have been advised to contact the office for any questions, concerns, or  changes related to their health.

## 2024-08-27 NOTE — PROGRESS NOTES
CHIEF COMPLAINT  Back pain    Subjective   Saurav Cotto . is a 79 y.o. male  who presents for follow-up.  He has a history of chronic back and joint pain. He presents today of a IT pump refill.    Today pain is 5/10VAS in severity. Pain is located in his low back. Describes this pain as a nearly continuous ache.     He manages his pain with the use of IT Prialt infusing at a rate of 1.512 mcg/day (1.411mcg/day continuous rate with scheduled bolus of 0.1mcg dose over 30 minutes at 5:30am) and MS Contin 60mg BID that was previously prescribed by his PCP.  His PCP is retiring and his new provider is not willing to manage his morphine.  He presents today to have his IT Prialt replaced with IT Morphine.  This was discussed with Dr. Brian following his last office visit.    History of severe gastroparesis.  He was admitted to the hospital from 8/5/2024 to 8/9/2024 due to abdominal pain and vomiting.  He is considering a gastric stimulator.  He has lost 26 pounds over the last 3 months.     Surgical History:  7/12/24 - Left total shoulder replacement - Dr. Brito Graves     Back Pain  This is a chronic problem. The current episode started more than 1 year ago. The problem occurs constantly. The problem is unchanged. The pain is present in the lumbar spine. The quality of the pain is described as aching. The pain does not radiate. The pain is at a severity of 5/10. The pain is moderate. The symptoms are aggravated by standing, sitting, position, bending and twisting. Associated symptoms include weakness. Pertinent negatives include no abdominal pain, chest pain, dysuria, fever, headaches or numbness. He has tried heat, ice, analgesics and bed rest (IT Prialt, MS Contin) for the symptoms. The treatment provided mild relief.      PEG Assessment   What number best describes your pain on average in the past week?5  What number best describes how, during the past week, pain has interfered with your enjoyment of  life?10  What number best describes how, during the past week, pain has interfered with your general activity?  10    Review of Pertinent Medical Data ---  Reviewed office note from ABDULKADIR Rowe from 8/15/2024.  Patient presented to the office for I T pump refill.  IT pump was infusing Prialt at a total daily dose of 1.512 mcg/day.  Patient was also prescribed oral morphine by his PCP and has not been able to successfully wean off of this medication.  Unfortunately his PCP is retiring and the new provider is not willing to prescribe this medication.  Patient would like for us to help with this if we can.  Marichuy discussed plan of care with Dr. Brian and the plan was to convert to intrathecal morphine rather than continue the oral morphine.  This is also the more appropriate option given that patient has gastroparesis.  Plan will be to start morphine 2 mg/mL at a rate of 0.25 mg/day.  Plan will be to titrate up if needed.            The following portions of the patient's history were reviewed and updated as appropriate: allergies, current medications, past family history, past medical history, past social history, past surgical history, and problem list.    Review of Systems   Constitutional:  Positive for fatigue. Negative for activity change, chills and fever.   HENT:  Negative for congestion.    Eyes:  Negative for visual disturbance.   Respiratory:  Negative for chest tightness and shortness of breath.    Cardiovascular:  Negative for chest pain.   Gastrointestinal:  Positive for constipation and diarrhea. Negative for abdominal pain.   Genitourinary:  Negative for difficulty urinating and dysuria.   Musculoskeletal:  Positive for back pain.   Neurological:  Positive for weakness. Negative for dizziness, light-headedness, numbness and headaches.   Psychiatric/Behavioral:  Negative for agitation and sleep disturbance. The patient is not nervous/anxious.      I have reviewed and confirmed the accuracy  "of the ROS as documented by the MA/LPN/RN ABDULKADIR Solorzano    Vitals:    08/27/24 0833   BP: 136/78   Pulse: 84   Temp: 98 °F (36.7 °C)   SpO2: 96%   Weight: 65.6 kg (144 lb 9.6 oz)   Height: 180.3 cm (71\")   PainSc:   5   PainLoc: Back     Objective   Physical Exam  Constitutional:       Appearance: Normal appearance.   HENT:      Head: Normocephalic.   Cardiovascular:      Rate and Rhythm: Normal rate and regular rhythm.   Pulmonary:      Effort: Pulmonary effort is normal.      Breath sounds: Normal breath sounds.   Musculoskeletal:      Cervical back: Normal range of motion.      Lumbar back: Tenderness and bony tenderness present. Decreased range of motion.   Skin:     General: Skin is warm and dry.      Capillary Refill: Capillary refill takes less than 2 seconds.   Neurological:      General: No focal deficit present.      Mental Status: He is alert and oriented to person, place, and time.   Psychiatric:         Mood and Affect: Mood normal.         Behavior: Behavior normal.         Thought Content: Thought content normal.         Cognition and Memory: Cognition normal.       Assessment & Plan   Diagnoses and all orders for this visit:    1. High risk medication use (Primary)  -     naloxone (NARCAN) 4 MG/0.1ML nasal spray; Call 911. Don't prime. Napoleon in 1 nostril for overdose. Repeat in 2-3 minutes in other nostril if no or minimal breathing/responsiveness.  Dispense: 2 each; Refill: 0    2. Other chronic pain    3. Presence of intrathecal pump    4. Spinal stenosis of lumbar region, unspecified whether neurogenic claudication present  -     HYDROcodone-acetaminophen (NORCO) 5-325 MG per tablet; Take 1 tablet by mouth 2 (Two) Times a Day.  Dispense: 14 tablet; Refill: 0    5. Lumbar radiculopathy  -     HYDROcodone-acetaminophen (NORCO) 5-325 MG per tablet; Take 1 tablet by mouth 2 (Two) Times a Day.  Dispense: 14 tablet; Refill: 0      Intrathecal pump was interrogated in office today. Results are " in chart.    The patient is currently at a dose of 1.411mcg/day continuous of Prialt with addition of 0.1mcg scheduled at 5:30am. Total daily dose of is 1.512mcg/day.    Under sterile technique, the pump was accessed using a quickhuddle proprietary refill kit. The volume was withdrawn from the pump. The expected residual volume of the pump was 19.3 ml. The actual residual volume of the pump was 19 ml.    The pump was then refilled with 20 ml of Morphine at a concentration of 2 mg/ml. The pump was set to continue 0.25mg/day. A 24 hour bridge bolus of 1.511mcg of Prialt was given.     Patient tolerated procedure well. Minimal bleeding was noted. Pump site remains intact with no evidence of erythema or drainage.    Patient will return for pump refill when due or sooner if needed.     CADY is 44 months or March 2028    Pump refill date is on/before 1/21/25.    Saurav Cotto Sr. reports a pain score of 5.  Given his pain assessment as noted, treatment options were discussed and the following options were decided upon as a follow-up plan to address the patient's pain: continuation of current treatment plan for pain and prescription for opiod analgesics.    --- D/C'd intrathecal Prialt and started IT Morphine 2mg/ml at a rate of 0.25mg/day. Will titrate upward as necessary  --- Narcan prescription sent to pharmacy. Discussed S/S of respiratory depression with patient and family.  --- Discussed plan of care with Dr. Brian. Patient to take MS Contin 60mg tonight and transition to Hydrocodone 5mg BID x 7 days to avoid withdrawal. Wife is present andd verbalizing understanding of this plan.   --- UDS at next office visit  --- Follow-up 2 weeks to assess medication change effectiveness      WILDER NORIEGA  As part of the patient's treatment plan, I am prescribing controlled substances. The patient has been made aware of appropriate use of such medications, including potential risk of somnolence, limited ability to drive and/or  work safely, and the potential for dependence or overdose. It has also been made clear that these medications are for use by this patient only, without concomitant use of alcohol or other substances unless prescribed.     Patient has completed prescribing agreement detailing terms of continued prescribing of controlled substances, including monitoring WILDER reports, urine drug screening, and pill counts if necessary. The patient is aware that inappropriate use will results in cessation of prescribing such medications.    As the clinician, I personally reviewed the WILDER from 8/27/24 while the patient was in the office today.    History and physical exam exhibit continued safe and appropriate use of controlled substances.    Dictated utilizing Dragon dictation.

## 2024-08-30 ENCOUNTER — TELEPHONE (OUTPATIENT)
Dept: PAIN MEDICINE | Facility: CLINIC | Age: 79
End: 2024-08-30
Payer: MEDICARE

## 2024-08-30 DIAGNOSIS — M48.061 SPINAL STENOSIS OF LUMBAR REGION, UNSPECIFIED WHETHER NEUROGENIC CLAUDICATION PRESENT: ICD-10-CM

## 2024-08-30 DIAGNOSIS — M54.16 LUMBAR RADICULOPATHY: ICD-10-CM

## 2024-08-30 RX ORDER — HYDROCODONE BITARTRATE AND ACETAMINOPHEN 10; 325 MG/1; MG/1
TABLET ORAL
Qty: 49 TABLET | Refills: 0 | Status: SHIPPED | OUTPATIENT
Start: 2024-08-30

## 2024-08-30 NOTE — TELEPHONE ENCOUNTER
Ms. Cotto called and stated that she believes he is having withdrawal symptoms. He is aching, has diarrhea, and is depressed. He is taking Norco bid and tylenol in between. She would like to know if there is anything else that can be done. Please advise.

## 2024-08-30 NOTE — TELEPHONE ENCOUNTER
Called patients wife. She had questions in regards to overdose symptoms. I reviewed overdose symptoms with her and advised her to use narcan if she noticed any overdose symptoms and then call 911. I also reviewed new medication regimen with her again.

## 2024-08-30 NOTE — TELEPHONE ENCOUNTER
.        Hub staff attempted to follow warm transfer process and was unsuccessful     Caller: EWELINA MAYO    Relationship to patient: WIFE ON  [employer]     Best call back number: [phone]    Patient is needing: REQUEST TO SPEAK WITH MAYITO OR VALERIE PANCHAL

## 2024-08-30 NOTE — TELEPHONE ENCOUNTER
I called Dr. Brian per ABDULKADIR Ware request. Per Dr. Brian patient should be prescribed Hydrocodone -Acetaminophen  mg every 6 hours prn for 7 days and then hydrocodone- acetaminophen every 8 hours prn for 7 days. The patient will be re-evaluated at his next appointment in 2 weeks where his pain pump should be increased. Message relayed to ABDULKADIR Reid

## 2024-09-10 ENCOUNTER — OFFICE VISIT (OUTPATIENT)
Age: 79
End: 2024-09-10
Payer: MEDICARE

## 2024-09-10 VITALS
SYSTOLIC BLOOD PRESSURE: 106 MMHG | TEMPERATURE: 98 F | HEIGHT: 71 IN | BODY MASS INDEX: 20.17 KG/M2 | DIASTOLIC BLOOD PRESSURE: 72 MMHG

## 2024-09-10 DIAGNOSIS — G89.29 OTHER CHRONIC PAIN: ICD-10-CM

## 2024-09-10 DIAGNOSIS — Z97.8 PRESENCE OF INTRATHECAL PUMP: ICD-10-CM

## 2024-09-10 DIAGNOSIS — M48.061 SPINAL STENOSIS OF LUMBAR REGION, UNSPECIFIED WHETHER NEUROGENIC CLAUDICATION PRESENT: Primary | ICD-10-CM

## 2024-09-10 DIAGNOSIS — M54.16 LUMBAR RADICULOPATHY: ICD-10-CM

## 2024-09-10 DIAGNOSIS — Z79.899 HIGH RISK MEDICATION USE: ICD-10-CM

## 2024-09-10 PROCEDURE — 99213 OFFICE O/P EST LOW 20 MIN: CPT

## 2024-09-10 PROCEDURE — 1159F MED LIST DOCD IN RCRD: CPT

## 2024-09-10 PROCEDURE — 1160F RVW MEDS BY RX/DR IN RCRD: CPT

## 2024-09-10 PROCEDURE — 1125F AMNT PAIN NOTED PAIN PRSNT: CPT

## 2024-09-10 NOTE — PROGRESS NOTES
CHIEF COMPLAINT  Back pain    Subjective   Saurav Cotto Sr. is a 79 y.o. male  who presents for follow-up.  He has a history of chronic low back pain. He reports that his pain has remained consistent since his last office visit.     Today pain is 6/10VAS in severity. Pain is located in his low back. Describes this pain as a nearly continuous ache. IT pump was filled with Morphine at his last office visit. He is unsure if this change has been beneficial or not. He felt that his pain was better managed with oral Morphine (60mg BID). He was prescribed Hydrocodone to help with pain and avoid withdrawal from oral Morphine until IT pump medication was being administered. He states Hydrocodone was giving him nightmares.     History of severe gastroparesis.  He is considering a gastric stimulator.      Surgical History:  7/12/24 - Left total shoulder replacement - Dr. Daryl Booth     Back Pain  This is a chronic problem. The current episode started more than 1 year ago. The problem occurs constantly. The problem is unchanged. The pain is present in the lumbar spine. The quality of the pain is described as aching. The pain does not radiate. The pain is at a severity of 6/10. The pain is moderate. The symptoms are aggravated by standing, sitting, position, bending and twisting. Associated symptoms include headaches and weakness. Pertinent negatives include no abdominal pain, chest pain, dysuria, fever or numbness. He has tried heat, ice, analgesics and bed rest (IT Prialt, MS Contin) for the symptoms. The treatment provided mild relief.     PEG Assessment   What number best describes your pain on average in the past week?5  What number best describes how, during the past week, pain has interfered with your enjoyment of life?8  What number best describes how, during the past week, pain has interfered with your general activity?  8    Review of Pertinent Medical Data ---  \      The following portions of the patient's  "history were reviewed and updated as appropriate: allergies, current medications, past family history, past medical history, past social history, past surgical history, and problem list.    Review of Systems   Constitutional:  Positive for fatigue. Negative for activity change, chills and fever.   HENT:  Positive for congestion.    Eyes:  Negative for visual disturbance.   Respiratory:  Positive for shortness of breath. Negative for chest tightness.    Cardiovascular:  Negative for chest pain.   Gastrointestinal:  Negative for abdominal pain, constipation and diarrhea.   Genitourinary:  Negative for difficulty urinating and dysuria.   Musculoskeletal:  Positive for back pain.   Neurological:  Positive for weakness and headaches. Negative for dizziness, light-headedness and numbness.   Psychiatric/Behavioral:  Negative for agitation and sleep disturbance. The patient is not nervous/anxious.      I have reviewed and confirmed the accuracy of the ROS as documented by the MA/LPN/RN ABDULKADIR Solorzano    Vitals:    09/10/24 1035   BP: 106/72   Temp: 98 °F (36.7 °C)   Height: 180.3 cm (71\")   PainSc:   6   PainLoc: Back     Objective   Physical Exam  Constitutional:       Appearance: Normal appearance.   HENT:      Head: Normocephalic.   Cardiovascular:      Rate and Rhythm: Normal rate and regular rhythm.   Pulmonary:      Effort: Pulmonary effort is normal.      Breath sounds: Normal breath sounds.   Musculoskeletal:      Cervical back: Normal range of motion.      Lumbar back: Tenderness and bony tenderness present. Decreased range of motion.   Skin:     General: Skin is warm and dry.      Capillary Refill: Capillary refill takes less than 2 seconds.   Neurological:      General: No focal deficit present.      Mental Status: He is alert and oriented to person, place, and time.   Psychiatric:         Mood and Affect: Mood normal.         Behavior: Behavior normal.         Thought Content: Thought content normal.    "      Cognition and Memory: Cognition normal.       Assessment & Plan   Diagnoses and all orders for this visit:    1. Spinal stenosis of lumbar region, unspecified whether neurogenic claudication present (Primary)    2. Presence of intrathecal pump    3. Lumbar radiculopathy    4. Other chronic pain    5. High risk medication use    Intrathecal pump was interrogated in office today. Results are in chart.     The patient is currently at a dose of 0.25mg/ml of Morphine 2mg/ml per day. Pump was not refilled during this visit.      The pump was increased to 0.35mg/day of Morphine 2mg/ml per day. Pump site remains intact with no evidence of erythema or drainage.     Patient will return in 2 weeks to assess medication increase effectiveness.      CADY is 44 months or April 2028     Pump refill date is on/before 12/14/24    Saurav Cotto . reports a pain score of 6.  Given his pain assessment as noted, treatment options were discussed and the following options were decided upon as a follow-up plan to address the patient's pain: continuation of current treatment plan for pain and prescription for opiod analgesics.    --- Discussed plan of care with Dr. Brian. IT Morphine 2mg/ml was increased to a rate of 0.35mg/day. Will continue to titrate upward as necessary.   --- UDS at next office visit.   --- Follow-up 2 weeks to assess medication increase effectiveness       WILDER NORIEGA  As part of the patient's treatment plan, I am prescribing controlled substances. The patient has been made aware of appropriate use of such medications, including potential risk of somnolence, limited ability to drive and/or work safely, and the potential for dependence or overdose. It has also been made clear that these medications are for use by this patient only, without concomitant use of alcohol or other substances unless prescribed.     Patient has completed prescribing agreement detailing terms of continued prescribing of controlled  substances, including monitoring WILDER reports, urine drug screening, and pill counts if necessary. The patient is aware that inappropriate use will results in cessation of prescribing such medications.    As the clinician, I personally reviewed the WILDER from 9/10/24 while the patient was in the office today.    History and physical exam exhibit continued safe and appropriate use of controlled substances.    Dictated utilizing Dragon dictation.

## 2024-09-24 ENCOUNTER — OFFICE VISIT (OUTPATIENT)
Age: 79
End: 2024-09-24
Payer: MEDICARE

## 2024-09-24 VITALS
HEIGHT: 71 IN | OXYGEN SATURATION: 93 % | SYSTOLIC BLOOD PRESSURE: 128 MMHG | DIASTOLIC BLOOD PRESSURE: 82 MMHG | TEMPERATURE: 98 F | HEART RATE: 55 BPM | BODY MASS INDEX: 19.43 KG/M2 | WEIGHT: 138.8 LBS

## 2024-09-24 DIAGNOSIS — G89.29 OTHER CHRONIC PAIN: ICD-10-CM

## 2024-09-24 DIAGNOSIS — Z97.8 PRESENCE OF INTRATHECAL PUMP: ICD-10-CM

## 2024-09-24 DIAGNOSIS — M48.061 SPINAL STENOSIS OF LUMBAR REGION, UNSPECIFIED WHETHER NEUROGENIC CLAUDICATION PRESENT: Primary | ICD-10-CM

## 2024-09-24 DIAGNOSIS — M54.16 LUMBAR RADICULOPATHY: ICD-10-CM

## 2024-09-24 DIAGNOSIS — Z79.899 HIGH RISK MEDICATION USE: ICD-10-CM

## 2024-09-24 PROCEDURE — 1125F AMNT PAIN NOTED PAIN PRSNT: CPT

## 2024-09-24 PROCEDURE — 1159F MED LIST DOCD IN RCRD: CPT

## 2024-09-24 PROCEDURE — 99213 OFFICE O/P EST LOW 20 MIN: CPT

## 2024-09-24 PROCEDURE — 1160F RVW MEDS BY RX/DR IN RCRD: CPT

## 2024-10-15 ENCOUNTER — OFFICE VISIT (OUTPATIENT)
Age: 79
End: 2024-10-15
Payer: MEDICARE

## 2024-10-15 VITALS
TEMPERATURE: 98 F | BODY MASS INDEX: 20.1 KG/M2 | HEART RATE: 83 BPM | HEIGHT: 71 IN | SYSTOLIC BLOOD PRESSURE: 126 MMHG | OXYGEN SATURATION: 96 % | WEIGHT: 143.6 LBS | DIASTOLIC BLOOD PRESSURE: 74 MMHG

## 2024-10-15 DIAGNOSIS — G89.29 OTHER CHRONIC PAIN: ICD-10-CM

## 2024-10-15 DIAGNOSIS — M48.061 SPINAL STENOSIS OF LUMBAR REGION, UNSPECIFIED WHETHER NEUROGENIC CLAUDICATION PRESENT: Primary | ICD-10-CM

## 2024-10-15 DIAGNOSIS — Z97.8 PRESENCE OF INTRATHECAL PUMP: ICD-10-CM

## 2024-10-15 DIAGNOSIS — Z79.899 HIGH RISK MEDICATION USE: ICD-10-CM

## 2024-10-15 DIAGNOSIS — M54.16 LUMBAR RADICULOPATHY: ICD-10-CM

## 2024-10-15 PROCEDURE — 1160F RVW MEDS BY RX/DR IN RCRD: CPT

## 2024-10-15 PROCEDURE — 99213 OFFICE O/P EST LOW 20 MIN: CPT

## 2024-10-15 PROCEDURE — 1125F AMNT PAIN NOTED PAIN PRSNT: CPT

## 2024-10-15 PROCEDURE — 1159F MED LIST DOCD IN RCRD: CPT

## 2024-10-15 RX ORDER — FINASTERIDE 5 MG/1
5 TABLET, FILM COATED ORAL EVERY EVENING
COMMUNITY
Start: 2024-09-18

## 2024-10-15 NOTE — PROGRESS NOTES
CHIEF COMPLAINT  Back pain    Subjective   Saurav Cotto Sr. is a 79 y.o. male  who presents for follow-up.  He has a history of chronic low back pain. Her reports that his pain has worsened since his last office visit.    Today pain is 8/10VAS in severity. Pain is located in his low back. Describes this pain as a nearly continuous ache. He continues with IT Morphine and OTC Tylenol PRN for pain. He feels that his pain was better managed with oral Morphine (60mg BID). He is unable to sleep in his bed due to increased pain when lying flat.      History of severe gastroparesis.  He is considering a gastric stimulator.       Hydrocodone caused nightmares     Surgical History:  7/12/24 - Left total shoulder replacement - Dr. Daryl Booth     Back Pain  This is a chronic problem. The current episode started more than 1 year ago. The problem occurs constantly. The problem has been worse since onset. The pain is present in the lumbar spine. The quality of the pain is described as aching. The pain does not radiate. The pain is at a severity of 8/10. The pain is moderate. The symptoms are aggravated by standing, sitting, position, bending and twisting. Associated symptoms include headaches and weakness. Pertinent negatives include no abdominal pain, chest pain, dysuria, fever or numbness. He has tried heat, ice, analgesics and bed rest (IT Prialt, MS Contin) for the symptoms. The treatment provided mild relief.     PEG Assessment   What number best describes your pain on average in the past week?7  What number best describes how, during the past week, pain has interfered with your enjoyment of life?10  What number best describes how, during the past week, pain has interfered with your general activity?  10    Review of Pertinent Medical Data ---        The following portions of the patient's history were reviewed and updated as appropriate: allergies, current medications, past family history, past medical history, past  "social history, past surgical history, and problem list.    Review of Systems   Constitutional:  Positive for activity change (decreased) and fatigue. Negative for chills and fever.   HENT:  Negative for congestion.    Eyes:  Negative for visual disturbance.   Respiratory:  Negative for chest tightness and shortness of breath.    Cardiovascular:  Negative for chest pain.   Gastrointestinal:  Positive for constipation and diarrhea. Negative for abdominal pain.   Genitourinary:  Negative for difficulty urinating and dysuria.   Musculoskeletal:  Positive for back pain.   Neurological:  Positive for weakness and headaches. Negative for dizziness, light-headedness and numbness.   Psychiatric/Behavioral:  Negative for agitation and sleep disturbance. The patient is not nervous/anxious.      I have reviewed and confirmed the accuracy of the ROS as documented by the MA/RUYN/RN ABDULKADIR Solorzano    Vitals:    10/15/24 0841   BP: 126/74   Pulse: 83   Temp: 98 °F (36.7 °C)   SpO2: 96%   Weight: 65.1 kg (143 lb 9.6 oz)   Height: 180.3 cm (71\")   PainSc:   8   PainLoc: Back     Objective   Physical Exam  Constitutional:       Appearance: Normal appearance.   HENT:      Head: Normocephalic.   Cardiovascular:      Rate and Rhythm: Normal rate and regular rhythm.   Pulmonary:      Effort: Pulmonary effort is normal.      Breath sounds: Normal breath sounds.   Musculoskeletal:      Cervical back: Normal range of motion.      Lumbar back: Tenderness and bony tenderness present. Decreased range of motion.   Skin:     General: Skin is warm and dry.      Capillary Refill: Capillary refill takes less than 2 seconds.   Neurological:      General: No focal deficit present.      Mental Status: He is alert and oriented to person, place, and time.   Psychiatric:         Mood and Affect: Mood normal.         Behavior: Behavior normal.         Thought Content: Thought content normal.         Cognition and Memory: Cognition normal. "       Assessment & Plan   Diagnoses and all orders for this visit:    1. Spinal stenosis of lumbar region, unspecified whether neurogenic claudication present (Primary)    2. Presence of intrathecal pump    3. Lumbar radiculopathy    4. Other chronic pain    5. High risk medication use      Intrathecal pump was interrogated in office today. Results are in chart.     The patient is currently at a dose of 0.35mg/ml of Morphine 2mg/ml per day. Pump was not refilled during this visit.      The pump was increased to 0.6mg/day of Morphine 2mg/ml per day. Pump site remains intact with no evidence of erythema or drainage.     Patient will return in 2 weeks to assess medication increase effectiveness.      CADY is 43 months or March 2028     Pump refill date is on/before 11/19/24     Saurav Cotto Sr. reports a pain score of 8.  Given his pain assessment as noted, treatment options were discussed and the following options were decided upon as a follow-up plan to address the patient's pain: continuation of current treatment plan for pain.    --- Oral drug screen in office today as part of monitoring requirements for controlled substances.  This specimen will be sent to [facility] laboratory for confirmation.     --- Discussed plan of care with Dr. Brian. IT Morphine 2mg/ml was increased to a rate of 0.6mg/day. Will continue to titrate upward as necessary.   --- Follow-up 2 weeks to assess medication increase and for IT pump refill.     WILDER NORIEGA  As part of the patient's treatment plan, I am prescribing controlled substances. The patient has been made aware of appropriate use of such medications, including potential risk of somnolence, limited ability to drive and/or work safely, and the potential for dependence or overdose. It has also been made clear that these medications are for use by this patient only, without concomitant use of alcohol or other substances unless prescribed.     Patient has completed prescribing  agreement detailing terms of continued prescribing of controlled substances, including monitoring WILDER reports, urine drug screening, and pill counts if necessary. The patient is aware that inappropriate use will results in cessation of prescribing such medications.    As the clinician, I personally reviewed the WILDER from 10/15/24 while the patient was in the office today.    History and physical exam exhibit continued safe and appropriate use of controlled substances.    Dictated utilizing Dragon dictation.

## 2024-10-29 ENCOUNTER — OFFICE VISIT (OUTPATIENT)
Age: 79
End: 2024-10-29
Payer: MEDICARE

## 2024-10-29 VITALS
TEMPERATURE: 98.2 F | HEART RATE: 72 BPM | DIASTOLIC BLOOD PRESSURE: 82 MMHG | WEIGHT: 140.6 LBS | OXYGEN SATURATION: 93 % | BODY MASS INDEX: 19.69 KG/M2 | SYSTOLIC BLOOD PRESSURE: 136 MMHG | HEIGHT: 71 IN

## 2024-10-29 DIAGNOSIS — M48.061 SPINAL STENOSIS OF LUMBAR REGION, UNSPECIFIED WHETHER NEUROGENIC CLAUDICATION PRESENT: Primary | ICD-10-CM

## 2024-10-29 DIAGNOSIS — M54.16 LUMBAR RADICULOPATHY: ICD-10-CM

## 2024-10-29 DIAGNOSIS — G89.29 OTHER CHRONIC PAIN: ICD-10-CM

## 2024-10-29 DIAGNOSIS — Z97.8 PRESENCE OF INTRATHECAL PUMP: ICD-10-CM

## 2024-10-29 DIAGNOSIS — Z79.899 HIGH RISK MEDICATION USE: ICD-10-CM

## 2024-10-29 LAB
POC AMPHETAMINES: NEGATIVE
POC BARBITURATES: NEGATIVE
POC BENZODIAZEPHINES: NEGATIVE
POC COCAINE: NEGATIVE
POC METHADONE: NEGATIVE
POC METHAMPHETAMINE SCREEN URINE: NEGATIVE
POC OPIATES: POSITIVE
POC OXYCODONE: NEGATIVE
POC PHENCYCLIDINE: NEGATIVE
POC PROPOXYPHENE: NEGATIVE
POC THC: NEGATIVE
POC TRICYCLIC ANTIDEPRESSANTS: NEGATIVE

## 2024-10-29 PROCEDURE — 99214 OFFICE O/P EST MOD 30 MIN: CPT

## 2024-10-29 PROCEDURE — 1159F MED LIST DOCD IN RCRD: CPT

## 2024-10-29 PROCEDURE — 80305 DRUG TEST PRSMV DIR OPT OBS: CPT

## 2024-10-29 PROCEDURE — 1125F AMNT PAIN NOTED PAIN PRSNT: CPT

## 2024-10-29 PROCEDURE — 1160F RVW MEDS BY RX/DR IN RCRD: CPT

## 2024-10-29 PROCEDURE — 62369 ANAL SP INF PMP W/REPRG&FILL: CPT

## 2024-10-29 RX ORDER — PSEUDOEPHEDRINE HCL 30 MG
100 TABLET ORAL DAILY PRN
COMMUNITY

## 2024-10-29 NOTE — PROGRESS NOTES
CHIEF COMPLAINT  Back pain    Subjective   Saurav Cotto Sr. is a 79 y.o. male  who presents for follow-up.  He has a history of chronic back pain. He reports that his pain has slightly improved since his last office visit.    Today pain is 7/10VAS in severity. Pain is located in his low back. Describes this pain as a nearly continuous ache. He continues with IT Morphine and OTC Tylenol PRN for pain. He feels that his pain was better managed with oral Morphine (60mg BID). He is unable to sleep in his bed due to increased pain when lying flat.     He is receiving home health PT twice a week and his wife feels that this is making him stronger.     History of severe gastroparesis.  He is considering a gastric stimulator.       Hydrocodone caused nightmares     Surgical History:  7/12/24 - Left total shoulder replacement - Dr. Daryl Booth     Back Pain  This is a chronic problem. The current episode started more than 1 year ago. The problem occurs constantly. The problem is unchanged. The pain is present in the lumbar spine. The quality of the pain is described as aching. The pain does not radiate. The pain is at a severity of 7/10. The pain is moderate. The symptoms are aggravated by standing, sitting, position, bending and twisting. Associated symptoms include headaches and weakness. Pertinent negatives include no abdominal pain, chest pain, dysuria, fever or numbness. He has tried heat, ice, analgesics and bed rest (IT Prialt, MS Contin) for the symptoms. The treatment provided mild relief.     PEG Assessment   What number best describes your pain on average in the past week?7  What number best describes how, during the past week, pain has interfered with your enjoyment of life?7  What number best describes how, during the past week, pain has interfered with your general activity?  10    Review of Pertinent Medical Data ---      The following portions of the patient's history were reviewed and updated as  "appropriate: allergies, current medications, past family history, past medical history, past social history, past surgical history, and problem list.    Review of Systems   Constitutional:  Positive for fatigue. Negative for activity change, chills and fever.   HENT:  Negative for congestion.    Eyes:  Negative for visual disturbance.   Respiratory:  Negative for chest tightness and shortness of breath.    Cardiovascular:  Negative for chest pain.   Gastrointestinal:  Positive for constipation and diarrhea. Negative for abdominal pain.   Genitourinary:  Negative for difficulty urinating and dysuria.   Musculoskeletal:  Positive for back pain.   Neurological:  Positive for weakness and headaches. Negative for dizziness, light-headedness and numbness.   Psychiatric/Behavioral:  Negative for agitation, self-injury, sleep disturbance and suicidal ideas. The patient is not nervous/anxious.      I have reviewed and confirmed the accuracy of the ROS as documented by the MA/LPN/RN ABDULKADIR Solorzano    Vitals:    10/29/24 1103   BP: 136/82   Pulse: 72   Temp: 98.2 °F (36.8 °C)   SpO2: 93%   Weight: 63.8 kg (140 lb 9.6 oz)   Height: 180.3 cm (71\")   PainSc:   7   PainLoc: Back     Objective   Physical Exam  Constitutional:       Appearance: Normal appearance.   HENT:      Head: Normocephalic.   Cardiovascular:      Rate and Rhythm: Normal rate and regular rhythm.   Pulmonary:      Effort: Pulmonary effort is normal.      Breath sounds: Normal breath sounds.   Musculoskeletal:      Cervical back: Normal range of motion.      Lumbar back: Tenderness and bony tenderness present. Decreased range of motion.   Skin:     General: Skin is warm and dry.      Capillary Refill: Capillary refill takes less than 2 seconds.   Neurological:      General: No focal deficit present.      Mental Status: He is alert and oriented to person, place, and time.   Psychiatric:         Mood and Affect: Mood normal.         Behavior: Behavior " normal.         Thought Content: Thought content normal.         Cognition and Memory: Cognition normal.       Assessment & Plan   Diagnoses and all orders for this visit:    1. Spinal stenosis of lumbar region, unspecified whether neurogenic claudication present (Primary)    2. Presence of intrathecal pump    3. Lumbar radiculopathy    4. Other chronic pain    5. High risk medication use      Intrathecal pump was interrogated in office today. Results are in chart.    The patient is currently at a dose of 0.6mg/day of Morphine 2mg/ml.     Under sterile technique, the pump was accessed using a [employer] proprietary refill kit. The volume was withdrawn from the pump. The expected residual volume of the pump was 6.8 ml. The actual residual volume of the pump was 7 ml.    The pump was then refilled with 20 ml of Morphine at a concentration of 2 mg/ml.  The pump was increased to a rate of 0.75mg/day.     Patient tolerated procedure well. Minimal bleeding was noted. Pump site remains intact with no evidence of erythema or drainage.    Patient will return for pump refill when due or sooner if needed.     CADY is 42 months (March 2028)    Pump refill date is 12/16/24.     Saurav Cotto . reports a pain score of 7.  Given his pain assessment as noted, treatment options were discussed and the following options were decided upon as a follow-up plan to address the patient's pain: continuation of current treatment plan for pain and prescription for opiod analgesics.    --- Routine UDS in office today as part of monitoring requirements for controlled substances.  The specimen was viewed and the immunoassay result reviewed and is +OPI.  This specimen will be sent to [facility] for confirmation.     --- Discussed plan of care with Dr. Brian. IT Morphine 2mg/ml was increased to a rate of 0.75mg/day. Will continue to titrate upward as necessary.   --- Follow-up on/before 12/16/24 for IT pump refill.      WILDER  REPORT  As part of the patient's treatment plan, I am prescribing controlled substances. The patient has been made aware of appropriate use of such medications, including potential risk of somnolence, limited ability to drive and/or work safely, and the potential for dependence or overdose. It has also been made clear that these medications are for use by this patient only, without concomitant use of alcohol or other substances unless prescribed.     Patient has completed prescribing agreement detailing terms of continued prescribing of controlled substances, including monitoring WILDER reports, urine drug screening, and pill counts if necessary. The patient is aware that inappropriate use will results in cessation of prescribing such medications.    As the clinician, I personally reviewed the WILDER from 10/29/24 while the patient was in the office today.    History and physical exam exhibit continued safe and appropriate use of controlled substances.    Dictated utilizing Dragon dictation.

## 2024-11-15 DIAGNOSIS — Z86.73 HISTORY OF TIA (TRANSIENT ISCHEMIC ATTACK): ICD-10-CM

## 2024-11-15 RX ORDER — CLOPIDOGREL BISULFATE 75 MG/1
75 TABLET ORAL DAILY
Qty: 90 TABLET | Refills: 0 | Status: SHIPPED | OUTPATIENT
Start: 2024-11-15

## 2024-11-20 ENCOUNTER — OFFICE VISIT (OUTPATIENT)
Dept: PAIN MEDICINE | Facility: CLINIC | Age: 79
End: 2024-11-20
Payer: MEDICARE

## 2024-11-20 VITALS
BODY MASS INDEX: 19.71 KG/M2 | HEART RATE: 60 BPM | DIASTOLIC BLOOD PRESSURE: 84 MMHG | OXYGEN SATURATION: 98 % | WEIGHT: 140.8 LBS | SYSTOLIC BLOOD PRESSURE: 138 MMHG | TEMPERATURE: 95.3 F | HEIGHT: 71 IN

## 2024-11-20 DIAGNOSIS — M54.16 LUMBAR RADICULOPATHY: ICD-10-CM

## 2024-11-20 DIAGNOSIS — M48.062 SPINAL STENOSIS OF LUMBAR REGION WITH NEUROGENIC CLAUDICATION: Primary | ICD-10-CM

## 2024-11-20 DIAGNOSIS — Z97.8 PRESENCE OF INTRATHECAL PUMP: ICD-10-CM

## 2024-11-20 DIAGNOSIS — Z98.890 S/P INSERTION OF INTRATHECAL PUMP: ICD-10-CM

## 2024-11-20 PROCEDURE — 1160F RVW MEDS BY RX/DR IN RCRD: CPT | Performed by: PHYSICIAN ASSISTANT

## 2024-11-20 PROCEDURE — 99214 OFFICE O/P EST MOD 30 MIN: CPT | Performed by: PHYSICIAN ASSISTANT

## 2024-11-20 PROCEDURE — 1125F AMNT PAIN NOTED PAIN PRSNT: CPT | Performed by: PHYSICIAN ASSISTANT

## 2024-11-20 PROCEDURE — 1159F MED LIST DOCD IN RCRD: CPT | Performed by: PHYSICIAN ASSISTANT

## 2024-11-20 NOTE — PROGRESS NOTES
CHIEF COMPLAINT    Back pain. The patient states he does not feel the pain pump is working.     Subjective   Saurav Cotto Sr. is a 79 y.o. male  who presents for follow-up.  He has a history of chronic low back pain.  The patient presented to the office on 10/29/2024 for intrathecal pain pump refill and analysis.  On that visit the pump was increased from 0.6 mg a day to 0.75 mg/day.  The patient tolerated the increase well without adverse effects however he did not feel that it provided any pain relief.  He contacted our office with report that he did not feel the pump was working to control his pain.  Continues to illustrate a continuous aching sensation in a bandlike distribution across the lumbar spine.    Reuebn continues to verbalize that he feels that his pain was better managed when he was receiving intrathecal Prialt and morphine 60 mg twice daily.  Due to his increased pain he is unable to sleep in the bed and his son who is with him today states that he spends majority of the day in his recliner with lumbar support.    Patient is receiving home health PT twice a week in order to help strengthen his lower extremities.    Pain today 8/10 VAS in severity.      Back Pain  Associated symptoms include weakness (generalized). Pertinent negatives include no fever or numbness.        PEG Assessment   What number best describes your pain on average in the past week?8  What number best describes how, during the past week, pain has interfered with your enjoyment of life?10  What number best describes how, during the past week, pain has interfered with your general activity?  10    Review of Pertinent Medical Data ---  No new imaging for today for review    The following portions of the patient's history were reviewed and updated as appropriate: allergies, current medications, past family history, past medical history, past social history, past surgical history, and problem list.    Review of Systems  "  Constitutional:  Negative for chills and fever.   Respiratory:  Negative for cough and shortness of breath.    Gastrointestinal:  Positive for constipation and diarrhea.   Genitourinary:  Negative for difficulty urinating.   Musculoskeletal:  Positive for back pain and gait problem (ambulates with a walker).   Neurological:  Positive for weakness (generalized). Negative for dizziness, light-headedness and numbness.   Psychiatric/Behavioral:  Negative for agitation.      I have reviewed and confirmed the accuracy of the ROS as documented by the MA/LPN/RN KIAH Chacon  Vitals:    11/20/24 1536   BP: 138/84   BP Location: Left arm   Patient Position: Sitting   Pulse: 60   Temp: 95.3 °F (35.2 °C)   TempSrc: Temporal   SpO2: 98%   Weight: 63.9 kg (140 lb 12.8 oz)   Height: 180.3 cm (71\")   PainSc:   8   PainLoc: Back         Objective   Physical Exam  Vitals and nursing note reviewed. Exam conducted with a chaperone present (Son).   Constitutional:       General: He is in acute distress (Of setting appears to be in discomfort).      Appearance: Normal appearance. He is normal weight.   HENT:      Head: Normocephalic.   Neurological:      Mental Status: He is alert and oriented to person, place, and time.      Cranial Nerves: Cranial nerves 2-12 are intact.      Sensory: Sensation is intact.      Gait: Gait abnormal.   Psychiatric:         Mood and Affect: Mood normal.         Behavior: Behavior normal.         Thought Content: Thought content normal.         Judgment: Judgment normal.         Assessment & Plan   Diagnoses and all orders for this visit:    1. Spinal stenosis of lumbar region with neurogenic claudication (Primary)    2. Lumbar radiculopathy    3. S/P insertion of intrathecal pump    4. Presence of intrathecal pump        Saurav Cotto . reports a pain score of 8.  Given his pain assessment as noted, treatment options were discussed and the following options were decided upon as a follow-up plan " to address the patient's pain: continuation of current treatment plan for pain and use of non-medical modalities (ice, heat, stretching and/or behavior modifications).      The patient is currently at a dose of 0.75 mg/day of Morphine 2mg/ml.     I have discussed the patient with Dr. Brian with recommendation to increase intrathecal pump rate to 0.9 mg/day which the pump converted to 0.8996 mg/day.    CADY March 2028 less than 41 months    Patient's pump refill date is estimated for 12/11/2024.        --- Follow-up in the office as needed for intrathecal pain pump refill and possible rate increase.                  Dictated utilizing Dragon dictation.

## 2024-11-26 ENCOUNTER — OFFICE VISIT (OUTPATIENT)
Age: 79
End: 2024-11-26
Payer: MEDICARE

## 2024-11-26 VITALS
WEIGHT: 141.4 LBS | OXYGEN SATURATION: 95 % | SYSTOLIC BLOOD PRESSURE: 128 MMHG | DIASTOLIC BLOOD PRESSURE: 72 MMHG | BODY MASS INDEX: 19.8 KG/M2 | HEIGHT: 71 IN | TEMPERATURE: 98 F | HEART RATE: 72 BPM

## 2024-11-26 DIAGNOSIS — M48.062 SPINAL STENOSIS OF LUMBAR REGION WITH NEUROGENIC CLAUDICATION: Primary | ICD-10-CM

## 2024-11-26 DIAGNOSIS — Z97.8 PRESENCE OF INTRATHECAL PUMP: ICD-10-CM

## 2024-11-26 DIAGNOSIS — Z79.899 HIGH RISK MEDICATION USE: ICD-10-CM

## 2024-11-26 DIAGNOSIS — G89.29 OTHER CHRONIC PAIN: ICD-10-CM

## 2024-11-26 DIAGNOSIS — M54.16 LUMBAR RADICULOPATHY: ICD-10-CM

## 2024-11-26 PROCEDURE — 62369 ANAL SP INF PMP W/REPRG&FILL: CPT

## 2024-11-26 PROCEDURE — 1125F AMNT PAIN NOTED PAIN PRSNT: CPT

## 2024-11-26 PROCEDURE — 1159F MED LIST DOCD IN RCRD: CPT

## 2024-11-26 PROCEDURE — 1160F RVW MEDS BY RX/DR IN RCRD: CPT

## 2024-11-26 PROCEDURE — 99214 OFFICE O/P EST MOD 30 MIN: CPT

## 2024-11-26 NOTE — PROGRESS NOTES
CHIEF COMPLAINT  Back pain    Subjective   Saurav Cotto Sr. is a 79 y.o. male  who presents for follow-up.  He has a history of chronic low back pain. He reports that his pain has remained consistent since his last office visit.     Today pain is 7/10VAS in severity. His main complaint today is axial low back that radiates across his low back in a bandlike distribution. Describes this pain as a nearly continuous ache. He continues with IT Morphine and OTC Tylenol PRN for pain. He feels that his pain was better managed with oral Morphine (60mg BID). He is unable to sleep in his bed due to increased pain when lying flat.      He is receiving home health PT twice a week and his wife feels that this is making him stronger.     He as seen by KIAH Sanches on 11/20/24 with complaints of worsening low back pain and no relief from recent IT pimp increase. IT pump Morphine was increased to 0.9mg/day that time. He notes no improvement to his pain with this increase. He continues to note that pain was better controlled with intrathecal Prialt and oral Morphine 60mg BID.      Hydrocodone caused nightmares     Surgical History:  7/12/24 - Left total shoulder replacement - Dr. Daryl Booth     Back Pain  This is a chronic problem. The current episode started more than 1 year ago. The problem occurs constantly. The problem is unchanged. The pain is present in the lumbar spine. The quality of the pain is described as aching. The pain does not radiate. The pain is at a severity of 7/10. The pain is moderate. The symptoms are aggravated by standing, sitting, position, bending and twisting. Associated symptoms include weakness. Pertinent negatives include no abdominal pain, chest pain, dysuria, fever, headaches or numbness. He has tried heat, ice, analgesics and bed rest (IT Prialt, MS Contin) for the symptoms. The treatment provided mild relief.     PEG Assessment   What number best describes your pain on average in the past  "week?6  What number best describes how, during the past week, pain has interfered with your enjoyment of life?10  What number best describes how, during the past week, pain has interfered with your general activity?  10    Review of Pertinent Medical Data ---        The following portions of the patient's history were reviewed and updated as appropriate: allergies, current medications, past family history, past medical history, past social history, past surgical history, and problem list.    Review of Systems   Constitutional:  Positive for fatigue. Negative for activity change, chills and fever.   HENT:  Positive for congestion.    Eyes:  Negative for visual disturbance.   Respiratory:  Negative for chest tightness and shortness of breath.    Cardiovascular:  Negative for chest pain.   Gastrointestinal:  Positive for constipation and diarrhea. Negative for abdominal pain.   Genitourinary:  Negative for difficulty urinating and dysuria.   Musculoskeletal:  Positive for back pain.   Neurological:  Positive for weakness. Negative for dizziness, light-headedness, numbness and headaches.   Psychiatric/Behavioral:  Negative for agitation, self-injury, sleep disturbance and suicidal ideas. The patient is not nervous/anxious.      I have reviewed and confirmed the accuracy of the ROS as documented by the MA/LPN/RN ABDULKADIR Solorzano    Vitals:    11/26/24 0853   BP: 128/72   Pulse: 72   Temp: 98 °F (36.7 °C)   SpO2: 95%   Weight: 64.1 kg (141 lb 6.4 oz)   Height: 180.3 cm (71\")   PainSc:   7   PainLoc: Back     Objective   Physical Exam  Constitutional:       Appearance: Normal appearance.   HENT:      Head: Normocephalic.   Cardiovascular:      Rate and Rhythm: Normal rate and regular rhythm.   Pulmonary:      Effort: Pulmonary effort is normal.      Breath sounds: Normal breath sounds.   Musculoskeletal:      Cervical back: Normal range of motion.      Lumbar back: Tenderness and bony tenderness present. Decreased " range of motion.   Skin:     General: Skin is warm and dry.      Capillary Refill: Capillary refill takes less than 2 seconds.   Neurological:      General: No focal deficit present.      Mental Status: He is alert and oriented to person, place, and time.   Psychiatric:         Mood and Affect: Mood normal.         Behavior: Behavior normal.         Thought Content: Thought content normal.         Cognition and Memory: Cognition normal.       Assessment & Plan   Diagnoses and all orders for this visit:    1. Spinal stenosis of lumbar region with neurogenic claudication (Primary)    2. Lumbar radiculopathy    3. Presence of intrathecal pump    4. Other chronic pain    5. High risk medication use      Intrathecal pump was interrogated in office today. Results are in chart.     The patient is currently at a dose of 0.9 mg/day of Morphine 2 mg/ml.      Under sterile technique, the pump was accessed using a [employer] proprietary refill kit. The volume was withdrawn from the pump. The expected residual volume of the pump was 9.1 ml. The actual residual volume of the pump was 9.25 ml.     The pump was then refilled with 20 ml of Morphine at a concentration of 2 mg/ml.  The pump was increased to a rate of 1.0 mg/day.      Patient tolerated procedure well. Minimal bleeding was noted. Pump site remains intact with no evidence of erythema or drainage.     Patient will return for pump refill when due or sooner if needed.      CADY is 41 months (March 2028)     Pump refill date is on/before 1/1/25    Saurav Cotto . reports a pain score of 7.  Given his pain assessment as noted, treatment options were discussed and the following options were decided upon as a follow-up plan to address the patient's pain: continuation of current treatment plan for pain.    --- The urine drug screen confirmation from 10/29/24 has been reviewed and the result is appropriate based on patient history and WILDER report  --- Discussed plan of care  with Dr. Brian. IT Morphine 2mg/ml was increased to a rate of 1.0 mg/day. Will continue to titrate upward as necessary.   --- Follow-up 2 weeks to assess medication increase      WILDER REPORT  As part of the patient's treatment plan, I am prescribing controlled substances. The patient has been made aware of appropriate use of such medications, including potential risk of somnolence, limited ability to drive and/or work safely, and the potential for dependence or overdose. It has also been made clear that these medications are for use by this patient only, without concomitant use of alcohol or other substances unless prescribed.     Patient has completed prescribing agreement detailing terms of continued prescribing of controlled substances, including monitoring WILDER reports, urine drug screening, and pill counts if necessary. The patient is aware that inappropriate use will results in cessation of prescribing such medications.    As the clinician, I personally reviewed the WILDER from 11/26/24 while the patient was in the office today.    History and physical exam exhibit continued safe and appropriate use of controlled substances.    Dictated utilizing Dragon dictation.

## 2024-12-06 ENCOUNTER — TELEPHONE (OUTPATIENT)
Dept: PAIN MEDICINE | Facility: CLINIC | Age: 79
End: 2024-12-06

## 2024-12-06 NOTE — TELEPHONE ENCOUNTER
Caller: Catia Fernandez    Relationship: Emergency Contact    Best call back number:    What is the best time to reach you: ANY     Who are you requesting to speak with (clinical staff, provider,  specific staff member): CLINICAL    What was the call regarding: PATIENT HAD A FALL AND WAS PRESCRIBED PAIN MEDICATION BUT SPOUSE WANTED TO MAKE SURE ITS OK TO GIVE HIM MEDICATION BY MOUTH OF OXYCODONE PRESCRIBED FROM ER VISIT    Is it okay if the provider responds through MyChart: PLEASE CALL

## 2024-12-12 DIAGNOSIS — Z86.73 HISTORY OF TIA (TRANSIENT ISCHEMIC ATTACK): ICD-10-CM

## 2024-12-13 ENCOUNTER — TELEPHONE (OUTPATIENT)
Dept: PAIN MEDICINE | Facility: CLINIC | Age: 79
End: 2024-12-13

## 2024-12-13 ENCOUNTER — OFFICE VISIT (OUTPATIENT)
Dept: PAIN MEDICINE | Facility: CLINIC | Age: 79
End: 2024-12-13
Payer: MEDICARE

## 2024-12-13 VITALS
BODY MASS INDEX: 20.27 KG/M2 | DIASTOLIC BLOOD PRESSURE: 82 MMHG | HEART RATE: 76 BPM | SYSTOLIC BLOOD PRESSURE: 118 MMHG | OXYGEN SATURATION: 97 % | TEMPERATURE: 96.6 F | WEIGHT: 144.8 LBS | HEIGHT: 71 IN

## 2024-12-13 DIAGNOSIS — Z98.890 S/P INSERTION OF INTRATHECAL PUMP: ICD-10-CM

## 2024-12-13 DIAGNOSIS — M48.062 SPINAL STENOSIS OF LUMBAR REGION WITH NEUROGENIC CLAUDICATION: Primary | ICD-10-CM

## 2024-12-13 DIAGNOSIS — M54.16 LUMBAR RADICULOPATHY: ICD-10-CM

## 2024-12-13 DIAGNOSIS — Z79.899 HIGH RISK MEDICATION USE: ICD-10-CM

## 2024-12-13 DIAGNOSIS — Z97.8 PRESENCE OF INTRATHECAL PUMP: ICD-10-CM

## 2024-12-13 DIAGNOSIS — G89.29 OTHER CHRONIC PAIN: ICD-10-CM

## 2024-12-13 PROCEDURE — 1159F MED LIST DOCD IN RCRD: CPT

## 2024-12-13 PROCEDURE — 1125F AMNT PAIN NOTED PAIN PRSNT: CPT

## 2024-12-13 PROCEDURE — 99214 OFFICE O/P EST MOD 30 MIN: CPT

## 2024-12-13 PROCEDURE — 1160F RVW MEDS BY RX/DR IN RCRD: CPT

## 2024-12-13 RX ORDER — CLOPIDOGREL BISULFATE 75 MG/1
75 TABLET ORAL DAILY
Qty: 90 TABLET | Refills: 0 | OUTPATIENT
Start: 2024-12-13

## 2024-12-13 RX ORDER — OXYCODONE AND ACETAMINOPHEN 10; 325 MG/1; MG/1
1 TABLET ORAL EVERY 8 HOURS PRN
Qty: 15 TABLET | Refills: 0 | Status: SHIPPED | OUTPATIENT
Start: 2024-12-13

## 2024-12-13 NOTE — TELEPHONE ENCOUNTER
Patient seen in office today. Reviewed HEIDY and WILDER (scanned into chart). Both updated and appropriate. Refill appropriate. Per our conversation, will send over a 5 day supply to bridge patient until next IT pump refill on 12/19/24

## 2024-12-13 NOTE — PROGRESS NOTES
CHIEF COMPLAINT  Back pain    Subjective   Saurav Cotto Sr. is a 79 y.o. male  who presents for follow-up.  He has a history of chronic low back pain. He reports that his pain has worsened since his last office visit. He reports that he fell on 12/6/24 and was in the ER at [facility] on this date where at CT scan was performed and he was prescribed Oxycodone.     Today pain is 7/10VAS in severity. His main complaint today is axial low back that radiates across his low back in a bandlike distribution. Describes this pain as a nearly continuous ache. He continues with IT Morphine and OTC Tylenol PRN for pain. He continues to report that his pain was better controlled with oral Morphine (60mg BID). He is unable to sleep in his bed due to increased pain when lying flat.      He is receiving home health PT twice a week and his wife feels that this is making him stronger.     Hydrocodone caused nightmares     Surgical History:  7/12/24 - Left total shoulder replacement - Dr. Daryl Booth      Back Pain  This is a chronic problem. The current episode started more than 1 year ago. The problem occurs constantly. The problem has been worse since onset. The pain is present in the lumbar spine. The quality of the pain is described as aching. The pain does not radiate. The pain is at a severity of 9/10. The pain is moderate. The symptoms are aggravated by standing, sitting, position, bending and twisting. Associated symptoms include weakness (bilateral leg). Pertinent negatives include no abdominal pain, chest pain, dysuria, fever, headaches or numbness. He has tried heat, ice, analgesics and bed rest (DHARMESH Segal, MS Contin) for the symptoms. The treatment provided mild relief.     PEG Assessment   What number best describes your pain on average in the past week?7  What number best describes how, during the past week, pain has interfered with your enjoyment of life?10  What number best describes how, during the past  "week, pain has interfered with your general activity?  10    Review of Pertinent Medical Data ---        The following portions of the patient's history were reviewed and updated as appropriate: allergies, current medications, past family history, past medical history, past social history, past surgical history, and problem list.    Review of Systems   Constitutional:  Negative for chills and fever.   Respiratory:  Positive for cough. Negative for shortness of breath.    Cardiovascular:  Negative for chest pain.   Gastrointestinal:  Positive for constipation and diarrhea. Negative for abdominal pain.   Genitourinary:  Negative for difficulty urinating and dysuria.   Musculoskeletal:  Positive for back pain and gait problem (ambulates with a walker).   Neurological:  Positive for weakness (bilateral leg). Negative for dizziness, light-headedness, numbness and headaches.   Psychiatric/Behavioral:  Negative for agitation.      I have reviewed and confirmed the accuracy of the ROS as documented by the MA/LPN/RN ABDULKADIR Solorzano    Vitals:    12/13/24 1440   BP: 118/82   BP Location: Left arm   Patient Position: Sitting   Pulse: 76   Temp: 96.6 °F (35.9 °C)   TempSrc: Temporal   SpO2: 97%   Weight: 65.7 kg (144 lb 12.8 oz)   Height: 180.3 cm (71\")   PainSc:   9   PainLoc: Back     Objective   Physical Exam  Constitutional:       Appearance: Normal appearance.   HENT:      Head: Normocephalic.   Cardiovascular:      Rate and Rhythm: Normal rate and regular rhythm.   Pulmonary:      Effort: Pulmonary effort is normal.      Breath sounds: Normal breath sounds.   Musculoskeletal:      Cervical back: Normal range of motion.      Lumbar back: Tenderness and bony tenderness present. Decreased range of motion.   Skin:     General: Skin is warm and dry.      Capillary Refill: Capillary refill takes less than 2 seconds.   Neurological:      General: No focal deficit present.      Mental Status: He is alert and oriented to " person, place, and time.   Psychiatric:         Mood and Affect: Mood normal.         Behavior: Behavior normal.         Thought Content: Thought content normal.         Cognition and Memory: Cognition normal.       Intrathecal pump was interrogated in office today. Results are in chart.     The patient is currently at a dose of 0.9 mg/day of Morphine 2 mg/ml. The pump was increased to a rate of 1.1 mg/day.      Pump site remains intact with no evidence of erythema or drainage.     Patient will return for pump refill when due or sooner if needed.      CADY is 41 months (March 2028)     Pump refill date is on/before 12/30/24    Assessment & Plan   Diagnoses and all orders for this visit:    1. Spinal stenosis of lumbar region with neurogenic claudication (Primary)    2. Lumbar radiculopathy    3. Presence of intrathecal pump    4. Other chronic pain    5. High risk medication use    6. S/P insertion of intrathecal pump        Saurav Cotto Sr. reports a pain score of 9.  Given his pain assessment as noted, treatment options were discussed and the following options were decided upon as a follow-up plan to address the patient's pain: continuation of current treatment plan for pain and prescription for opiod analgesics.    --- The urine drug screen confirmation from 10/29/24 has been reviewed and the result is appropriate based on patient history and WILDER report  --- Discussed plan of care with Dr. Brian. IT Morphine 2mg/ml was increased to a rate of 1.1 mg/day. Plan will next refill be to change medication to Morphine 5mg/mL with Ziconotide 2.5 mcg/mL at the rate of 1.25 mg/per day to see if this is more effective for his pain.   --- Will provide a small quantity of Oxycodone 10mg 3/day PRN to bridge patient until next pump refill. Patient appears stable with current regimen. No adverse effects. Regarding continuation of opioids, there is no evidence of aberrant behavior or any red flags. The patient continues  with appropriate response to opioid therapy. ADL's remain intact by self.   --- Left message at [facility] to fax records recent ED records and CT scan. Office closed at 3:45pm.   --- Follow-up on 12/19/24 for IT Pump refill      WILDER REPORT  As part of the patient's treatment plan, I am prescribing controlled substances. The patient has been made aware of appropriate use of such medications, including potential risk of somnolence, limited ability to drive and/or work safely, and the potential for dependence or overdose. It has also been made clear that these medications are for use by this patient only, without concomitant use of alcohol or other substances unless prescribed.     Patient has completed prescribing agreement detailing terms of continued prescribing of controlled substances, including monitoring WILDER reports, urine drug screening, and pill counts if necessary. The patient is aware that inappropriate use will results in cessation of prescribing such medications.    As the clinician, I personally reviewed the WILDER from 12/13/24 while the patient was in the office today.    History and physical exam exhibit continued safe and appropriate use of controlled substances.    Dictated utilizing Dragon dictation.

## 2024-12-13 NOTE — TELEPHONE ENCOUNTER
Caller: EWELINA    Relationship to patient: SPOUSE    Best call back number: [phone]      Type of visit: APPT 12/19/24 NEEDS TO BE RESCHEDULED- HAS ANOTHER APPT SAME DAY- PLEASE CALL

## 2024-12-16 ENCOUNTER — TELEPHONE (OUTPATIENT)
Dept: PAIN MEDICINE | Facility: CLINIC | Age: 79
End: 2024-12-16
Payer: MEDICARE

## 2024-12-16 NOTE — TELEPHONE ENCOUNTER
RECEIVED MESSAGE THAT PATIENT COULDN'T MAKE APPOINTMENT BECAUSE OF ANOTHER APPOINTMENT, CALLED EWELINA PT WIFE BACK SHE SAID THEY WORK EVERYTHING OUT GOOD FOR PAIN PUMP REFILL

## 2024-12-18 NOTE — TELEPHONE ENCOUNTER
I removed the prialt. Please review to see if you want to make any changes to the medication so we can have it delivered by tomorrow. Please let me know when you have sent this in so I can call [facility] and make sure it will be shipped today.

## 2024-12-19 ENCOUNTER — OFFICE VISIT (OUTPATIENT)
Dept: PAIN MEDICINE | Facility: CLINIC | Age: 79
End: 2024-12-19
Payer: MEDICARE

## 2024-12-19 VITALS
OXYGEN SATURATION: 95 % | HEIGHT: 71 IN | DIASTOLIC BLOOD PRESSURE: 76 MMHG | SYSTOLIC BLOOD PRESSURE: 124 MMHG | BODY MASS INDEX: 20.37 KG/M2 | HEART RATE: 88 BPM | WEIGHT: 145.5 LBS | TEMPERATURE: 98.2 F | RESPIRATION RATE: 18 BRPM

## 2024-12-19 DIAGNOSIS — M48.062 SPINAL STENOSIS OF LUMBAR REGION WITH NEUROGENIC CLAUDICATION: ICD-10-CM

## 2024-12-19 DIAGNOSIS — M54.16 LUMBAR RADICULOPATHY: ICD-10-CM

## 2024-12-19 DIAGNOSIS — M48.062 SPINAL STENOSIS OF LUMBAR REGION WITH NEUROGENIC CLAUDICATION: Primary | ICD-10-CM

## 2024-12-19 DIAGNOSIS — G89.29 OTHER CHRONIC PAIN: ICD-10-CM

## 2024-12-19 DIAGNOSIS — Z79.899 HIGH RISK MEDICATION USE: ICD-10-CM

## 2024-12-19 DIAGNOSIS — Z97.8 PRESENCE OF INTRATHECAL PUMP: ICD-10-CM

## 2024-12-19 PROCEDURE — 95990 SPIN/BRAIN PUMP REFIL & MAIN: CPT | Performed by: PHYSICIAN ASSISTANT

## 2024-12-19 PROCEDURE — 99214 OFFICE O/P EST MOD 30 MIN: CPT | Performed by: PHYSICIAN ASSISTANT

## 2024-12-19 PROCEDURE — 1160F RVW MEDS BY RX/DR IN RCRD: CPT | Performed by: PHYSICIAN ASSISTANT

## 2024-12-19 PROCEDURE — 1125F AMNT PAIN NOTED PAIN PRSNT: CPT | Performed by: PHYSICIAN ASSISTANT

## 2024-12-19 PROCEDURE — 1159F MED LIST DOCD IN RCRD: CPT | Performed by: PHYSICIAN ASSISTANT

## 2024-12-19 RX ORDER — OXYCODONE AND ACETAMINOPHEN 10; 325 MG/1; MG/1
1 TABLET ORAL EVERY 8 HOURS PRN
Qty: 15 TABLET | Refills: 0 | Status: SHIPPED | OUTPATIENT
Start: 2024-12-19

## 2024-12-19 NOTE — PROGRESS NOTES
CHIEF COMPLAINT  Follow-up for pain pump refill.      Subjective   Saurav Cotto Ben is a 79 y.o. male  who presents for follow-up.  He has a history of chronic low back pain.  This patient continues to have persistent and unrelenting pain in a bandlike distribution across the lumbar spine that has not been alleviated with recent increases of intrathecal morphine.  On 12/13/2024 the patient was evaluated by ABDULKADIR Leger and his intrathecal rate of morphine was increased to 1.1 mg a day without significant reduction of pain.  On that same office visit the patient was provided with Percocet 10 mg #15 tablets to utilize for breakthrough pain which she feels has been helpful.    Patient continues to maintain that he feels his pain was better maintained when he was taking oral morphine 60 mg twice daily.    Continues with home health physical therapy twice weekly which he feels has helped somewhat.    Surgical History:  7/12/24 - Left total shoulder replacement - Dr. Daryl Booth     Pain today 8/10 VAS in severity.      Back Pain  This is a chronic problem. The current episode started more than 1 year ago. The problem occurs constantly. The problem is unchanged. The pain is present in the lumbar spine. The quality of the pain is described as aching, burning, shooting and stabbing. The pain does not radiate. The pain is at a severity of 8/10. The pain is severe. The pain is The same all the time. The symptoms are aggravated by standing, twisting, lying down, position and bending. Pertinent negatives include no fever, numbness or weakness.        PEG Assessment   What number best describes your pain on average in the past week?7  What number best describes how, during the past week, pain has interfered with your enjoyment of life?10  What number best describes how, during the past week, pain has interfered with your general activity?  8    Review of Pertinent Medical Data ---  No new imaging for review on  "today    The following portions of the patient's history were reviewed and updated as appropriate: allergies, current medications, past family history, past medical history, past social history, past surgical history, and problem list.    Review of Systems   Constitutional:  Negative for fever.   Gastrointestinal:  Positive for constipation and diarrhea.   Genitourinary:  Negative for difficulty urinating.   Musculoskeletal:  Positive for back pain.   Neurological:  Negative for weakness and numbness.   Psychiatric/Behavioral:  Negative for sleep disturbance and suicidal ideas. The patient is not nervous/anxious.      I have reviewed and confirmed the accuracy of the ROS as documented by the MA/LPN/RN KIAH Chacon  Vitals:    12/19/24 0943   BP: 124/76   Pulse: 88   Resp: 18   Temp: 98.2 °F (36.8 °C)   SpO2: 95%   Weight: 66 kg (145 lb 8 oz)   Height: 180.3 cm (71\")   PainSc:   8   PainLoc: Back         Objective   Physical Exam  Vitals and nursing note reviewed. Exam conducted with a chaperone present (Son).   Constitutional:       Appearance: Normal appearance.      Comments: Frail-appearing elderly adult male   HENT:      Head: Normocephalic.   Musculoskeletal:      Lumbar back: Tenderness present.        Back:    Neurological:      Mental Status: He is alert and oriented to person, place, and time.      Cranial Nerves: Cranial nerves 2-12 are intact.      Motor: Weakness present.      Gait: Gait abnormal (Ambulates with use of  assistive device).   Psychiatric:         Mood and Affect: Mood normal.         Behavior: Behavior normal.         Thought Content: Thought content normal.         Judgment: Judgment normal.         Assessment & Plan   Diagnoses and all orders for this visit:    1. Spinal stenosis of lumbar region with neurogenic claudication (Primary)    2. Presence of intrathecal pump    3. High risk medication use        Intrathecal pump was interrogated in office today. Results are in " chart.    The patient is currently at a dose of 1.1 milligram per day of morphine 2 mg/mL.    Under sterile technique, the pump was accessed using a [employer] proprietary refill kit. The volume was withdrawn from the pump. The expected residual volume of the pump was 8.2 ml. The actual residual volume of the pump was 10 ml.    The pump was then refilled with 20 ml of hydromorphone at a concentration of 4 mg/ml.  The pump was set to proceed at a rate of 0.75 mg/day of hydromorphone.    The patient underwent a bridge bolus of the morphine 1.1 mg/day to deliver over the next 14 hours and 56 minutes before the patient would receive the hydromorphone.    Patient tolerated procedure well. Minimal bleeding was noted. Pump site remains intact with no evidence of erythema or drainage.    Patient will return for pump refill when due or sooner if needed.     CADY is less than 40 months/March 2028.    Pump refill date is before 3/23/2025.          Saurav Cotto Sr. reports a pain score of 8.  Given his pain assessment as noted, treatment options were discussed and the following options were decided upon as a follow-up plan to address the patient's pain: continuation of current treatment plan for pain, prescription for opiod analgesics, and use of non-medical modalities (ice, heat, stretching and/or behavior modifications).      --- Follow-up in the office as scheduled prior to his next refill date  --- Prescription for Percocet refill request sent to Dr. Brian.           WILDER REPORT  As part of the patient's treatment plan, I am prescribing controlled substances. The patient has been made aware of appropriate use of such medications, including potential risk of somnolence, limited ability to drive and/or work safely, and the potential for dependence or overdose. It has also been made clear that these medications are for use by this patient only, without concomitant use of alcohol or other substances unless prescribed.      Patient has completed prescribing agreement detailing terms of continued prescribing of controlled substances, including monitoring WILDER reports, urine drug screening, and pill counts if necessary. The patient is aware that inappropriate use will results in cessation of prescribing such medications.    As the clinician, I personally reviewed the WILDER from 12/19/2024 while the patient was in the office today.    History and physical exam exhibit continued safe and appropriate use of controlled substances.     Dictated utilizing Dragon dictation.

## 2024-12-21 DIAGNOSIS — G31.84 MCI (MILD COGNITIVE IMPAIRMENT) WITH MEMORY LOSS: ICD-10-CM

## 2024-12-23 RX ORDER — RIVASTIGMINE TARTRATE 6 MG/1
6 CAPSULE ORAL 2 TIMES DAILY
Qty: 180 CAPSULE | Refills: 0 | Status: SHIPPED | OUTPATIENT
Start: 2024-12-23

## 2024-12-30 ENCOUNTER — TELEPHONE (OUTPATIENT)
Dept: PAIN MEDICINE | Facility: CLINIC | Age: 79
End: 2024-12-30

## 2024-12-30 NOTE — TELEPHONE ENCOUNTER
Spoke to patient's wife and she states that he started having SOA on Friday 12/27/24, stating that when he gets up to use the bathroom, using his walker, he is out of breath by the time he is finished and sits back down. She states they were told this could be a side effect of the Dilaudid. Patient's pain pump medication was switched to Dilaudid on 12/19/24 and since a week had passed on the medication without SOA, recommended patient be seen in the ED for evaluation. Patient's wife agreed

## 2024-12-30 NOTE — TELEPHONE ENCOUNTER
I don't think this is relative to hydromorphone, that would be very strange.  I agree with the ED evaluation

## 2024-12-30 NOTE — TELEPHONE ENCOUNTER
"HUB ATTEMPTED CLINICAL WARM TRANSFER - NO ANSWER     Caller: Ewelina Fernandez / WIFE     Best call back number: [phone]     Which medication are you concerned about: MEDICATION PUT IN PAIN PUMP (AT 12/19/24 APPT)     Who prescribed you this medication: DR BRANDT     When did you start taking this medication: AT / AFTER 12/19 APPT w/YOLANDA FOY    What are your concerns: WIFE EWELINA REPORTS PATIENT \"HAS BEEN SHORT OF BREATH AFTER EVERY TIME HE GETS UP TO GO TO THE BATHROOM\" (\"ONLY TIME HE's GETTING UP TO WALK AROUND\")     How long have you had these concerns: \"FIRST NOTICED LAST FRI 12/27, WORSE ON SATURDAY 12/28, BUT ABOUT THE SAME SINCE THEN\"     WIFE EWELINA STATED THEY \"WERE TOLD SHORTNESS OF BREATH CAN BE ONE OF THE SIDE EFFECTS OF THE MEDICATION\" BEING ADDED TO THE PAIN PUMP     PER HUB REF TOOL RED FLAG KEYWORD PROTOCOL WIFE DIRECTED TO TAKE PATIENT TO BE EVALUATED AT LOCAL ER     THANKS   "

## 2024-12-31 ENCOUNTER — HOSPITAL ENCOUNTER (EMERGENCY)
Facility: HOSPITAL | Age: 79
Discharge: ANOTHER HOSPITAL | End: 2025-01-01
Attending: STUDENT IN AN ORGANIZED HEALTH CARE EDUCATION/TRAINING PROGRAM | Admitting: STUDENT IN AN ORGANIZED HEALTH CARE EDUCATION/TRAINING PROGRAM
Payer: MEDICARE

## 2024-12-31 ENCOUNTER — APPOINTMENT (OUTPATIENT)
Dept: GENERAL RADIOLOGY | Facility: HOSPITAL | Age: 79
End: 2024-12-31
Payer: MEDICARE

## 2024-12-31 DIAGNOSIS — J81.0 ACUTE PULMONARY EDEMA: Primary | ICD-10-CM

## 2024-12-31 DIAGNOSIS — R79.89 ELEVATED BRAIN NATRIURETIC PEPTIDE (BNP) LEVEL: ICD-10-CM

## 2024-12-31 DIAGNOSIS — R09.02 HYPOXIA: ICD-10-CM

## 2024-12-31 LAB
ALBUMIN SERPL-MCNC: 3 G/DL (ref 3.5–5.2)
ALBUMIN/GLOB SERPL: 1.1 G/DL
ALP SERPL-CCNC: 247 U/L (ref 39–117)
ALT SERPL W P-5'-P-CCNC: 54 U/L (ref 1–41)
ANION GAP SERPL CALCULATED.3IONS-SCNC: 13.9 MMOL/L (ref 5–15)
ARTERIAL PATENCY WRIST A: ABNORMAL
AST SERPL-CCNC: 53 U/L (ref 1–40)
ATMOSPHERIC PRESS: 732 MMHG
BASE EXCESS BLDA CALC-SCNC: -0.4 MMOL/L (ref 0–2)
BASOPHILS # BLD AUTO: 0.01 10*3/MM3 (ref 0–0.2)
BASOPHILS NFR BLD AUTO: 0.1 % (ref 0–1.5)
BDY SITE: ABNORMAL
BILIRUB SERPL-MCNC: 0.4 MG/DL (ref 0–1.2)
BODY TEMPERATURE: 37
BUN SERPL-MCNC: 47 MG/DL (ref 8–23)
BUN/CREAT SERPL: 41.2 (ref 7–25)
CALCIUM SPEC-SCNC: 8.8 MG/DL (ref 8.6–10.5)
CHLORIDE SERPL-SCNC: 102 MMOL/L (ref 98–107)
CO2 SERPL-SCNC: 20.1 MMOL/L (ref 22–29)
CREAT SERPL-MCNC: 1.14 MG/DL (ref 0.76–1.27)
DEPRECATED RDW RBC AUTO: 47.2 FL (ref 37–54)
EGFRCR SERPLBLD CKD-EPI 2021: 65.8 ML/MIN/1.73
EOSINOPHIL # BLD AUTO: 0 10*3/MM3 (ref 0–0.4)
EOSINOPHIL NFR BLD AUTO: 0 % (ref 0.3–6.2)
ERYTHROCYTE [DISTWIDTH] IN BLOOD BY AUTOMATED COUNT: 14 % (ref 12.3–15.4)
GAS FLOW AIRWAY: 1 LPM
GEN 5 1HR TROPONIN T REFLEX: 24 NG/L
GLOBULIN UR ELPH-MCNC: 2.7 GM/DL
GLUCOSE SERPL-MCNC: 278 MG/DL (ref 65–99)
HCO3 BLDA-SCNC: 24.5 MMOL/L (ref 20–26)
HCT VFR BLD AUTO: 37 % (ref 37.5–51)
HGB BLD-MCNC: 12.2 G/DL (ref 13–17.7)
HGB BLDA-MCNC: 11.8 G/DL (ref 14–18)
HOLD SPECIMEN: NORMAL
HOLD SPECIMEN: NORMAL
IMM GRANULOCYTES # BLD AUTO: 0.1 10*3/MM3 (ref 0–0.05)
IMM GRANULOCYTES NFR BLD AUTO: 1.4 % (ref 0–0.5)
LYMPHOCYTES # BLD AUTO: 0.24 10*3/MM3 (ref 0.7–3.1)
LYMPHOCYTES NFR BLD AUTO: 3.5 % (ref 19.6–45.3)
Lab: ABNORMAL
MCH RBC QN AUTO: 30.8 PG (ref 26.6–33)
MCHC RBC AUTO-ENTMCNC: 33 G/DL (ref 31.5–35.7)
MCV RBC AUTO: 93.4 FL (ref 79–97)
MODALITY: ABNORMAL
MONOCYTES # BLD AUTO: 0.88 10*3/MM3 (ref 0.1–0.9)
MONOCYTES NFR BLD AUTO: 12.7 % (ref 5–12)
NEUTROPHILS NFR BLD AUTO: 5.72 10*3/MM3 (ref 1.7–7)
NEUTROPHILS NFR BLD AUTO: 82.3 % (ref 42.7–76)
NRBC BLD AUTO-RTO: 0 /100 WBC (ref 0–0.2)
NT-PROBNP SERPL-MCNC: 5827 PG/ML (ref 0–1800)
PCO2 BLDA: 39.9 MM HG (ref 35–45)
PCO2 TEMP ADJ BLD: 39.9 MM HG (ref 35–45)
PH BLDA: 7.4 PH UNITS (ref 7.35–7.45)
PH, TEMP CORRECTED: 7.4 PH UNITS (ref 7.35–7.45)
PLATELET # BLD AUTO: 328 10*3/MM3 (ref 140–450)
PMV BLD AUTO: 9.7 FL (ref 6–12)
PO2 BLDA: 68.9 MM HG (ref 83–108)
PO2 TEMP ADJ BLD: 68.9 MM HG (ref 83–108)
POTASSIUM SERPL-SCNC: 4.6 MMOL/L (ref 3.5–5.2)
PROT SERPL-MCNC: 5.7 G/DL (ref 6–8.5)
RBC # BLD AUTO: 3.96 10*6/MM3 (ref 4.14–5.8)
SAO2 % BLDCOA: 92.6 % (ref 94–99)
SODIUM SERPL-SCNC: 136 MMOL/L (ref 136–145)
TROPONIN T % DELTA: -8 %
TROPONIN T NUMERIC DELTA: -2 NG/L
TROPONIN T SERPL HS-MCNC: 26 NG/L
VENTILATOR MODE: ABNORMAL
WBC NRBC COR # BLD AUTO: 6.95 10*3/MM3 (ref 3.4–10.8)
WHOLE BLOOD HOLD COAG: NORMAL
WHOLE BLOOD HOLD SPECIMEN: NORMAL

## 2024-12-31 PROCEDURE — 93005 ELECTROCARDIOGRAM TRACING: CPT

## 2024-12-31 PROCEDURE — 80053 COMPREHEN METABOLIC PANEL: CPT

## 2024-12-31 PROCEDURE — 83880 ASSAY OF NATRIURETIC PEPTIDE: CPT

## 2024-12-31 PROCEDURE — 85025 COMPLETE CBC W/AUTO DIFF WBC: CPT

## 2024-12-31 PROCEDURE — 84484 ASSAY OF TROPONIN QUANT: CPT | Performed by: STUDENT IN AN ORGANIZED HEALTH CARE EDUCATION/TRAINING PROGRAM

## 2024-12-31 PROCEDURE — 25010000002 FUROSEMIDE PER 20 MG: Performed by: STUDENT IN AN ORGANIZED HEALTH CARE EDUCATION/TRAINING PROGRAM

## 2024-12-31 PROCEDURE — 82803 BLOOD GASES ANY COMBINATION: CPT

## 2024-12-31 PROCEDURE — 99285 EMERGENCY DEPT VISIT HI MDM: CPT | Performed by: STUDENT IN AN ORGANIZED HEALTH CARE EDUCATION/TRAINING PROGRAM

## 2024-12-31 PROCEDURE — 36415 COLL VENOUS BLD VENIPUNCTURE: CPT

## 2024-12-31 PROCEDURE — 71046 X-RAY EXAM CHEST 2 VIEWS: CPT

## 2024-12-31 PROCEDURE — 96375 TX/PRO/DX INJ NEW DRUG ADDON: CPT

## 2024-12-31 PROCEDURE — 25810000003 DEXTROSE 5 % AND SODIUM CHLORIDE 0.9 % 5-0.9 % SOLUTION: Performed by: STUDENT IN AN ORGANIZED HEALTH CARE EDUCATION/TRAINING PROGRAM

## 2024-12-31 PROCEDURE — 36600 WITHDRAWAL OF ARTERIAL BLOOD: CPT

## 2024-12-31 PROCEDURE — 93005 ELECTROCARDIOGRAM TRACING: CPT | Performed by: STUDENT IN AN ORGANIZED HEALTH CARE EDUCATION/TRAINING PROGRAM

## 2024-12-31 PROCEDURE — 96365 THER/PROPH/DIAG IV INF INIT: CPT

## 2024-12-31 PROCEDURE — 84484 ASSAY OF TROPONIN QUANT: CPT

## 2024-12-31 PROCEDURE — 96366 THER/PROPH/DIAG IV INF ADDON: CPT

## 2024-12-31 RX ORDER — FUROSEMIDE 10 MG/ML
40 INJECTION INTRAMUSCULAR; INTRAVENOUS ONCE
Status: DISCONTINUED | OUTPATIENT
Start: 2024-12-31 | End: 2024-12-31

## 2024-12-31 RX ORDER — SODIUM CHLORIDE 0.9 % (FLUSH) 0.9 %
10 SYRINGE (ML) INJECTION AS NEEDED
Status: DISCONTINUED | OUTPATIENT
Start: 2024-12-31 | End: 2025-01-01 | Stop reason: HOSPADM

## 2024-12-31 RX ORDER — FUROSEMIDE 10 MG/ML
40 INJECTION INTRAMUSCULAR; INTRAVENOUS ONCE
Status: COMPLETED | OUTPATIENT
Start: 2024-12-31 | End: 2024-12-31

## 2024-12-31 RX ORDER — DEXTROSE MONOHYDRATE AND SODIUM CHLORIDE 5; .9 G/100ML; G/100ML
75 INJECTION, SOLUTION INTRAVENOUS CONTINUOUS
Status: DISCONTINUED | OUTPATIENT
Start: 2024-12-31 | End: 2025-01-01 | Stop reason: HOSPADM

## 2024-12-31 RX ADMIN — DEXTROSE AND SODIUM CHLORIDE 75 ML/HR: 5; 900 INJECTION, SOLUTION INTRAVENOUS at 22:39

## 2024-12-31 RX ADMIN — FUROSEMIDE 40 MG: 10 INJECTION, SOLUTION INTRAVENOUS at 16:57

## 2024-12-31 NOTE — ED PROVIDER NOTES
Subjective   History of Present Illness  79-year-old male with a past medical history of heart transplant on tacrolimus as well as Dilaudid pain pump presenting with complaint that he has been having shortness of breath at home.  He also has generalized weakness.  States that he was recently diagnosed with congestive heart failure started on Lasix p.o. patient states that he has no focal weakness no numbness tingling or weakness of the extremities.  No difficulty with speech.  Present at bedside notes that the patient did have a heart attack, had a heart transplant and goes to [facility] for his cardiologist as well as his transplant team        Review of Systems    Past Medical History:   Diagnosis Date    Acid reflux     Anemia     Anxiety     Asthma     Cervical disc disorder     Chronic back pain     Chronic pain disorder     Colon polyp     Coronary artery disease 1991 1st heart attack    Depression     Disease of thyroid gland     Esophageal varices     Gastroparesis     Headache     Headache, tension-type     Hypertension     Joint pain     Low back pain     Lumbosacral disc disease     MCI (mild cognitive impairment) with memory loss     Myocardial infarct 1991, 1994, 1995, 2002, 2005,    Neuropathy in diabetes     Osteoarthritis     Peripheral neuropathy     Sleep apnea     CPAP    Spinal stenosis     Stroke (cerebrum) 11/2022    Vitamin B 12 deficiency        Allergies   Allergen Reactions    Coreg [Carvedilol] Hives    Singulair [Montelukast Sodium] Other (See Comments)     weakness    Theophyllines Other (See Comments)     weakness       Past Surgical History:   Procedure Laterality Date    APPENDECTOMY      CARDIAC CATHETERIZATION      MULTIPLE    CARDIAC SURGERY  1995    CLOT REMOVED    CHEST SURGERY  11/1995    REPAIR CHEST BONE SURGERY    CHOLECYSTECTOMY  1978    COLONOSCOPY W/ POLYPECTOMY N/A 06/26/2024    Procedure: COLONOSCOPY WITH POLYPECTOMY;  Surgeon: True Cassidy MD;  Location:  [facility];  Service: Gastroenterology;  Laterality: N/A;  diverticulosis, transverse polyp x3, rectal polyp x1    CORONARY ARTERY BYPASS GRAFT      5 VESSELS    EPIDURAL BLOCK      GALLBLADDER SURGERY      HEART TRANSPLANT  06/17/2008    JOINT REPLACEMENT      Ò    KNEE ARTHROPLASTY Left 11/2006    KNEE ARTHROSCOPY Left 1994    KNEE CARTILAGE SURGERY Right 10/17/2017    KNEE SURGERY Bilateral     PAIN PUMP INSERTION/REVISION N/A 07/16/2021    Procedure: PAIN PUMP INSERTION, SPINAL CORD STIMULATOR REMOVAL PRIALT +++ LOW DOSE++;  Surgeon: Stevo Brian MD;  Location: [facility]IN OR;  Service: Pain Management;  Laterality: N/A;    PAIN PUMP TRIAL INJECTION ONLY N/A 06/18/2021    Procedure: Injection Single for Pain Pump Trial;  Surgeon: Stevo Brian MD;  Location: [facility] OR;  Service: Pain Management;  Laterality: N/A;    PROSTATE SURGERY      REPAIR    SHOULDER SURGERY Right     SPINAL CORD STIMULATOR IMPLANT N/A 04/20/2018    Procedure: SPINAL CORD STIMULATOR INSERTION PHASE 2;  Surgeon: Stevo Brian MD;  Location: [facility] MAIN OR;  Service: Pain Management    TOTAL KNEE ARTHROPLASTY REVISION Left 10/30/2009    TOTAL KNEE ARTHROPLASTY REVISION Right     TOTAL SHOULDER ARTHROPLASTY Left 07/12/2024    TOTAL SHOULDER REPLACEMENT Right 08/02/2023    revision    TRIGGER POINT INJECTION         Family History   Problem Relation Age of Onset    Stroke Mother     Dementia Mother     Arthritis Mother     Cancer Mother     Diabetes Mother     Hypertension Mother     Heart disease Mother     Hyperlipidemia Mother     Thyroid disease Mother     Coronary artery disease Mother     Heart disease Father     Hyperlipidemia Father     Hypertension Father     Arthritis Father     Coronary artery disease Father        Social History     Socioeconomic History    Marital status:    Tobacco Use    Smoking status: Former     Current packs/day: 1.00     Types: Cigarettes    Smokeless tobacco: Never   Vaping Use     Vaping status: Never Used   Substance and Sexual Activity    Alcohol use: No    Drug use: No    Sexual activity: Not Currently     Partners: Female           Objective   Physical Exam  Vitals and nursing note reviewed. Exam conducted with a chaperone present.   Constitutional:       General: He is not in acute distress.     Appearance: Normal appearance. He is ill-appearing. He is not toxic-appearing or diaphoretic.      Comments: Resting comfortably and able to answer questions appropriately in full sentences   HENT:      Head: Normocephalic and atraumatic.      Nose: Nose normal.      Mouth/Throat:      Pharynx: No oropharyngeal exudate or posterior oropharyngeal erythema.   Eyes:      Extraocular Movements: Extraocular movements intact.      Conjunctiva/sclera: Conjunctivae normal.      Pupils: Pupils are equal, round, and reactive to light.   Cardiovascular:      Rate and Rhythm: Regular rhythm. Tachycardia present.   Pulmonary:      Effort: Pulmonary effort is normal. Tachypnea present. No accessory muscle usage or respiratory distress.      Breath sounds: No stridor. Decreased breath sounds and rales present. No wheezing or rhonchi.   Chest:      Chest wall: No mass or tenderness.   Abdominal:      General: Abdomen is flat. There is no distension.      Tenderness: There is no abdominal tenderness. There is no guarding or rebound.   Musculoskeletal:         General: No swelling, tenderness, deformity or signs of injury. Normal range of motion.      Cervical back: Normal range of motion.   Skin:     General: Skin is warm and dry.      Capillary Refill: Capillary refill takes less than 2 seconds.      Findings: No erythema or rash.   Neurological:      General: No focal deficit present.      Mental Status: He is alert and oriented to person, place, and time. Mental status is at baseline.      Cranial Nerves: No cranial nerve deficit.      Sensory: No sensory deficit.      Motor: No weakness.   Psychiatric:          Mood and Affect: Mood normal.         Procedures           ED Course                                                       Medical Decision Making  Evaluation consistent with pulmonary edema likely caused by congestive heart failure as he is recently started on diuretics orally and patient does not have an official diagnosis of congestive heart failure that I can identify in the chart.  No echocardiogram noted in the chart.  Patient was overall well-appearing while on oxygen but was still in respiratory distress with tachypnea, low O2 sat in the 85 percentile placed on 2 L via nasal cannula with saturations at 92 to 93%.  Given mild pulmonary edema we will give 40 mg of Lasix IV and will transfer to [facility] given the patient's cardiologist and heart transplant team are both at [facility].  Additionally patient has a pain pump and Dilaudid was recently increased which could also cause cardiovascular depression pain pump is with a private pain management      Problems Addressed:  Acute pulmonary edema: complicated acute illness or injury    Amount and/or Complexity of Data Reviewed  Labs: ordered.  Radiology: ordered.  ECG/medicine tests: ordered.    Risk  Prescription drug management.        Final diagnoses:   Acute pulmonary edema   Elevated brain natriuretic peptide (BNP) level   Hypoxia       ED Disposition  ED Disposition       ED Disposition   Transfer to Another Facility     Condition   --    Comment   --               No follow-up provider specified.       Medication List      No changes were made to your prescriptions during this visit.            Glenroy Boyd MD  12/31/24 8921

## 2024-12-31 NOTE — TELEPHONE ENCOUNTER
Hub staff attempted to follow warm transfer process and was unsuccessful     Caller: UNIQUE MAYO    Relationship to patient: Emergency Contact    Best call back number: [phone]    Patient is needing: PT'S SON IS CALLING TO LEAVE A MESSAGE FOR YOLANDA FOY OR DR BRANDT.. PT CURRENTLY IS HAVING A HARD TIME BREATHING WHEN TRYING TO DO ANYTHING.. PT HAS SLURRED SPEECH AND ONLY WANTS TO SLEEP AT THE MOMENT.. PT'S SON WOULD LIKE A CALL BACK ASAP PT HAS ALSO STOPPED TAKING THE OXYCODONE.. PLEASE ADVISE..

## 2024-12-31 NOTE — TELEPHONE ENCOUNTER
Informed patients son that we can decrease the pain pump but he must come to the office and you would still like him to go to the ER due to him having respiratory issues. The son stated that they will take him to [facility] ER and contact us if they need to decrease pump. I informed him that [employer] rep can meet him at the hospital to decrease this. I called Mitchell from [employer] and informed him of the possibility and he stated that they will meet him at the hospital as long as the ER doctor calls them and asks them to come and decrease the pain pump. I informed Mitchell of the settings and the patient. The can contact [employer] at [phone]

## 2024-12-31 NOTE — TELEPHONE ENCOUNTER
Patients son states that Mr. Cotto is having difficulty breathing and it started before the 27th unlike what his wife stated. He states that his BP is normal. His O2 sat is running between 90-92%. He is having shortness of breath and shallow breathing, he is fatigued and weak. He is slow on arousal. He is having slurred speech. He is talking in his sleep. He states that it appears he is taking shallow breathes when he is sleeping. No fever or chills. He has a cough but its not bad. No loss of bowel or bladder control. They have not taken him to the ER. They stopped the Oxycodone. His son believes that this medication is working and now he is over medicated. The son states that his wife may be concerned for pneumonia but he doesn't think so because it started before what she told us and has progressively gotten worse. Please advise on how to move forward.

## 2025-01-01 VITALS
HEIGHT: 70 IN | SYSTOLIC BLOOD PRESSURE: 155 MMHG | RESPIRATION RATE: 21 BRPM | BODY MASS INDEX: 20.04 KG/M2 | OXYGEN SATURATION: 94 % | WEIGHT: 140 LBS | TEMPERATURE: 98.3 F | HEART RATE: 106 BPM | DIASTOLIC BLOOD PRESSURE: 103 MMHG

## 2025-01-01 LAB
QT INTERVAL: 371 MS
QT INTERVAL: 383 MS
QTC INTERVAL: 501 MS
QTC INTERVAL: 514 MS

## 2025-01-01 NOTE — ED NOTES
Second call to [facility] for bed status.  Bed has been assigned and awaiting environmental services to clean room

## 2025-01-02 ENCOUNTER — INPATIENT HOSPITAL (OUTPATIENT)
Dept: URBAN - METROPOLITAN AREA HOSPITAL 107 | Facility: HOSPITAL | Age: 80
End: 2025-01-02
Payer: MEDICARE

## 2025-01-02 DIAGNOSIS — R62.7 ADULT FAILURE TO THRIVE: ICD-10-CM

## 2025-01-02 DIAGNOSIS — K31.84 GASTROPARESIS: ICD-10-CM

## 2025-01-02 DIAGNOSIS — R63.0 ANOREXIA: ICD-10-CM

## 2025-01-02 DIAGNOSIS — R63.4 ABNORMAL WEIGHT LOSS: ICD-10-CM

## 2025-01-02 DIAGNOSIS — Z86.0100 PERSONAL HISTORY OF COLON POLYPS, UNSPECIFIED: ICD-10-CM

## 2025-01-02 PROCEDURE — 99223 1ST HOSP IP/OBS HIGH 75: CPT | Mod: FS | Performed by: PHYSICIAN ASSISTANT

## 2025-01-02 NOTE — TELEPHONE ENCOUNTER
No changes have been made. He is currently [facility] where his [facility] team is. I provided his wife with the [employer] number so she can provide his doctor with if they want to have the pain pump decreased. She stated that they told her they want to decrease his pain pump but I'm pretty sure they will call us if they want to change anything.

## 2025-01-03 ENCOUNTER — INPATIENT HOSPITAL (OUTPATIENT)
Dept: URBAN - METROPOLITAN AREA HOSPITAL 107 | Facility: HOSPITAL | Age: 80
End: 2025-01-03
Payer: MEDICARE

## 2025-01-03 DIAGNOSIS — K31.84 GASTROPARESIS: ICD-10-CM

## 2025-01-03 DIAGNOSIS — R63.0 ANOREXIA: ICD-10-CM

## 2025-01-03 DIAGNOSIS — R63.4 ABNORMAL WEIGHT LOSS: ICD-10-CM

## 2025-01-03 DIAGNOSIS — R62.7 ADULT FAILURE TO THRIVE: ICD-10-CM

## 2025-01-03 PROCEDURE — 99233 SBSQ HOSP IP/OBS HIGH 50: CPT | Performed by: PHYSICIAN ASSISTANT

## 2025-01-05 ENCOUNTER — INPATIENT HOSPITAL (OUTPATIENT)
Dept: URBAN - METROPOLITAN AREA HOSPITAL 107 | Facility: HOSPITAL | Age: 80
End: 2025-01-05
Payer: MEDICARE

## 2025-01-05 DIAGNOSIS — R63.4 ABNORMAL WEIGHT LOSS: ICD-10-CM

## 2025-01-05 DIAGNOSIS — R62.7 ADULT FAILURE TO THRIVE: ICD-10-CM

## 2025-01-05 DIAGNOSIS — R63.0 ANOREXIA: ICD-10-CM

## 2025-01-05 PROCEDURE — 99233 SBSQ HOSP IP/OBS HIGH 50: CPT | Performed by: INTERNAL MEDICINE

## 2025-01-08 ENCOUNTER — TELEPHONE (OUTPATIENT)
Dept: PAIN MEDICINE | Facility: CLINIC | Age: 80
End: 2025-01-08
Payer: MEDICARE

## 2025-01-08 NOTE — TELEPHONE ENCOUNTER
I called Ms. Cotto to check on Mr. Cotto today and to see if [employer] ever decreased his pain pump. She informed me that [employer] did decrease his pain pump. Mr. Cotto is still at [facility] with a feeding tube. The plan is to discontinue to feeding tube and then discharge him to rehab. I called Allison with [employer] to see if she can get me the telemetry. She stated that's he would text whomever performed the decrease and email me the telemetry.

## 2025-02-11 DIAGNOSIS — Z86.73 HISTORY OF TIA (TRANSIENT ISCHEMIC ATTACK): ICD-10-CM

## 2025-02-11 RX ORDER — CLOPIDOGREL BISULFATE 75 MG/1
75 TABLET ORAL DAILY
Qty: 30 TABLET | Refills: 0 | Status: SHIPPED | OUTPATIENT
Start: 2025-02-11

## 2025-03-03 ENCOUNTER — TELEPHONE (OUTPATIENT)
Dept: PAIN MEDICINE | Facility: CLINIC | Age: 80
End: 2025-03-03

## 2025-03-03 NOTE — TELEPHONE ENCOUNTER
Caller: Vinh Cotto    Relationship: Emergency Contact    Best call back number:     What is the best time to reach you: ANYTIME-NOT AVAILABLE FROM 1PM-2PM    Who are you requesting to speak with (clinical staff, provider,  specific staff member): CLINICAL    Do you know the name of the person who called: REQUESTING ABDULKADIR Sanches    What was the call regarding: PATIENT DECLINE IN HIS HEALTH-PATIENT IS CURRENTLY IN NURSING LOCATION AND PAIN PUMP HAS BEEN CHANGED AND VINH STATED HE PROBABLY WOULD NOT MAKE NEXT SCHEDULED APPOINTMENT BUT WOULD LIKE TO SPEAK WITH CLINICAL TEAM BEFORE CHANGING. VINH ALSO STATED THAT HIS MOTHER EWELINA COTTO IS IN THE NURSING HOME AS WELL AND ITS BEST TO CONTACT HIM ONLY.    Is it okay if the provider responds through MyChart: CALL ONLY- VINH CALLED REQUESTING A CALL TO SPEAK WITH PROVIDER OR PA.

## 2025-03-03 NOTE — TELEPHONE ENCOUNTER
Patient has been declining since we last saw him. He has been in and out of the hospital, in rehab and currently he is at [facility] in [facility] [place].  They have seen a doctor that there who stated that Mr. Cotto has been in severe withdrawals since he was taken off the oral medications because of the decrease of the morphine. He was put on tramadol to help with the symptoms and weaned off. He is only on the pain pump at this time. He is no longer having increased pain.The son believes that Mr. Cotto may not to make it until when he has to refill date which 6/12/25. They may call in comfort care/ hospice but they aren't sure yet. He wanted to inform us.

## 2025-03-12 ENCOUNTER — TELEPHONE (OUTPATIENT)
Dept: GASTROENTEROLOGY | Facility: CLINIC | Age: 80
End: 2025-03-12
Payer: MEDICARE

## 2025-03-12 NOTE — TELEPHONE ENCOUNTER
Received refill request for Domperidone 10 mg from [facility].   LOV: 8/27/2024

## 2025-05-27 ENCOUNTER — TELEPHONE (OUTPATIENT)
Dept: PAIN MEDICINE | Facility: CLINIC | Age: 80
End: 2025-05-27
Payer: MEDICARE

## 2025-05-27 NOTE — TELEPHONE ENCOUNTER
Wife called in 5/27. PT Saurav passed away last Tuesday 5/20. She called in to cancel his appt with us for next Tuesday.

## (undated) DEVICE — DRSNG CURITY SURG ADHS 4X6IN

## (undated) DEVICE — 3M™ STERI-STRIP™ REINFORCED ADHESIVE SKIN CLOSURES, R1547, 1/2 IN X 4 IN (12 MM X 100 MM), 6 STRIPS/ENVELOPE: Brand: 3M™ STERI-STRIP™

## (undated) DEVICE — SOL IRR H2O BTL 1000ML STRL

## (undated) DEVICE — DRAPE,REIN 53X77,STERILE: Brand: MEDLINE

## (undated) DEVICE — DRAPE,CHEST,FENES,15X10,STERIL: Brand: MEDLINE

## (undated) DEVICE — SUT VIC 3/0 PS2 27IN J427H

## (undated) DEVICE — SYR LL W/SCALE/MARK 3ML STRL

## (undated) DEVICE — VIAL FORMALIN CAP 10P 40ML

## (undated) DEVICE — EPIDURAL TRAY: Brand: MEDLINE INDUSTRIES, INC.

## (undated) DEVICE — DRSNG SLVR/ANTIBAC PRIMASEAL POST/OP ADHS 3.5X6IN

## (undated) DEVICE — Device

## (undated) DEVICE — REMOTE CONTROL KIT: Brand: FREELINK™

## (undated) DEVICE — SUT VIC 2/0 CT2 27IN J269H

## (undated) DEVICE — STPLR SKIN VISISTAT WD 35CT

## (undated) DEVICE — DRSNG TELFA PAD NONADH STR 1S 3X4IN

## (undated) DEVICE — SUT VICYL 2/0 CR8 18IN DYED J726D

## (undated) DEVICE — IRRIGATOR BULB ASEPTO 60CC STRL

## (undated) DEVICE — 9165 UNIVERSAL PATIENT PLATE: Brand: 3M™

## (undated) DEVICE — 3M™ IOBAN™ 2 ANTIMICROBIAL INCISE DRAPE 6650EZ: Brand: IOBAN™ 2

## (undated) DEVICE — ADHS SKIN DERMABOND TOP ADVANCED

## (undated) DEVICE — 3M™ TEGADERM™ IV ADVANCED SECUREMENT DRESSING 4 INCHES X 4 3/4 INCHES 50 DRESSINGS/CARTON 4 CARTONS/CASE 1688: Brand: 3M™ TEGADERM™

## (undated) DEVICE — ADAPT CLN BIOGUARD AIR/H2O DISP

## (undated) DEVICE — CHARGING SYSTEM KIT: Brand: PRECISION™

## (undated) DEVICE — CANN NASL O2 INF

## (undated) DEVICE — TOWEL,OR,DSP,ST,BLUE,STD,4/PK,20PK/CS: Brand: MEDLINE

## (undated) DEVICE — HEX WRENCH, 7.6CM: Brand: CLIK™ ANCHOR

## (undated) DEVICE — ADHESIVE ISLAND DRESSING: Brand: TELFA

## (undated) DEVICE — JACKT LAB F/R KNIT CUFF/COLR XLG BLU

## (undated) DEVICE — SUT SILK 0/0 CT2 18IN C027D

## (undated) DEVICE — GLV SURG TRIUMPH PF LTX 7.5 STRL

## (undated) DEVICE — LINER SURG CANSTR SXN S/RIGD 1500CC

## (undated) DEVICE — KT ORCA ORCAPOD DISP STRL

## (undated) DEVICE — DRP C/ARM 41X74IN

## (undated) DEVICE — GOWN IMPERV OPN/BK KNT/CUF XXL

## (undated) DEVICE — SUT SILK 0 CT1 18IN 424H

## (undated) DEVICE — 1010 S-DRAPE TOWEL DRAPE 10/BX: Brand: STERI-DRAPE™

## (undated) DEVICE — SYR LL 3CC

## (undated) DEVICE — APPL DURAPREP IODOPHOR APL 26ML

## (undated) DEVICE — Device: Brand: PORTEX

## (undated) DEVICE — SPONGE,LAP,18"X18",XR,ST,5/TRAY: Brand: MEDLINE

## (undated) DEVICE — BW-412T DISP COMBO CLEANING BRUSH: Brand: SINGLE USE COMBINATION CLEANING BRUSH

## (undated) DEVICE — DEV SUT FIXATE BND TISS DUAL FB20101

## (undated) DEVICE — 35CM LONG TUNNELING TOOL

## (undated) DEVICE — SUT SILK 2/0 SH 30IN K833H

## (undated) DEVICE — SPNG LAP 18X18IN LF STRL PK/5

## (undated) DEVICE — CONMED GOLDLINE ELECTROSURGICAL HANDPIECE, HAND CONTROLLED WITH ULTRACLEAN BLADE ELECTRODE, ROCKER SWITCH, SAFETY HOLSTER AND 10' (3 M) CABLE: Brand: CONMED GOLDLINE

## (undated) DEVICE — SOL ANTISEP SCRB HIBICLENS CHG4PCT 4OZ

## (undated) DEVICE — NDL HYPO PRECISIONGLIDE REG 25G 1 1/2

## (undated) DEVICE — SYR LUERLOK 20CC BX/50

## (undated) DEVICE — GLV SURG SENSICARE PI MIC PF SZ8 LF STRL

## (undated) DEVICE — GAUZE,SPONGE,4"X4",16PLY,XRAY,STRL,LF: Brand: MEDLINE

## (undated) DEVICE — X-RAY DETECTABLE SPONGES,16 PLY: Brand: VISTEC

## (undated) DEVICE — APPL CHLORAPREP W/TINT 26ML ORNG

## (undated) DEVICE — LOU MINOR PROCEDURE: Brand: MEDLINE INDUSTRIES, INC.

## (undated) DEVICE — DRSNG SURESITE WNDW 4X4.5

## (undated) DEVICE — INTENDED FOR TISSUE SEPARATION, AND OTHER PROCEDURES THAT REQUIRE A SHARP SURGICAL BLADE TO PUNCTURE OR CUT.: Brand: BARD-PARKER ® CARBON RIB-BACK BLADES

## (undated) DEVICE — NDL SPINE 25G 31/2IN BLU

## (undated) DEVICE — PROGRAMMER PUMP EXT FOR SYNCHROMEDII TH90T01

## (undated) DEVICE — GLV XRAY PROTECT RADIONX LTX SZ8 KHAKI

## (undated) DEVICE — FRCP BX RADJAW4 NDL 2.8 240CM LG OG BX40